# Patient Record
Sex: FEMALE | Race: WHITE | NOT HISPANIC OR LATINO | Employment: OTHER | URBAN - METROPOLITAN AREA
[De-identification: names, ages, dates, MRNs, and addresses within clinical notes are randomized per-mention and may not be internally consistent; named-entity substitution may affect disease eponyms.]

---

## 2017-02-27 RX ORDER — ERGOCALCIFEROL 1.25 MG/1
CAPSULE ORAL
Qty: 13 CAPSULE | Refills: 3 | Status: SHIPPED | OUTPATIENT
Start: 2017-02-27 | End: 2018-02-19 | Stop reason: SDUPTHER

## 2017-11-01 ENCOUNTER — OFFICE VISIT (OUTPATIENT)
Dept: INTERNAL MEDICINE | Facility: CLINIC | Age: 62
End: 2017-11-01

## 2017-11-01 DIAGNOSIS — Z00.00 ANNUAL PHYSICAL EXAM: Primary | ICD-10-CM

## 2017-11-01 DIAGNOSIS — R73.02 IGT (IMPAIRED GLUCOSE TOLERANCE): ICD-10-CM

## 2017-11-01 LAB
ALBUMIN SERPL-MCNC: 4.5 G/DL (ref 3.5–5.2)
ALBUMIN/GLOB SERPL: 1.6 G/DL
ALP SERPL-CCNC: 100 U/L (ref 39–117)
ALT SERPL-CCNC: 41 U/L (ref 1–33)
AST SERPL-CCNC: 32 U/L (ref 1–32)
BASOPHILS # BLD AUTO: 0.03 10*3/MM3 (ref 0–0.2)
BASOPHILS NFR BLD AUTO: 0.4 % (ref 0–1.5)
BILIRUB SERPL-MCNC: 0.4 MG/DL (ref 0.1–1.2)
BUN SERPL-MCNC: 16 MG/DL (ref 8–23)
BUN/CREAT SERPL: 13.7 (ref 7–25)
CALCIUM SERPL-MCNC: 9.3 MG/DL (ref 8.6–10.5)
CHLORIDE SERPL-SCNC: 102 MMOL/L (ref 98–107)
CHOLEST SERPL-MCNC: 229 MG/DL (ref 0–200)
CO2 SERPL-SCNC: 25.7 MMOL/L (ref 22–29)
CREAT SERPL-MCNC: 1.17 MG/DL (ref 0.57–1)
EOSINOPHIL # BLD AUTO: 0.16 10*3/MM3 (ref 0–0.7)
EOSINOPHIL NFR BLD AUTO: 2.4 % (ref 0.3–6.2)
ERYTHROCYTE [DISTWIDTH] IN BLOOD BY AUTOMATED COUNT: 15.6 % (ref 11.7–13)
GFR SERPLBLD CREATININE-BSD FMLA CKD-EPI: 47 ML/MIN/1.73
GFR SERPLBLD CREATININE-BSD FMLA CKD-EPI: 57 ML/MIN/1.73
GLOBULIN SER CALC-MCNC: 2.9 GM/DL
GLUCOSE SERPL-MCNC: 99 MG/DL (ref 65–99)
HBA1C MFR BLD: 5.8 % (ref 4.8–5.6)
HCT VFR BLD AUTO: 44.8 % (ref 35.6–45.5)
HDLC SERPL-MCNC: 52 MG/DL (ref 40–60)
HGB BLD-MCNC: 13.9 G/DL (ref 11.9–15.5)
IMM GRANULOCYTES # BLD: 0.03 10*3/MM3 (ref 0–0.03)
IMM GRANULOCYTES NFR BLD: 0.4 % (ref 0–0.5)
LDLC SERPL CALC-MCNC: 152 MG/DL (ref 0–100)
LDLC/HDLC SERPL: 2.93 {RATIO}
LYMPHOCYTES # BLD AUTO: 2.71 10*3/MM3 (ref 0.9–4.8)
LYMPHOCYTES NFR BLD AUTO: 40 % (ref 19.6–45.3)
MCH RBC QN AUTO: 28.2 PG (ref 26.9–32)
MCHC RBC AUTO-ENTMCNC: 31 G/DL (ref 32.4–36.3)
MCV RBC AUTO: 90.9 FL (ref 80.5–98.2)
MONOCYTES # BLD AUTO: 0.46 10*3/MM3 (ref 0.2–1.2)
MONOCYTES NFR BLD AUTO: 6.8 % (ref 5–12)
NEUTROPHILS # BLD AUTO: 3.39 10*3/MM3 (ref 1.9–8.1)
NEUTROPHILS NFR BLD AUTO: 50 % (ref 42.7–76)
PLATELET # BLD AUTO: 199 10*3/MM3 (ref 140–500)
POTASSIUM SERPL-SCNC: 4.5 MMOL/L (ref 3.5–5.2)
PROT SERPL-MCNC: 7.4 G/DL (ref 6–8.5)
RBC # BLD AUTO: 4.93 10*6/MM3 (ref 3.9–5.2)
SODIUM SERPL-SCNC: 143 MMOL/L (ref 136–145)
TRIGL SERPL-MCNC: 123 MG/DL (ref 0–150)
VLDLC SERPL CALC-MCNC: 24.6 MG/DL (ref 5–40)
WBC # BLD AUTO: 6.78 10*3/MM3 (ref 4.5–10.7)

## 2017-11-07 ENCOUNTER — OFFICE VISIT (OUTPATIENT)
Dept: INTERNAL MEDICINE | Facility: CLINIC | Age: 62
End: 2017-11-07

## 2017-11-07 VITALS
WEIGHT: 186 LBS | BODY MASS INDEX: 30.99 KG/M2 | SYSTOLIC BLOOD PRESSURE: 136 MMHG | TEMPERATURE: 98 F | HEIGHT: 65 IN | HEART RATE: 93 BPM | DIASTOLIC BLOOD PRESSURE: 72 MMHG | OXYGEN SATURATION: 95 % | RESPIRATION RATE: 16 BRPM

## 2017-11-07 DIAGNOSIS — Z12.39 SCREENING FOR BREAST CANCER: ICD-10-CM

## 2017-11-07 DIAGNOSIS — E55.9 VITAMIN D DEFICIENCY: ICD-10-CM

## 2017-11-07 DIAGNOSIS — Z00.00 ENCOUNTER FOR ANNUAL HEALTH EXAMINATION: Primary | ICD-10-CM

## 2017-11-07 DIAGNOSIS — R93.7 ABNORMALITY ON BONE DENSITOMETRY: ICD-10-CM

## 2017-11-07 DIAGNOSIS — R73.02 IGT (IMPAIRED GLUCOSE TOLERANCE): ICD-10-CM

## 2017-11-07 DIAGNOSIS — I35.8 AORTIC SYSTOLIC MURMUR ON EXAMINATION: ICD-10-CM

## 2017-11-07 LAB
BILIRUB BLD-MCNC: NEGATIVE MG/DL
CLARITY, POC: CLEAR
COLOR UR: YELLOW
GLUCOSE UR STRIP-MCNC: NEGATIVE MG/DL
KETONES UR QL: NEGATIVE
LEUKOCYTE EST, POC: NEGATIVE
NITRITE UR-MCNC: NEGATIVE MG/ML
PH UR: 6.5 [PH] (ref 5–8)
PROT UR STRIP-MCNC: NEGATIVE MG/DL
RBC # UR STRIP: NEGATIVE /UL
SP GR UR: 1.01 (ref 1–1.03)
UROBILINOGEN UR QL: NORMAL

## 2017-11-07 PROCEDURE — 99396 PREV VISIT EST AGE 40-64: CPT | Performed by: INTERNAL MEDICINE

## 2017-11-07 PROCEDURE — 93000 ELECTROCARDIOGRAM COMPLETE: CPT | Performed by: INTERNAL MEDICINE

## 2017-11-07 PROCEDURE — 81003 URINALYSIS AUTO W/O SCOPE: CPT | Performed by: INTERNAL MEDICINE

## 2017-11-07 NOTE — PROGRESS NOTES
Subjective   Chastity Berrios is a 61 y.o. female.     Chief Complaint   Patient presents with   • Annual Exam         HPI Comments: In for annual preventative exam.  She gets 7-8 hours of sleep at night.  Sleeps well.  Exercises 3-4 days per week.  Diet is balanced.  Energy is fairly good.       The following portions of the patient's history were reviewed and updated as appropriate: allergies, current medications, past social history and problem list.    HISTORY  Outpatient Prescriptions Marked as Taking for the 11/7/17 encounter (Office Visit) with Alvaro Small MD   Medication Sig Dispense Refill   • vitamin D (ERGOCALCIFEROL) 72871 UNITS capsule capsule TAKE ONE CAPSULE BY MOUTH ONCE WEEKLY 13 capsule 3     Social History     Social History   • Marital status:      Spouse name: N/A   • Number of children: N/A   • Years of education: N/A     Occupational History   • Not on file.     Social History Main Topics   • Smoking status: Never Smoker   • Smokeless tobacco: Never Used   • Alcohol use Yes      Comment: Very Occas: Wine every 2 or 3 months   • Drug use: Not on file   • Sexual activity: Not on file     Other Topics Concern   • Not on file     Social History Narrative     No family history on file.  No past medical history on file.  No past surgical history on file.    Review of Systems   Constitutional: Negative for appetite change, chills, fatigue and fever.   HENT: Negative for congestion, ear pain, hearing loss, nosebleeds, postnasal drip, rhinorrhea, sinus pressure and trouble swallowing.    Eyes: Negative for pain, itching and visual disturbance.   Respiratory: Positive for wheezing. Negative for cough, chest tightness and shortness of breath.    Cardiovascular: Positive for chest pain. Negative for palpitations and leg swelling.   Gastrointestinal: Negative for abdominal pain, anal bleeding, constipation, diarrhea, nausea, rectal pain and vomiting.   Endocrine: Negative for cold intolerance, heat  intolerance and polyuria.   Genitourinary: Negative for difficulty urinating, dysuria, flank pain, frequency, hematuria and urgency.   Musculoskeletal: Positive for arthralgias (both thumbs are getting stiff). Negative for back pain and myalgias.   Skin: Negative for rash.   Allergic/Immunologic: Positive for environmental allergies.   Neurological: Negative for dizziness, syncope, speech difficulty, weakness, light-headedness, numbness and headaches.   Hematological: Does not bruise/bleed easily.   Psychiatric/Behavioral: Negative for agitation, confusion and sleep disturbance. The patient is not nervous/anxious.        Objective   Vitals:    11/07/17 1501   BP: 136/72   Pulse: 93   Resp: 16   Temp: 98 °F (36.7 °C)   SpO2: 95%      Last Weight    11/07/17  1501   Weight: 186 lb (84.4 kg)    [unfilled]  Body mass index is 30.95 kg/(m^2).      Physical Exam   Constitutional: She is oriented to person, place, and time. She appears well-developed and well-nourished.   HENT:   Head: Normocephalic and atraumatic.   Right Ear: External ear normal.   Left Ear: External ear normal.   Nose: Nose normal.   Mouth/Throat: Oropharynx is clear and moist.   Eyes: Conjunctivae and EOM are normal. Pupils are equal, round, and reactive to light.   Neck: Normal range of motion. Neck supple. No JVD present. No thyromegaly present.   Cardiovascular: Normal rate, regular rhythm and intact distal pulses.  Exam reveals no gallop.    Murmur heard.   Crescendo decrescendo systolic murmur is present with a grade of 2/6   Grade 2-3 FLOR aortic area that radiates slightly into the left carotid artery   Pulmonary/Chest: Effort normal and breath sounds normal. No respiratory distress. She has no wheezes. She has no rales.   Abdominal: Soft. Bowel sounds are normal. She exhibits no distension and no mass. There is no tenderness. There is no guarding. No hernia.   Musculoskeletal: Normal range of motion. She exhibits no edema.   Lymphadenopathy:      She has no cervical adenopathy.   Neurological: She is alert and oriented to person, place, and time. She displays normal reflexes. No cranial nerve deficit. Coordination normal.   Skin: Skin is warm and dry.   Psychiatric: She has a normal mood and affect. Her behavior is normal. Judgment and thought content normal.   Nursing note and vitals reviewed.        Problem List Items Addressed This Visit     None      Visit Diagnoses     Encounter for annual health examination    -  Primary    Relevant Orders    POCT urinalysis dipstick, automated (Completed)        Assessment/Plan   In for annual preventative exam.  She has a history of migraines that have resolved years.  She has a history of PAT that resolved after an ablation in February 2015.  She has mild AI.  IGT.  Osteopenia.  Paget's disease of the bone.  She's had this anterior chest pain with walking uphill he eats into the neck and jaw area and is associated with shortness of breath and occasionally diaphoresis.  She has to stop and rest.  Her  has to stop and wait.  She did have cardiac catheterization prior to the ablation which was normal.  The symptoms have improved in the past year.  They also were present prior to her cardiac catheterization which was normal.   It's also of interest that she has a grade 2-3 FLOR at the aortic area.  Notes suggest trivial AI in the past.  It could be this is a new murmur.  She tells me later that the exercise symptoms began prior to the ablation and that's what led to the cardiac catheterization 2 years ago.  CBC, CMP, lipids, A1c, vit D level and EKG today.  Schedule a DEXA scan.    I reviewed her old records and found mention of a similar cardiac murmur 2012.  The rest of the cardiac evaluations showed no evidence of murmur.  Hard to know if this was an oversight.  Dr. Gillespie's examination 2 years ago also was reportedly normal.  No murmur heard.  Her cardiac catheterization was completely normal.  An echo  was performed but I could not find the report.  Some aortic sclerosis only.  A treadmill was never performed since her symptoms were so good for angina.  Will repeat a TTE.      ECG 12 Lead  Date/Time: 11/7/2017 4:54 PM  Performed by: JASPREET RAMIREZ  Authorized by: JASPREET RAMIREZ   Comparison: not compared with previous ECG   Previous ECG: no previous ECG available  Rhythm: sinus rhythm  Rate: normal  Conduction: conduction normal  ST Segments: ST segments normal  T Waves: T waves normal  QRS axis: normal  Other: no other findings  Clinical impression: normal ECG                       Dragon disclaimer:   Much of this encounter note is an electronic transcription/translation of spoken language to printed text. The electronic translation of spoken language may permit erroneous, or at times, nonsensical words or phrases to be inadvertently transcribed; Although I have reviewed the note for such errors, some may still exist.

## 2017-11-14 ENCOUNTER — TELEPHONE (OUTPATIENT)
Dept: INTERNAL MEDICINE | Facility: CLINIC | Age: 62
End: 2017-11-14

## 2017-11-27 ENCOUNTER — HOSPITAL ENCOUNTER (OUTPATIENT)
Dept: MAMMOGRAPHY | Facility: HOSPITAL | Age: 62
Discharge: HOME | End: 2017-11-27
Attending: INTERNAL MEDICINE | Admitting: INTERNAL MEDICINE

## 2017-11-27 DIAGNOSIS — Z12.39 SCREENING FOR BREAST CANCER: ICD-10-CM

## 2017-11-27 PROCEDURE — G0202 SCR MAMMO BI INCL CAD: HCPCS

## 2017-11-28 ENCOUNTER — HOSPITAL ENCOUNTER (OUTPATIENT)
Dept: CARDIOLOGY | Facility: HOSPITAL | Age: 62
Setting detail: HOSPITAL OUTPATIENT SURGERY
Discharge: HOME | End: 2017-11-28
Attending: INTERNAL MEDICINE | Admitting: INTERNAL MEDICINE

## 2017-11-28 ENCOUNTER — HOSPITAL ENCOUNTER (OUTPATIENT)
Dept: BONE DENSITY | Facility: HOSPITAL | Age: 62
Discharge: HOME | End: 2017-11-28
Attending: INTERNAL MEDICINE

## 2017-11-28 VITALS
WEIGHT: 186 LBS | SYSTOLIC BLOOD PRESSURE: 147 MMHG | DIASTOLIC BLOOD PRESSURE: 67 MMHG | HEIGHT: 65 IN | BODY MASS INDEX: 30.99 KG/M2 | HEART RATE: 80 BPM

## 2017-11-28 DIAGNOSIS — I35.0 NONRHEUMATIC AORTIC VALVE STENOSIS: Primary | ICD-10-CM

## 2017-11-28 DIAGNOSIS — R93.7 ABNORMALITY ON BONE DENSITOMETRY: ICD-10-CM

## 2017-11-28 DIAGNOSIS — I35.8 AORTIC SYSTOLIC MURMUR ON EXAMINATION: ICD-10-CM

## 2017-11-28 LAB
AORTIC DIMENSIONLESS INDEX: 0.2 (DI)
BH CV ECHO MEAS - ACS: 1.5 CM
BH CV ECHO MEAS - AI DEC SLOPE: 224 CM/SEC^2
BH CV ECHO MEAS - AI MAX PG: 38.7 MMHG
BH CV ECHO MEAS - AI MAX VEL: 311 CM/SEC
BH CV ECHO MEAS - AI P1/2T: 406.7 MSEC
BH CV ECHO MEAS - AO MAX PG (FULL): 45.8 MMHG
BH CV ECHO MEAS - AO MAX PG: 56 MMHG
BH CV ECHO MEAS - AO MEAN PG (FULL): 25 MMHG
BH CV ECHO MEAS - AO MEAN PG: 32 MMHG
BH CV ECHO MEAS - AO ROOT AREA (BSA CORRECTED): 1.8
BH CV ECHO MEAS - AO ROOT AREA: 9.1 CM^2
BH CV ECHO MEAS - AO ROOT DIAM: 3.4 CM
BH CV ECHO MEAS - AO V2 MAX: 373 CM/SEC
BH CV ECHO MEAS - AO V2 MEAN: 242 CM/SEC
BH CV ECHO MEAS - AO V2 VTI: 91 CM
BH CV ECHO MEAS - ASC AORTA: 3.4 CM
BH CV ECHO MEAS - AVA(I,A): 0.63 CM^2
BH CV ECHO MEAS - AVA(I,D): 0.5 CM^2
BH CV ECHO MEAS - AVA(V,A): 0.6 CM^2
BH CV ECHO MEAS - AVA(V,D): 0.6 CM^2
BH CV ECHO MEAS - BSA(HAYCOCK): 2 M^2
BH CV ECHO MEAS - BSA: 1.9 M^2
BH CV ECHO MEAS - BZI_BMI: 31 KILOGRAMS/M^2
BH CV ECHO MEAS - BZI_METRIC_HEIGHT: 165.1 CM
BH CV ECHO MEAS - BZI_METRIC_WEIGHT: 84.4 KG
BH CV ECHO MEAS - CONTRAST EF (2CH): 71.2 ML/M^2
BH CV ECHO MEAS - CONTRAST EF 4CH: 68.8 ML/M^2
BH CV ECHO MEAS - EDV(CUBED): 68.9 ML
BH CV ECHO MEAS - EDV(MOD-SP2): 66 ML
BH CV ECHO MEAS - EDV(MOD-SP4): 77 ML
BH CV ECHO MEAS - EDV(TEICH): 74.2 ML
BH CV ECHO MEAS - EF(CUBED): 68.1 %
BH CV ECHO MEAS - EF(MOD-SP2): 71.2 %
BH CV ECHO MEAS - EF(MOD-SP4): 68.8 %
BH CV ECHO MEAS - EF(TEICH): 60.2 %
BH CV ECHO MEAS - ESV(CUBED): 22 ML
BH CV ECHO MEAS - ESV(MOD-SP2): 19 ML
BH CV ECHO MEAS - ESV(MOD-SP4): 24 ML
BH CV ECHO MEAS - ESV(TEICH): 29.6 ML
BH CV ECHO MEAS - FS: 31.7 %
BH CV ECHO MEAS - IVS/LVPW: 1.1
BH CV ECHO MEAS - IVSD: 1 CM
BH CV ECHO MEAS - LAT PEAK E' VEL: 8 CM/SEC
BH CV ECHO MEAS - LV DIASTOLIC VOL/BSA (35-75): 40.1 ML/M^2
BH CV ECHO MEAS - LV MASS(C)D: 123 GRAMS
BH CV ECHO MEAS - LV MASS(C)DI: 64.1 GRAMS/M^2
BH CV ECHO MEAS - LV MAX PG: 2.7 MMHG
BH CV ECHO MEAS - LV MEAN PG: 2 MMHG
BH CV ECHO MEAS - LV SYSTOLIC VOL/BSA (12-30): 12.5 ML/M^2
BH CV ECHO MEAS - LV V1 MAX: 82 CM/SEC
BH CV ECHO MEAS - LV V1 MEAN: 58.4 CM/SEC
BH CV ECHO MEAS - LV V1 VTI: 21.5 CM
BH CV ECHO MEAS - LVIDD: 4.1 CM
BH CV ECHO MEAS - LVIDS: 2.8 CM
BH CV ECHO MEAS - LVLD AP2: 7.8 CM
BH CV ECHO MEAS - LVLD AP4: 7.7 CM
BH CV ECHO MEAS - LVLS AP2: 5.9 CM
BH CV ECHO MEAS - LVLS AP4: 6.2 CM
BH CV ECHO MEAS - LVOT AREA (M): 2.5 CM^2
BH CV ECHO MEAS - LVOT AREA: 2.5 CM^2
BH CV ECHO MEAS - LVOT DIAM: 1.8 CM
BH CV ECHO MEAS - LVPWD: 0.9 CM
BH CV ECHO MEAS - MED PEAK E' VEL: 7 CM/SEC
BH CV ECHO MEAS - MR MAX PG: 80.3 MMHG
BH CV ECHO MEAS - MR MAX VEL: 448 CM/SEC
BH CV ECHO MEAS - MV A DUR: 0.18 SEC
BH CV ECHO MEAS - MV A MAX VEL: 84.9 CM/SEC
BH CV ECHO MEAS - MV DEC SLOPE: 596 CM/SEC^2
BH CV ECHO MEAS - MV DEC TIME: 0.22 SEC
BH CV ECHO MEAS - MV E MAX VEL: 87.3 CM/SEC
BH CV ECHO MEAS - MV E/A: 1
BH CV ECHO MEAS - MV MAX PG: 4 MMHG
BH CV ECHO MEAS - MV MEAN PG: 2 MMHG
BH CV ECHO MEAS - MV P1/2T MAX VEL: 99.1 CM/SEC
BH CV ECHO MEAS - MV P1/2T: 48.7 MSEC
BH CV ECHO MEAS - MV V2 MAX: 99.7 CM/SEC
BH CV ECHO MEAS - MV V2 MEAN: 56.2 CM/SEC
BH CV ECHO MEAS - MV V2 VTI: 24 CM
BH CV ECHO MEAS - MVA P1/2T LCG: 2.2 CM^2
BH CV ECHO MEAS - MVA(P1/2T): 4.5 CM^2
BH CV ECHO MEAS - MVA(VTI): 2.3 CM^2
BH CV ECHO MEAS - PA ACC TIME: 0.12 SEC
BH CV ECHO MEAS - PA MAX PG (FULL): 2.3 MMHG
BH CV ECHO MEAS - PA MAX PG: 3.9 MMHG
BH CV ECHO MEAS - PA PR(ACCEL): 23.7 MMHG
BH CV ECHO MEAS - PA V2 MAX: 99.3 CM/SEC
BH CV ECHO MEAS - PULM A REVS DUR: 0.15 SEC
BH CV ECHO MEAS - PULM A REVS VEL: 39.3 CM/SEC
BH CV ECHO MEAS - PULM DIAS VEL: 51.4 CM/SEC
BH CV ECHO MEAS - PULM S/D: 1.3
BH CV ECHO MEAS - PULM SYS VEL: 66.5 CM/SEC
BH CV ECHO MEAS - PVA(V,A): 2.7 CM^2
BH CV ECHO MEAS - PVA(V,D): 2.7 CM^2
BH CV ECHO MEAS - QP/QS: 0.95
BH CV ECHO MEAS - RAP SYSTOLE: 3 MMHG
BH CV ECHO MEAS - RV MAX PG: 1.6 MMHG
BH CV ECHO MEAS - RV MEAN PG: 1 MMHG
BH CV ECHO MEAS - RV V1 MAX: 63.5 CM/SEC
BH CV ECHO MEAS - RV V1 MEAN: 41.7 CM/SEC
BH CV ECHO MEAS - RV V1 VTI: 12.5 CM
BH CV ECHO MEAS - RVOT AREA: 4.2 CM^2
BH CV ECHO MEAS - RVOT DIAM: 2.3 CM
BH CV ECHO MEAS - RVSP: 22.5 MMHG
BH CV ECHO MEAS - SI(AO): 410.4 ML/M^2
BH CV ECHO MEAS - SI(CUBED): 24.5 ML/M^2
BH CV ECHO MEAS - SI(LVOT): 28.5 ML/M^2
BH CV ECHO MEAS - SI(MOD-SP2): 24.5 ML/M^2
BH CV ECHO MEAS - SI(MOD-SP4): 27.6 ML/M^2
BH CV ECHO MEAS - SI(TEICH): 23.3 ML/M^2
BH CV ECHO MEAS - SV(AO): 787.2 ML
BH CV ECHO MEAS - SV(CUBED): 47 ML
BH CV ECHO MEAS - SV(LVOT): 54.7 ML
BH CV ECHO MEAS - SV(MOD-SP2): 47 ML
BH CV ECHO MEAS - SV(MOD-SP4): 53 ML
BH CV ECHO MEAS - SV(RVOT): 51.9 ML
BH CV ECHO MEAS - SV(TEICH): 44.7 ML
BH CV ECHO MEAS - TAPSE (>1.6): 2 CM2
BH CV ECHO MEAS - TR MAX VEL: 221 CM/SEC
BH CV VAS BP RIGHT ARM: NORMAL MMHG
BH CV XLRA - RV BASE: 2.8 CM
BH CV XLRA - TDI S': 14 CM/SEC
E/E' RATIO: 12
LEFT ATRIUM VOLUME INDEX: 22 ML/M2
LEFT ATRIUM VOLUME: 38 CM3
MAXIMAL PREDICTED HEART RATE: 156 BPM
STRESS TARGET HR: 133 BPM

## 2017-11-28 PROCEDURE — 93306 TTE W/DOPPLER COMPLETE: CPT

## 2017-11-28 PROCEDURE — 93306 TTE W/DOPPLER COMPLETE: CPT | Performed by: INTERNAL MEDICINE

## 2017-11-28 PROCEDURE — 77080 DXA BONE DENSITY AXIAL: CPT

## 2017-11-28 PROCEDURE — 0399T HC MYOCARDL STRAIN IMAG QUAN ASSMT PER SESS: CPT

## 2017-11-28 PROCEDURE — 0399T ADULT TRANSTHORACIC ECHO COMPLETE W/ CONT IF NECESSARY PER PROTOCOL: CPT | Performed by: INTERNAL MEDICINE

## 2017-12-04 ENCOUNTER — TELEPHONE (OUTPATIENT)
Dept: CARDIOLOGY | Facility: CLINIC | Age: 62
End: 2017-12-04

## 2018-01-09 ENCOUNTER — OFFICE VISIT (OUTPATIENT)
Dept: CARDIOLOGY | Facility: CLINIC | Age: 63
End: 2018-01-09

## 2018-01-09 VITALS
HEIGHT: 65 IN | DIASTOLIC BLOOD PRESSURE: 88 MMHG | HEART RATE: 79 BPM | WEIGHT: 185 LBS | BODY MASS INDEX: 30.82 KG/M2 | SYSTOLIC BLOOD PRESSURE: 150 MMHG

## 2018-01-09 DIAGNOSIS — I35.0 NONRHEUMATIC AORTIC VALVE STENOSIS: Primary | ICD-10-CM

## 2018-01-09 PROCEDURE — 93000 ELECTROCARDIOGRAM COMPLETE: CPT | Performed by: INTERNAL MEDICINE

## 2018-01-09 PROCEDURE — 99204 OFFICE O/P NEW MOD 45 MIN: CPT | Performed by: INTERNAL MEDICINE

## 2018-01-09 RX ORDER — CALCIPOTRIENE AND BETAMETHASONE DIPROPIONATE 50; .5 UG/G; MG/G
SUSPENSION TOPICAL DAILY
COMMUNITY
End: 2020-11-13

## 2018-01-12 NOTE — PROGRESS NOTES
Subjective:     Encounter Date:01/09/2018      Patient ID: Chastity Berrios is a 62 y.o. female.    Chief Complaint:Abnormal echocardiogram.  History of Present Illness    62-year-old female who was referred by Dr. Alvaro SCHNEIDER to my office for evaluation of an abnormal transthoracic echocardiogram.  Patient presented complaining of chest discomfort with exertion as well as some shortness of breath with exertion.  Patient has a history of an AVNRT and was admitted by Dr. Colindres several years ago.  She also had a cardiac catheterization at that time that showed no significant coronary artery disease.  Patient has continued to deteriorate.  Her stamina has worsened she has started to significantly slow down.  She also complains of a dry cough.  Echocardiogram was done that showed aortic stenosis and she was referred for further evaluation  Review of Systems   Respiratory: Positive for shortness of breath and snoring.    Musculoskeletal: Positive for joint pain.   All other systems reviewed and are negative.        ECG 12 Lead  Date/Time: 1/9/2018 2:48 PM  Performed by: KADE HARO  Authorized by: KADE HARO   Comparison: compared with previous ECG from 2/14/2014  Similar to previous ECG  Rhythm: sinus rhythm  Clinical impression: normal ECG               Objective:     Physical Exam   Constitutional: She is oriented to person, place, and time. She appears well-developed.   HENT:   Head: Normocephalic.   Eyes: Conjunctivae are normal.   Neck: Normal range of motion.   Cardiovascular: Normal rate, regular rhythm and normal heart sounds.    Pulmonary/Chest: Breath sounds normal.   Abdominal: Soft. Bowel sounds are normal.   Musculoskeletal: Normal range of motion. She exhibits no edema.   Neurological: She is alert and oriented to person, place, and time.   Skin: Skin is warm and dry.   Psychiatric: She has a normal mood and affect. Her behavior is normal.   Vitals reviewed.      Lab Review:        Assessment:          Diagnosis Plan   1. Nonrheumatic aortic valve stenosis  Adult Transesophageal Echo (EMANI) W/ Cont if Necessary Per Protocol          Plan:       1. Patient clearly is significantly more symptomatic over the past several months.  She said it really started 2015 continue to worsen and accelerated recently.  I reviewed the echocardiogram with both her and her .  There was a caveat there that could not exclude aseptic aortic obstruction.  Looking at the valve that was pretty significant movement considering her symptoms.  In light of that I think the first appropriate approach is a transesophageal echocardiogram to make sure that this is either the valve or or possibly something subvalvular.

## 2018-01-15 ENCOUNTER — LAB (OUTPATIENT)
Dept: LAB | Facility: HOSPITAL | Age: 63
End: 2018-01-15
Attending: INTERNAL MEDICINE

## 2018-01-15 ENCOUNTER — TRANSCRIBE ORDERS (OUTPATIENT)
Dept: ADMINISTRATIVE | Facility: HOSPITAL | Age: 63
End: 2018-01-15

## 2018-01-15 DIAGNOSIS — Z13.6 SCREENING FOR ISCHEMIC HEART DISEASE: ICD-10-CM

## 2018-01-15 DIAGNOSIS — Z01.810 PRE-OPERATIVE CARDIOVASCULAR EXAMINATION: Primary | ICD-10-CM

## 2018-01-15 DIAGNOSIS — Z01.810 PRE-OPERATIVE CARDIOVASCULAR EXAMINATION: ICD-10-CM

## 2018-01-15 LAB
ANION GAP SERPL CALCULATED.3IONS-SCNC: 12.1 MMOL/L
BASOPHILS # BLD AUTO: 0.04 10*3/MM3 (ref 0–0.2)
BASOPHILS NFR BLD AUTO: 0.6 % (ref 0–1.5)
BUN BLD-MCNC: 16 MG/DL (ref 8–23)
BUN/CREAT SERPL: 12.9 (ref 7–25)
CALCIUM SPEC-SCNC: 9.8 MG/DL (ref 8.6–10.5)
CHLORIDE SERPL-SCNC: 102 MMOL/L (ref 98–107)
CO2 SERPL-SCNC: 29.9 MMOL/L (ref 22–29)
CREAT BLD-MCNC: 1.24 MG/DL (ref 0.57–1)
DEPRECATED RDW RBC AUTO: 45.9 FL (ref 37–54)
EOSINOPHIL # BLD AUTO: 0.22 10*3/MM3 (ref 0–0.7)
EOSINOPHIL NFR BLD AUTO: 3.1 % (ref 0.3–6.2)
ERYTHROCYTE [DISTWIDTH] IN BLOOD BY AUTOMATED COUNT: 14.3 % (ref 11.7–13)
GFR SERPL CREATININE-BSD FRML MDRD: 44 ML/MIN/1.73
GLUCOSE BLD-MCNC: 125 MG/DL (ref 65–99)
HCT VFR BLD AUTO: 42.3 % (ref 35.6–45.5)
HGB BLD-MCNC: 13.9 G/DL (ref 11.9–15.5)
IMM GRANULOCYTES # BLD: 0.02 10*3/MM3 (ref 0–0.03)
IMM GRANULOCYTES NFR BLD: 0.3 % (ref 0–0.5)
LYMPHOCYTES # BLD AUTO: 2.91 10*3/MM3 (ref 0.9–4.8)
LYMPHOCYTES NFR BLD AUTO: 40.9 % (ref 19.6–45.3)
MCH RBC QN AUTO: 29 PG (ref 26.9–32)
MCHC RBC AUTO-ENTMCNC: 32.9 G/DL (ref 32.4–36.3)
MCV RBC AUTO: 88.1 FL (ref 80.5–98.2)
MONOCYTES # BLD AUTO: 0.43 10*3/MM3 (ref 0.2–1.2)
MONOCYTES NFR BLD AUTO: 6 % (ref 5–12)
NEUTROPHILS # BLD AUTO: 3.49 10*3/MM3 (ref 1.9–8.1)
NEUTROPHILS NFR BLD AUTO: 49.1 % (ref 42.7–76)
PLATELET # BLD AUTO: 183 10*3/MM3 (ref 140–500)
PMV BLD AUTO: 10.8 FL (ref 6–12)
POTASSIUM BLD-SCNC: 4.3 MMOL/L (ref 3.5–5.2)
RBC # BLD AUTO: 4.8 10*6/MM3 (ref 3.9–5.2)
SODIUM BLD-SCNC: 144 MMOL/L (ref 136–145)
WBC NRBC COR # BLD: 7.11 10*3/MM3 (ref 4.5–10.7)

## 2018-01-15 PROCEDURE — 80048 BASIC METABOLIC PNL TOTAL CA: CPT

## 2018-01-15 PROCEDURE — 36415 COLL VENOUS BLD VENIPUNCTURE: CPT

## 2018-01-15 PROCEDURE — 85025 COMPLETE CBC W/AUTO DIFF WBC: CPT

## 2018-01-18 ENCOUNTER — HOSPITAL ENCOUNTER (OUTPATIENT)
Dept: CARDIOLOGY | Facility: HOSPITAL | Age: 63
Discharge: HOME | End: 2018-01-18

## 2018-01-18 ENCOUNTER — HOSPITAL ENCOUNTER (OUTPATIENT)
Dept: CARDIOLOGY | Facility: HOSPITAL | Age: 63
Discharge: HOME | End: 2018-01-18
Attending: INTERNAL MEDICINE | Admitting: INTERNAL MEDICINE

## 2018-01-18 VITALS — DIASTOLIC BLOOD PRESSURE: 70 MMHG | SYSTOLIC BLOOD PRESSURE: 130 MMHG | OXYGEN SATURATION: 95 % | HEART RATE: 64 BPM

## 2018-01-18 VITALS
RESPIRATION RATE: 18 BRPM | HEART RATE: 65 BPM | OXYGEN SATURATION: 95 % | DIASTOLIC BLOOD PRESSURE: 66 MMHG | SYSTOLIC BLOOD PRESSURE: 123 MMHG

## 2018-01-18 DIAGNOSIS — I35.0 NONRHEUMATIC AORTIC VALVE STENOSIS: ICD-10-CM

## 2018-01-18 DIAGNOSIS — I35.0 NONRHEUMATIC AORTIC VALVE STENOSIS: Primary | ICD-10-CM

## 2018-01-18 LAB
BH CV ECHO MEAS - BSA(HAYCOCK): 2 M^2
BH CV ECHO MEAS - BSA: 1.9 M^2
BH CV ECHO MEAS - BZI_BMI: 30 KILOGRAMS/M^2
BH CV ECHO MEAS - BZI_METRIC_HEIGHT: 165.1 CM
BH CV ECHO MEAS - BZI_METRIC_WEIGHT: 81.6 KG

## 2018-01-18 PROCEDURE — 99152 MOD SED SAME PHYS/QHP 5/>YRS: CPT | Performed by: INTERNAL MEDICINE

## 2018-01-18 PROCEDURE — 25010000002 MIDAZOLAM PER 1 MG: Performed by: INTERNAL MEDICINE

## 2018-01-18 PROCEDURE — 93312 ECHO TRANSESOPHAGEAL: CPT

## 2018-01-18 PROCEDURE — 25010000002 FENTANYL CITRATE (PF) 100 MCG/2ML SOLUTION: Performed by: INTERNAL MEDICINE

## 2018-01-18 PROCEDURE — 93325 DOPPLER ECHO COLOR FLOW MAPG: CPT

## 2018-01-18 PROCEDURE — 93325 DOPPLER ECHO COLOR FLOW MAPG: CPT | Performed by: INTERNAL MEDICINE

## 2018-01-18 PROCEDURE — 94760 N-INVAS EAR/PLS OXIMETRY 1: CPT

## 2018-01-18 PROCEDURE — 93321 DOPPLER ECHO F-UP/LMTD STD: CPT | Performed by: INTERNAL MEDICINE

## 2018-01-18 PROCEDURE — 93312 ECHO TRANSESOPHAGEAL: CPT | Performed by: INTERNAL MEDICINE

## 2018-01-18 PROCEDURE — 93321 DOPPLER ECHO F-UP/LMTD STD: CPT

## 2018-01-18 PROCEDURE — 93320 DOPPLER ECHO COMPLETE: CPT

## 2018-01-18 RX ORDER — FENTANYL CITRATE 50 UG/ML
INJECTION, SOLUTION INTRAMUSCULAR; INTRAVENOUS
Status: COMPLETED | OUTPATIENT
Start: 2018-01-18 | End: 2018-01-18

## 2018-01-18 RX ORDER — MIDAZOLAM HYDROCHLORIDE 1 MG/ML
INJECTION INTRAMUSCULAR; INTRAVENOUS
Status: COMPLETED | OUTPATIENT
Start: 2018-01-18 | End: 2018-01-18

## 2018-01-18 RX ADMIN — Medication 1 MG: at 08:18

## 2018-01-18 RX ADMIN — Medication 2 MG: at 08:14

## 2018-01-18 RX ADMIN — FENTANYL CITRATE 50 MCG: 50 INJECTION INTRAMUSCULAR; INTRAVENOUS at 08:14

## 2018-01-18 NOTE — PATIENT INSTRUCTIONS
Transesophageal Echocardiogram  Transesophageal echocardiography (EMANI) is a picture test of your heart using sound waves. The pictures taken can give very detailed pictures of your heart. This can help your doctor see if there are problems with your heart. EMANI can check:  · If your heart has blood clots in it.  · How well your heart valves are working.  · If you have an infection on the inside of your heart.  · Some of the major arteries of your heart.  · If your heart valve is working after a repair.  · Your heart before a procedure that uses a shock to your heart to get the rhythm back to normal.  What happens before the procedure?  · Do not eat or drink for 6 hours before the procedure or as told by your doctor.  · Make plans to have someone drive you home after the procedure. Do not drive yourself home.  · An IV tube will be put in your arm.  What happens during the procedure?  · You will be given a medicine to help you relax (sedative). It will be given through the IV tube.  · A numbing medicine will be sprayed or gargled in the back of your throat to help numb it.  · The tip of the probe is placed into the back of your mouth. You will be asked to swallow. This helps to pass the probe into your esophagus.  · Once the tip of the probe is in the right place, your doctor can take pictures of your heart.  · You may feel pressure at the back of your throat.  What happens after the procedure?  · You will be taken to a recovery area so the sedative can wear off.  · Your throat may be sore and scratchy. This will go away slowly over time.  · You will go home when you are fully awake and able to swallow liquids.  · You should have someone stay with you for the next 24 hours.  · Do not drive or operate machinery for the next 24 hours.  This information is not intended to replace advice given to you by your health care provider. Make sure you discuss any questions you have with your health care provider.  Document  Released: 10/15/2010 Document Revised: 05/25/2017 Document Reviewed: 06/19/2014  [employer] Interactive Patient Education © 2017 [employer]    Moderate Conscious Sedation, Adult, Care After  These instructions provide you with information about caring for yourself after your procedure. Your health care provider may also give you more specific instructions. Your treatment has been planned according to current medical practices, but problems sometimes occur. Call your health care provider if you have any problems or questions after your procedure.  What can I expect after the procedure?  After your procedure, it is common:  · To feel sleepy for several hours.  · To feel clumsy and have poor balance for several hours.  · To have poor judgment for several hours.  · To vomit if you eat too soon.  Follow these instructions at home:  For at least 24 hours after the procedure:   · Do not:  ¨ Participate in activities where you could fall or become injured.  ¨ Drive.  ¨ Use heavy machinery.  ¨ Drink alcohol.  ¨ Take sleeping pills or medicines that cause drowsiness.  ¨ Make important decisions or sign legal documents.  ¨ Take care of children on your own.  · Rest.  Eating and drinking  · Follow the diet recommended by your health care provider.  · If you vomit:  ¨ Drink water, juice, or soup when you can drink without vomiting.  ¨ Make sure you have little or no nausea before eating solid foods.  General instructions  · Have a responsible adult stay with you until you are awake and alert.  · Take over-the-counter and prescription medicines only as told by your health care provider.  · If you smoke, do not smoke without supervision.  · Keep all follow-up visits as told by your health care provider. This is important.  Contact a health care provider if:  · You keep feeling nauseous or you keep vomiting.  · You feel light-headed.  · You develop a rash.  · You have a fever.  Get help right away if:  · You have trouble  breathing.  This information is not intended to replace advice given to you by your health care provider. Make sure you discuss any questions you have with your health care provider.  Document Released: 10/08/2014 Document Revised: 05/22/2017 Document Reviewed: 04/08/2017  [employer] Interactive Patient Education © 2017 [employer]

## 2018-01-19 PROBLEM — I35.0 NONRHEUMATIC AORTIC VALVE STENOSIS: Status: ACTIVE | Noted: 2018-01-19

## 2018-01-24 ENCOUNTER — HOSPITAL ENCOUNTER (OUTPATIENT)
Facility: HOSPITAL | Age: 63
Setting detail: HOSPITAL OUTPATIENT SURGERY
Discharge: HOME | End: 2018-01-24
Attending: INTERNAL MEDICINE | Admitting: INTERNAL MEDICINE

## 2018-01-24 VITALS
HEIGHT: 65 IN | RESPIRATION RATE: 16 BRPM | SYSTOLIC BLOOD PRESSURE: 130 MMHG | OXYGEN SATURATION: 94 % | WEIGHT: 179.31 LBS | DIASTOLIC BLOOD PRESSURE: 70 MMHG | TEMPERATURE: 98.7 F | HEART RATE: 70 BPM | BODY MASS INDEX: 29.87 KG/M2

## 2018-01-24 DIAGNOSIS — I35.0 NONRHEUMATIC AORTIC VALVE STENOSIS: ICD-10-CM

## 2018-01-24 PROCEDURE — 25010000002 MIDAZOLAM PER 1 MG: Performed by: INTERNAL MEDICINE

## 2018-01-24 PROCEDURE — C1894 INTRO/SHEATH, NON-LASER: HCPCS | Performed by: INTERNAL MEDICINE

## 2018-01-24 PROCEDURE — 0 IOPAMIDOL PER 1 ML: Performed by: INTERNAL MEDICINE

## 2018-01-24 PROCEDURE — 85018 HEMOGLOBIN: CPT

## 2018-01-24 PROCEDURE — 85014 HEMATOCRIT: CPT

## 2018-01-24 PROCEDURE — 25010000002 FENTANYL CITRATE (PF) 100 MCG/2ML SOLUTION: Performed by: INTERNAL MEDICINE

## 2018-01-24 PROCEDURE — C1887 CATHETER, GUIDING: HCPCS | Performed by: INTERNAL MEDICINE

## 2018-01-24 PROCEDURE — C1769 GUIDE WIRE: HCPCS | Performed by: INTERNAL MEDICINE

## 2018-01-24 PROCEDURE — 93460 R&L HRT ART/VENTRICLE ANGIO: CPT | Performed by: INTERNAL MEDICINE

## 2018-01-24 PROCEDURE — 25010000002 HEPARIN (PORCINE) PER 1000 UNITS: Performed by: INTERNAL MEDICINE

## 2018-01-24 RX ORDER — NALOXONE HCL 0.4 MG/ML
0.4 VIAL (ML) INJECTION
Status: CANCELLED | OUTPATIENT
Start: 2018-01-24

## 2018-01-24 RX ORDER — LIDOCAINE HYDROCHLORIDE 10 MG/ML
0.1 INJECTION, SOLUTION EPIDURAL; INFILTRATION; INTRACAUDAL; PERINEURAL ONCE AS NEEDED
Status: DISCONTINUED | OUTPATIENT
Start: 2018-01-24 | End: 2018-01-24 | Stop reason: HOSPADM

## 2018-01-24 RX ORDER — ONDANSETRON 2 MG/ML
4 INJECTION INTRAMUSCULAR; INTRAVENOUS EVERY 6 HOURS PRN
Status: CANCELLED | OUTPATIENT
Start: 2018-01-24

## 2018-01-24 RX ORDER — FLUTICASONE PROPIONATE 50 MCG
2 SPRAY, SUSPENSION (ML) NASAL DAILY PRN
COMMUNITY

## 2018-01-24 RX ORDER — MIDAZOLAM HYDROCHLORIDE 1 MG/ML
INJECTION INTRAMUSCULAR; INTRAVENOUS AS NEEDED
Status: DISCONTINUED | OUTPATIENT
Start: 2018-01-24 | End: 2018-01-24 | Stop reason: HOSPADM

## 2018-01-24 RX ORDER — LIDOCAINE HYDROCHLORIDE 20 MG/ML
INJECTION, SOLUTION INFILTRATION; PERINEURAL AS NEEDED
Status: DISCONTINUED | OUTPATIENT
Start: 2018-01-24 | End: 2018-01-24 | Stop reason: HOSPADM

## 2018-01-24 RX ORDER — FENTANYL CITRATE 50 UG/ML
INJECTION, SOLUTION INTRAMUSCULAR; INTRAVENOUS AS NEEDED
Status: DISCONTINUED | OUTPATIENT
Start: 2018-01-24 | End: 2018-01-24 | Stop reason: HOSPADM

## 2018-01-24 RX ORDER — PERPHENAZINE/AMITRIPTYLINE HCL 4MG-10MG
1000 TABLET ORAL DAILY
COMMUNITY
End: 2018-03-03 | Stop reason: HOSPADM

## 2018-01-24 RX ORDER — SODIUM CHLORIDE 0.9 % (FLUSH) 0.9 %
1-10 SYRINGE (ML) INJECTION AS NEEDED
Status: DISCONTINUED | OUTPATIENT
Start: 2018-01-24 | End: 2018-01-24 | Stop reason: HOSPADM

## 2018-01-24 RX ORDER — ONDANSETRON 4 MG/1
4 TABLET, ORALLY DISINTEGRATING ORAL EVERY 6 HOURS PRN
Status: CANCELLED | OUTPATIENT
Start: 2018-01-24

## 2018-01-24 RX ORDER — SODIUM CHLORIDE 9 MG/ML
75 INJECTION, SOLUTION INTRAVENOUS CONTINUOUS
Status: DISCONTINUED | OUTPATIENT
Start: 2018-01-24 | End: 2018-01-24 | Stop reason: HOSPADM

## 2018-01-24 RX ORDER — ONDANSETRON 4 MG/1
4 TABLET, FILM COATED ORAL EVERY 6 HOURS PRN
Status: CANCELLED | OUTPATIENT
Start: 2018-01-24

## 2018-01-24 RX ORDER — HYDROCODONE BITARTRATE AND ACETAMINOPHEN 5; 325 MG/1; MG/1
1 TABLET ORAL EVERY 4 HOURS PRN
Status: CANCELLED | OUTPATIENT
Start: 2018-01-24

## 2018-01-24 RX ADMIN — SODIUM CHLORIDE 75 ML/HR: 9 INJECTION, SOLUTION INTRAVENOUS at 08:21

## 2018-01-24 NOTE — PLAN OF CARE
Problem: Patient Care Overview (Adult)  Goal: Plan of Care Review  Outcome: Outcome(s) achieved Date Met: 01/24/18 01/24/18 1116   Coping/Psychosocial Response Interventions   Plan Of Care Reviewed With patient;spouse   Patient Care Overview   Progress improving   Outcome Evaluation   Outcome Summary/Follow up Plan site C/D/I and soft. Ready for D/C     Goal: Adult Individualization and Mutuality  Outcome: Outcome(s) achieved Date Met: 01/24/18    Goal: Discharge Needs Assessment  Outcome: Outcome(s) achieved Date Met: 01/24/18      Problem: Cardiac Catheterization with/without PCI (Adult)  Goal: Signs and Symptoms of Listed Potential Problems Will be Absent or Manageable (Cardiac Catheterization with/without PCI)  Outcome: Outcome(s) achieved Date Met: 01/24/18

## 2018-01-24 NOTE — DISCHARGE INSTRUCTIONS
[facility]  [address] [place] [address]    Coronary Angiogram (Radial/Ulnar Approach) After Care    Refer to this sheet in the next few weeks. These instructions provide you with information on caring for yourself after your procedure. Your caregiver may also give you more specific instructions. Your treatment has been planned according to current medical practices, but problems sometimes occur. Call your caregiver if you have any problems or questions after your procedure.    Home Care Instructions:  · You may shower the day after the procedure. Remove the bandage (dressing) and gently wash the site with plain soap and water. Gently pat the site dry. You may apply a band aid daily for 2 days if desired.    · Do not apply powder or lotion to the site.  · Do not submerge the affected site in water for one week or until the site is completely healed.   · Do not lift, push or pull anything over 10 pounds for one week after your procedure.  · Inspect the site at least twice daily. You may notice some bruising at the site and it may be tender for 1 to 2 weeks.     · Increase your fluid intake for the next 2 days.    · Keep arm elevated for 24 hours. For the remainder of the day, keep your arm in “Pledge of Allegiance” position when up and about.     · You may drive 24 hours after the procedure unless otherwise instructed by your caregiver.  · Do not operate machinery or power tools for 24 hours.  · A responsible adult should be with you for the first 24 hours after you arrive home. Do not make any important legal decisions or sign legal papers for 24 hours.      Call Your Doctor if:   · You have unusual pain at the radial/ulnar (wrist) site.  · You have redness, warmth, swelling, or pain at the radial/ulnar (wrist) site.  · You have drainage (other than a small amount of blood on the dressing).  · You have chills or a fever > 101.  · Your arm becomes pale or dark, cool, tingly, or numb.  · You  have heavy bleeding from the site, hold pressure on the site for 20 minutes.  If the bleeding stops, apply a fresh bandage and call your cardiologist.  However, if you continue to have bleeding, call 911.          ***SEDATION DISCHARGE INSTRUCTIONS.***    IMPORTANT: The following information will help you return to your best level of health.  Sedation.  You have had a procedure that called for some medicine to reduce anxiety and pain. This medicine (or medicines) is called  sedation. After receiving the medicine, you may be sleepy, but able to breathe on your own. The effects of the medicine may last for several hours.    Follow these instructions after sedation:   Go right home. Rest quietly at home today, then you can be up and about.   Do not drink alcohol, drive or operate machinery for 24 hours.   Do not do anything where light-headedness or clumsiness would be dangerous.   Do not make important decisions or sign any legal papers for the next 24 hours.   Make sure A RESPONSIBLE PERSON stays with you the rest of today and overnight for your protection and safety.   Start your diet with fluids and light foods (jello, soup, juice, toast). Then, slowly progress to your usual diet if you are not sick to your stomach.    Call your doctor if you have:   a gray or blue skin color.   excess sleepiness.   repeated vomiting.   trouble breathing.   any new problems or concerns.

## 2018-01-25 LAB
HCT VFR BLDA CALC: 37 % (ref 38–51)
HGB BLDA-MCNC: 12.6 G/DL (ref 12–17)
SAO2 % BLDA: 65 % (ref 95–98)
SAO2 % BLDA: 70 % (ref 95–98)
SAO2 % BLDA: 95 % (ref 95–98)

## 2018-02-01 ENCOUNTER — OFFICE VISIT (OUTPATIENT)
Dept: CARDIAC SURGERY | Facility: CLINIC | Age: 63
End: 2018-02-01

## 2018-02-01 VITALS
OXYGEN SATURATION: 98 % | WEIGHT: 180 LBS | RESPIRATION RATE: 20 BRPM | SYSTOLIC BLOOD PRESSURE: 147 MMHG | HEART RATE: 87 BPM | BODY MASS INDEX: 29.99 KG/M2 | HEIGHT: 65 IN | TEMPERATURE: 98.4 F | DIASTOLIC BLOOD PRESSURE: 82 MMHG

## 2018-02-01 DIAGNOSIS — I35.0 NONRHEUMATIC AORTIC VALVE STENOSIS: Primary | ICD-10-CM

## 2018-02-01 PROCEDURE — 99205 OFFICE O/P NEW HI 60 MIN: CPT | Performed by: THORACIC SURGERY (CARDIOTHORACIC VASCULAR SURGERY)

## 2018-02-01 NOTE — PROGRESS NOTES
2/1/2018      Subjective:      Alvaro Small MD    Chief Complaint: Shortness of air and cough.  Severe aortic stenosis.    History of Present Illness:       Dear Dr. Alvaro Small MD and Colleagues,  It was nice to see Chastity Berrios in consultation at your request. She is a 62 y.o. female with a history of aortic stenosis who has developed class III symptoms of shortness of air chest discomfort and presyncope and a chronic cough since the fall. She denies congestive heart failure. The ECHO that I reviewed personally shows critical aortic stenosis with a 0.5 cm valve area.  The mean gradient is 32 mmHg.  The peak gradient is 56 mmHg.  The velocity is 373 cm/s.  There were some concern about (TriCor alpha tract obstruction so a transesophageal echo was performed.  This shows no left ventricular outflow tract obstruction.. The Cardiac Cath that I reviewed personally shows normal coronary arteries.  The gradient is 33 mmHg mean.  The PA pressures 24/13.  The valve areas 0.6 and a meter squared.    She is therefore referred for aortic valve surgery.  She had an ablation by Dr. Colindres in the past and is been in regular rhythm since then.    Patient Active Problem List   Diagnosis   • Psoriasis   • Paget's bone disease   • Perennial allergic rhinitis   • PAT (paroxysmal atrial tachycardia)   • Migraine without aura and without status migrainosus, not intractable   • IGT (impaired glucose tolerance)   • Osteopenia   • Vitamin D deficiency   • Nonrheumatic aortic valve stenosis       Past Medical History:   Diagnosis Date   • Angina pectoris    • Aortic regurgitation     trace to mild   • Aortic systolic murmur on examination    • Arrhythmia    • Arthralgia     both thumbs are getting stiff   • Chest pain    • Coronary artery disease    • Environmental allergies    • Heart palpitations    • Hyperlipidemia    • IGT (impaired glucose tolerance)    • Migraines    • Mild concentric left ventricular hypertrophy (LVH)    •  Mitral regurgitation     trivial to mild   • PSVT (paroxysmal supraventricular tachycardia)    • Pulmonic regurgitation     trace   • SVT (supraventricular tachycardia)    • Wheezing        Past Surgical History:   Procedure Laterality Date   • CARDIAC ABLATION  02/14/2014   • CARDIAC CATHETERIZATION  01/06/2014   • CARDIAC CATHETERIZATION N/A 1/24/2018    Procedure: Right and Left Heart Cath;  Surgeon: Fabián Morales MD;  Location: [facility] CATH INVASIVE LOCATION;  Service:    • CARDIAC CATHETERIZATION N/A 1/24/2018    Procedure: Coronary angiography;  Surgeon: Fabián Morales MD;  Location: [facility] CATH INVASIVE LOCATION;  Service:    • CARDIAC CATHETERIZATION N/A 1/24/2018    Procedure: Left ventriculography;  Surgeon: Fabián Morales MD;  Location: [facility] CATH INVASIVE LOCATION;  Service:    • CATARACT EXTRACTION     • TONSILLECTOMY         No Known Allergies      Current Outpatient Prescriptions:   •  calcipotriene-betamethasone (TACLONEX) 0.005-0.064 % external suspension, Apply  topically Daily., Disp: , Rfl:   •  Coconut Oil (EQL COCONUT OIL) 1000 MG capsule, Take 1,000 mg by mouth., Disp: , Rfl:   •  fluticasone (FLONASE) 50 MCG/ACT nasal spray, 2 sprays into each nostril Daily., Disp: , Rfl:   •  Multiple Vitamins-Minerals (MULTIVITAMIN ADULTS PO), Take  by mouth., Disp: , Rfl:   •  vitamin D (ERGOCALCIFEROL) 69382 UNITS capsule capsule, TAKE ONE CAPSULE BY MOUTH ONCE WEEKLY, Disp: 13 capsule, Rfl: 3    Social History     Social History   • Marital status:      Spouse name: N/A   • Number of children: N/A   • Years of education: N/A     Occupational History   • Not on file.     Social History Main Topics   • Smoking status: Never Smoker   • Smokeless tobacco: Never Used   • Alcohol use 0.6 oz/week     1 Glasses of wine per week      Comment: social / caffeine use   • Drug use: No   • Sexual activity: Defer     Other Topics Concern   • Not on file     Social History Narrative        Family History   Problem Relation Age of Onset   • Hyperlipidemia Mother    • Stroke Mother    • Hypertension Father    • Breast cancer Maternal Grandmother    • Breast cancer Paternal Uncle    • Diabetes Maternal Grandfather            Review of Systems:  Review of Systems   Constitutional: Positive for activity change and fatigue.   HENT: Positive for congestion.    Eyes: Negative.    Respiratory: Positive for cough, chest tightness and shortness of breath.    Cardiovascular: Positive for chest pain.   Gastrointestinal: Negative.    Endocrine: Negative.    Genitourinary: Negative.    Musculoskeletal: Negative.    Skin: Negative.    Allergic/Immunologic: Negative.    Neurological: Positive for dizziness.   Hematological: Negative.    Psychiatric/Behavioral: Negative.      Cardiovascular ROS: positive for - dyspnea on exertion, paroxysmal nocturnal dyspnea, shortness of breath and dizziness  Physical Exam:    Vital Signs:  Weight: 81.6 kg (180 lb)   Body mass index is 29.95 kg/(m^2).  Temp: 98.4 °F (36.9 °C)   Heart Rate: 87   BP: 147/82     Physical Exam   Constitutional: She is oriented to person, place, and time. She appears well-developed and well-nourished. No distress.   HENT:   Head: Normocephalic and atraumatic.   Eyes: Conjunctivae and EOM are normal. Pupils are equal, round, and reactive to light. Right eye exhibits no discharge. Left eye exhibits no discharge. No scleral icterus.   Neck: Normal range of motion. Neck supple. No JVD present. No tracheal deviation present. No thyromegaly present.   Cardiovascular: Normal rate and regular rhythm.  PMI is not displaced.  Exam reveals no gallop and no friction rub.    Murmur heard.   Harsh midsystolic murmur is present with a grade of 3/6  at the upper right sternal border radiating to the neck  Pulses:       Carotid pulses are 1+ on the right side with bruit, and 1+ on the left side with bruit.       Radial pulses are 2+ on the right side, and 2+ on the  left side.        Femoral pulses are 2+ on the right side, and 2+ on the left side.       Popliteal pulses are 2+ on the right side, and 2+ on the left side.        Dorsalis pedis pulses are 2+ on the right side, and 2+ on the left side.        Posterior tibial pulses are 2+ on the right side, and 2+ on the left side.   Pulmonary/Chest: Effort normal and breath sounds normal. No stridor. No respiratory distress. She has no wheezes. She has no rales. She exhibits no tenderness.   Abdominal: Soft. Bowel sounds are normal. She exhibits no distension and no mass. There is no tenderness. There is no rebound and no guarding.   Musculoskeletal: Normal range of motion. She exhibits no edema, tenderness or deformity.   Lymphadenopathy:     She has no cervical adenopathy.   Neurological: She is alert and oriented to person, place, and time. She has normal reflexes. She displays normal reflexes. No cranial nerve deficit. She exhibits normal muscle tone. Coordination normal.   Skin: Skin is warm and dry. No rash noted. She is not diaphoretic. No erythema. There is pallor.   Psychiatric: She has a normal mood and affect. Her behavior is normal. Judgment and thought content normal.        Assessment:     She has severe symptomatic aortic stenosis with class III symptoms.  The cough is a little bit unusual and is been present since the fall.  There is no recent upper respiratory infections.            Recommendation/Plan:       We'll plan aortic valve replacement next Wednesday with a tissue valve via mini sternotomy.  We went over all the possibilities were valve replacement and she would prefer a tissue valve which I think is the right thing to do.  All questions were answered and we'll plan for surgery next week.        Thank you for allowing me to participate in her care.    Regards,    Jean Claude Jones MD

## 2018-02-02 ENCOUNTER — PREP FOR SURGERY (OUTPATIENT)
Dept: OTHER | Facility: HOSPITAL | Age: 63
End: 2018-02-02

## 2018-02-02 ENCOUNTER — HOSPITAL ENCOUNTER (OUTPATIENT)
Facility: HOSPITAL | Age: 63
Setting detail: SURGERY ADMIT
End: 2018-02-02
Attending: THORACIC SURGERY (CARDIOTHORACIC VASCULAR SURGERY) | Admitting: THORACIC SURGERY (CARDIOTHORACIC VASCULAR SURGERY)

## 2018-02-02 DIAGNOSIS — I35.0 AORTIC STENOSIS: Primary | ICD-10-CM

## 2018-02-02 RX ORDER — CHLORHEXIDINE GLUCONATE 0.12 MG/ML
15 RINSE ORAL EVERY 12 HOURS
Status: CANCELLED | OUTPATIENT
Start: 2018-02-02 | End: 2018-02-03

## 2018-02-02 RX ORDER — CHLORHEXIDINE GLUCONATE 500 MG/1
1 CLOTH TOPICAL EVERY 12 HOURS PRN
Status: CANCELLED | OUTPATIENT
Start: 2018-02-02

## 2018-02-02 RX ORDER — CHLORHEXIDINE GLUCONATE 0.12 MG/ML
15 RINSE ORAL ONCE
Status: CANCELLED | OUTPATIENT
Start: 2018-02-02 | End: 2018-02-02

## 2018-02-02 RX ORDER — CEFAZOLIN SODIUM 2 G/100ML
2 INJECTION, SOLUTION INTRAVENOUS ONCE
Status: CANCELLED | OUTPATIENT
Start: 2018-02-02 | End: 2018-02-02

## 2018-02-06 ENCOUNTER — APPOINTMENT (OUTPATIENT)
Dept: PREADMISSION TESTING | Facility: HOSPITAL | Age: 63
End: 2018-02-06

## 2018-02-06 ENCOUNTER — APPOINTMENT (OUTPATIENT)
Dept: CARDIOLOGY | Facility: HOSPITAL | Age: 63
End: 2018-02-06

## 2018-02-07 ENCOUNTER — RESULTS ENCOUNTER (OUTPATIENT)
Dept: OTHER | Facility: HOSPITAL | Age: 63
End: 2018-02-07

## 2018-02-07 DIAGNOSIS — I35.0 AORTIC STENOSIS: ICD-10-CM

## 2018-02-14 ENCOUNTER — APPOINTMENT (OUTPATIENT)
Dept: CARDIOLOGY | Facility: HOSPITAL | Age: 63
End: 2018-02-14

## 2018-02-14 ENCOUNTER — APPOINTMENT (OUTPATIENT)
Dept: PREADMISSION TESTING | Facility: HOSPITAL | Age: 63
End: 2018-02-14

## 2018-02-19 RX ORDER — ERGOCALCIFEROL 1.25 MG/1
CAPSULE ORAL
Qty: 12 CAPSULE | Refills: 2 | Status: SHIPPED | OUTPATIENT
Start: 2018-02-19 | End: 2018-02-26

## 2018-02-20 ENCOUNTER — PREP FOR SURGERY (OUTPATIENT)
Dept: OTHER | Facility: HOSPITAL | Age: 63
End: 2018-02-20

## 2018-02-20 DIAGNOSIS — I35.0 AORTIC VALVE STENOSIS, ETIOLOGY OF CARDIAC VALVE DISEASE UNSPECIFIED: Primary | ICD-10-CM

## 2018-02-20 RX ORDER — CHLORHEXIDINE GLUCONATE 0.12 MG/ML
15 RINSE ORAL ONCE
Status: CANCELLED | OUTPATIENT
Start: 2018-02-27 | End: 2018-02-27

## 2018-02-20 RX ORDER — CEFAZOLIN SODIUM 2 G/100ML
2 INJECTION, SOLUTION INTRAVENOUS ONCE
Status: CANCELLED | OUTPATIENT
Start: 2018-02-27 | End: 2018-02-27

## 2018-02-20 RX ORDER — CHLORHEXIDINE GLUCONATE 500 MG/1
1 CLOTH TOPICAL EVERY 12 HOURS PRN
Status: CANCELLED | OUTPATIENT
Start: 2018-02-27

## 2018-02-20 RX ORDER — CHLORHEXIDINE GLUCONATE 500 MG/1
1 CLOTH TOPICAL EVERY 12 HOURS PRN
Status: CANCELLED | OUTPATIENT
Start: 2018-02-20

## 2018-02-20 RX ORDER — CHLORHEXIDINE GLUCONATE 0.12 MG/ML
15 RINSE ORAL EVERY 12 HOURS
Status: CANCELLED | OUTPATIENT
Start: 2018-02-20 | End: 2018-02-21

## 2018-02-21 ENCOUNTER — TELEPHONE (OUTPATIENT)
Dept: CARDIAC SURGERY | Facility: CLINIC | Age: 63
End: 2018-02-21

## 2018-02-25 ENCOUNTER — RESULTS ENCOUNTER (OUTPATIENT)
Dept: OTHER | Facility: HOSPITAL | Age: 63
End: 2018-02-25

## 2018-02-25 DIAGNOSIS — I35.0 AORTIC VALVE STENOSIS, ETIOLOGY OF CARDIAC VALVE DISEASE UNSPECIFIED: ICD-10-CM

## 2018-02-26 ENCOUNTER — ANESTHESIA EVENT (OUTPATIENT)
Dept: PERIOP | Facility: HOSPITAL | Age: 63
End: 2018-02-26

## 2018-02-26 ENCOUNTER — HOSPITAL ENCOUNTER (OUTPATIENT)
Dept: CARDIOLOGY | Facility: HOSPITAL | Age: 63
Discharge: HOME | End: 2018-02-26
Admitting: NURSE PRACTITIONER

## 2018-02-26 ENCOUNTER — HOSPITAL ENCOUNTER (OUTPATIENT)
Dept: GENERAL RADIOLOGY | Facility: HOSPITAL | Age: 63
Discharge: HOME | End: 2018-02-26

## 2018-02-26 ENCOUNTER — APPOINTMENT (OUTPATIENT)
Dept: PREADMISSION TESTING | Facility: HOSPITAL | Age: 63
End: 2018-02-26

## 2018-02-26 VITALS
RESPIRATION RATE: 18 BRPM | SYSTOLIC BLOOD PRESSURE: 147 MMHG | WEIGHT: 182 LBS | TEMPERATURE: 97 F | HEIGHT: 65 IN | BODY MASS INDEX: 30.32 KG/M2 | HEART RATE: 90 BPM | DIASTOLIC BLOOD PRESSURE: 82 MMHG | OXYGEN SATURATION: 99 %

## 2018-02-26 DIAGNOSIS — I35.0 AORTIC VALVE STENOSIS, ETIOLOGY OF CARDIAC VALVE DISEASE UNSPECIFIED: ICD-10-CM

## 2018-02-26 LAB
ABO GROUP BLD: NORMAL
ALBUMIN SERPL-MCNC: 3.9 G/DL (ref 3.5–5.2)
ALBUMIN/GLOB SERPL: 1.1 G/DL
ALP SERPL-CCNC: 88 U/L (ref 39–117)
ALT SERPL W P-5'-P-CCNC: 35 U/L (ref 1–33)
ANION GAP SERPL CALCULATED.3IONS-SCNC: 9.5 MMOL/L
APTT PPP: 25.8 SECONDS (ref 22.7–35.4)
ARTERIAL PATENCY WRIST A: POSITIVE
AST SERPL-CCNC: 24 U/L (ref 1–32)
ATMOSPHERIC PRESS: 759 MMHG
BASE EXCESS BLDA CALC-SCNC: 2 MMOL/L (ref 0–2)
BASOPHILS # BLD AUTO: 0.03 10*3/MM3 (ref 0–0.2)
BASOPHILS NFR BLD AUTO: 0.5 % (ref 0–1.5)
BDY SITE: NORMAL
BH CV XLRA MEAS LEFT DIST CCA EDV: -19.9 CM/SEC
BH CV XLRA MEAS LEFT DIST CCA PSV: -65.1 CM/SEC
BH CV XLRA MEAS LEFT DIST ICA EDV: -28.3 CM/SEC
BH CV XLRA MEAS LEFT DIST ICA PSV: -69.1 CM/SEC
BH CV XLRA MEAS LEFT MID ICA EDV: -30.6 CM/SEC
BH CV XLRA MEAS LEFT MID ICA PSV: -65.6 CM/SEC
BH CV XLRA MEAS LEFT PROX CCA EDV: 17.6 CM/SEC
BH CV XLRA MEAS LEFT PROX CCA PSV: 59.8 CM/SEC
BH CV XLRA MEAS LEFT PROX ECA EDV: -14.1 CM/SEC
BH CV XLRA MEAS LEFT PROX ECA PSV: -59.3 CM/SEC
BH CV XLRA MEAS LEFT PROX ICA EDV: -25.9 CM/SEC
BH CV XLRA MEAS LEFT PROX ICA PSV: -57.4 CM/SEC
BH CV XLRA MEAS LEFT PROX SCLA PSV: 90.9 CM/SEC
BH CV XLRA MEAS LEFT VERTEBRAL A EDV: 13.4 CM/SEC
BH CV XLRA MEAS LEFT VERTEBRAL A PSV: 48.7 CM/SEC
BH CV XLRA MEAS RIGHT DIST CCA EDV: -19.9 CM/SEC
BH CV XLRA MEAS RIGHT DIST CCA PSV: -69.2 CM/SEC
BH CV XLRA MEAS RIGHT DIST ICA EDV: -25.2 CM/SEC
BH CV XLRA MEAS RIGHT DIST ICA PSV: -62.1 CM/SEC
BH CV XLRA MEAS RIGHT MID ICA EDV: -26.4 CM/SEC
BH CV XLRA MEAS RIGHT MID ICA PSV: -72.1 CM/SEC
BH CV XLRA MEAS RIGHT PROX CCA EDV: 14.1 CM/SEC
BH CV XLRA MEAS RIGHT PROX CCA PSV: 63.9 CM/SEC
BH CV XLRA MEAS RIGHT PROX ECA EDV: -14.1 CM/SEC
BH CV XLRA MEAS RIGHT PROX ECA PSV: -69.2 CM/SEC
BH CV XLRA MEAS RIGHT PROX ICA EDV: -21.1 CM/SEC
BH CV XLRA MEAS RIGHT PROX ICA PSV: -70.9 CM/SEC
BH CV XLRA MEAS RIGHT PROX SCLA PSV: 82.1 CM/SEC
BH CV XLRA MEAS RIGHT VERTEBRAL A EDV: 18.2 CM/SEC
BH CV XLRA MEAS RIGHT VERTEBRAL A PSV: 53.4 CM/SEC
BILIRUB SERPL-MCNC: 0.5 MG/DL (ref 0.1–1.2)
BILIRUB UR QL STRIP: NEGATIVE
BLD GP AB SCN SERPL QL: NEGATIVE
BUN BLD-MCNC: 16 MG/DL (ref 8–23)
BUN/CREAT SERPL: 14.4 (ref 7–25)
CALCIUM SPEC-SCNC: 9.4 MG/DL (ref 8.6–10.5)
CHLORIDE SERPL-SCNC: 99 MMOL/L (ref 98–107)
CHOLEST SERPL-MCNC: 198 MG/DL (ref 0–200)
CLARITY UR: CLEAR
CLOSE TME COLL+ADP + EPINEP PNL BLD: 99 % (ref 86–100)
CO2 SERPL-SCNC: 28.5 MMOL/L (ref 22–29)
COLOR UR: YELLOW
CREAT BLD-MCNC: 1.11 MG/DL (ref 0.57–1)
DEPRECATED RDW RBC AUTO: 45.3 FL (ref 37–54)
EOSINOPHIL # BLD AUTO: 0.23 10*3/MM3 (ref 0–0.7)
EOSINOPHIL NFR BLD AUTO: 3.7 % (ref 0.3–6.2)
ERYTHROCYTE [DISTWIDTH] IN BLOOD BY AUTOMATED COUNT: 14.3 % (ref 11.7–13)
GFR SERPL CREATININE-BSD FRML MDRD: 49 ML/MIN/1.73
GLOBULIN UR ELPH-MCNC: 3.6 GM/DL
GLUCOSE BLD-MCNC: 117 MG/DL (ref 65–99)
GLUCOSE UR STRIP-MCNC: NEGATIVE MG/DL
HBA1C MFR BLD: 5.5 % (ref 4.8–5.6)
HCO3 BLDA-SCNC: 26.1 MMOL/L (ref 22–28)
HCT VFR BLD AUTO: 42.8 % (ref 35.6–45.5)
HDLC SERPL-MCNC: 48 MG/DL (ref 40–60)
HGB BLD-MCNC: 14 G/DL (ref 11.9–15.5)
HGB UR QL STRIP.AUTO: NEGATIVE
IMM GRANULOCYTES # BLD: 0 10*3/MM3 (ref 0–0.03)
IMM GRANULOCYTES NFR BLD: 0 % (ref 0–0.5)
INR PPP: 1.01 (ref 0.9–1.1)
KETONES UR QL STRIP: NEGATIVE
LDLC SERPL CALC-MCNC: 125 MG/DL (ref 0–100)
LDLC/HDLC SERPL: 2.61 {RATIO}
LEFT ARM BP: NORMAL MMHG
LEUKOCYTE ESTERASE UR QL STRIP.AUTO: NEGATIVE
LYMPHOCYTES # BLD AUTO: 2.23 10*3/MM3 (ref 0.9–4.8)
LYMPHOCYTES NFR BLD AUTO: 36 % (ref 19.6–45.3)
MCH RBC QN AUTO: 28.5 PG (ref 26.9–32)
MCHC RBC AUTO-ENTMCNC: 32.7 G/DL (ref 32.4–36.3)
MCV RBC AUTO: 87 FL (ref 80.5–98.2)
MODALITY: NORMAL
MONOCYTES # BLD AUTO: 0.65 10*3/MM3 (ref 0.2–1.2)
MONOCYTES NFR BLD AUTO: 10.5 % (ref 5–12)
NEUTROPHILS # BLD AUTO: 3.05 10*3/MM3 (ref 1.9–8.1)
NEUTROPHILS NFR BLD AUTO: 49.3 % (ref 42.7–76)
NITRITE UR QL STRIP: NEGATIVE
NT-PROBNP SERPL-MCNC: 92.5 PG/ML (ref 5–900)
PCO2 BLDA: 38.3 MM HG (ref 35–45)
PH BLDA: 7.44 PH UNITS (ref 7.35–7.45)
PH UR STRIP.AUTO: 6 [PH] (ref 5–8)
PLATELET # BLD AUTO: 195 10*3/MM3 (ref 140–500)
PMV BLD AUTO: 10.6 FL (ref 6–12)
PO2 BLDA: 86.5 MM HG (ref 80–100)
POTASSIUM BLD-SCNC: 3.9 MMOL/L (ref 3.5–5.2)
PROT SERPL-MCNC: 7.5 G/DL (ref 6–8.5)
PROT UR QL STRIP: NEGATIVE
PROTHROMBIN TIME: 13.1 SECONDS (ref 11.7–14.2)
RBC # BLD AUTO: 4.92 10*6/MM3 (ref 3.9–5.2)
RH BLD: NEGATIVE
RIGHT ARM BP: NORMAL MMHG
SAO2 % BLDCOA: 97 % (ref 92–99)
SODIUM BLD-SCNC: 137 MMOL/L (ref 136–145)
SP GR UR STRIP: 1.01 (ref 1–1.03)
TOTAL RATE: 16 BREATHS/MINUTE
TRIGL SERPL-MCNC: 123 MG/DL (ref 0–150)
UROBILINOGEN UR QL STRIP: NORMAL
VLDLC SERPL-MCNC: 24.6 MG/DL (ref 5–40)
WBC NRBC COR # BLD: 6.19 10*3/MM3 (ref 4.5–10.7)

## 2018-02-26 PROCEDURE — 86850 RBC ANTIBODY SCREEN: CPT | Performed by: NURSE PRACTITIONER

## 2018-02-26 PROCEDURE — 36415 COLL VENOUS BLD VENIPUNCTURE: CPT | Performed by: NURSE PRACTITIONER

## 2018-02-26 PROCEDURE — 71046 X-RAY EXAM CHEST 2 VIEWS: CPT

## 2018-02-26 PROCEDURE — 93010 ELECTROCARDIOGRAM REPORT: CPT | Performed by: INTERNAL MEDICINE

## 2018-02-26 PROCEDURE — 80061 LIPID PANEL: CPT | Performed by: NURSE PRACTITIONER

## 2018-02-26 PROCEDURE — 80053 COMPREHEN METABOLIC PANEL: CPT | Performed by: NURSE PRACTITIONER

## 2018-02-26 PROCEDURE — 36600 WITHDRAWAL OF ARTERIAL BLOOD: CPT | Performed by: INTERNAL MEDICINE

## 2018-02-26 PROCEDURE — 81003 URINALYSIS AUTO W/O SCOPE: CPT | Performed by: NURSE PRACTITIONER

## 2018-02-26 PROCEDURE — 86901 BLOOD TYPING SEROLOGIC RH(D): CPT | Performed by: NURSE PRACTITIONER

## 2018-02-26 PROCEDURE — 93005 ELECTROCARDIOGRAM TRACING: CPT

## 2018-02-26 PROCEDURE — 86900 BLOOD TYPING SEROLOGIC ABO: CPT | Performed by: NURSE PRACTITIONER

## 2018-02-26 PROCEDURE — 82803 BLOOD GASES ANY COMBINATION: CPT | Performed by: INTERNAL MEDICINE

## 2018-02-26 PROCEDURE — 85025 COMPLETE CBC W/AUTO DIFF WBC: CPT | Performed by: NURSE PRACTITIONER

## 2018-02-26 PROCEDURE — 85576 BLOOD PLATELET AGGREGATION: CPT | Performed by: NURSE PRACTITIONER

## 2018-02-26 PROCEDURE — 85610 PROTHROMBIN TIME: CPT | Performed by: NURSE PRACTITIONER

## 2018-02-26 PROCEDURE — 83880 ASSAY OF NATRIURETIC PEPTIDE: CPT | Performed by: NURSE PRACTITIONER

## 2018-02-26 PROCEDURE — 86920 COMPATIBILITY TEST SPIN: CPT

## 2018-02-26 PROCEDURE — 93880 EXTRACRANIAL BILAT STUDY: CPT

## 2018-02-26 PROCEDURE — 85730 THROMBOPLASTIN TIME PARTIAL: CPT | Performed by: NURSE PRACTITIONER

## 2018-02-26 PROCEDURE — 83036 HEMOGLOBIN GLYCOSYLATED A1C: CPT | Performed by: NURSE PRACTITIONER

## 2018-02-26 RX ORDER — LORATADINE 10 MG/1
10 TABLET ORAL DAILY
COMMUNITY

## 2018-02-26 RX ORDER — ERGOCALCIFEROL 1.25 MG/1
50000 CAPSULE ORAL WEEKLY
COMMUNITY
End: 2018-10-22 | Stop reason: SDUPTHER

## 2018-02-26 RX ORDER — CHLORHEXIDINE GLUCONATE 0.12 MG/ML
15 RINSE ORAL EVERY 12 HOURS
Status: DISPENSED | OUTPATIENT
Start: 2018-02-26 | End: 2018-02-27

## 2018-02-26 RX ORDER — CHLORHEXIDINE GLUCONATE 0.12 MG/ML
15 RINSE ORAL
COMMUNITY
End: 2018-03-03 | Stop reason: HOSPADM

## 2018-02-26 RX ORDER — CHLORHEXIDINE GLUCONATE 500 MG/1
CLOTH TOPICAL
COMMUNITY
End: 2018-03-03 | Stop reason: HOSPADM

## 2018-02-26 RX ORDER — CHLORHEXIDINE GLUCONATE 500 MG/1
1 CLOTH TOPICAL EVERY 12 HOURS PRN
Status: ACTIVE | OUTPATIENT
Start: 2018-02-26

## 2018-02-27 ENCOUNTER — HOSPITAL ENCOUNTER (INPATIENT)
Facility: HOSPITAL | Age: 63
LOS: 4 days | Discharge: HOME HEALTH | End: 2018-03-03
Attending: THORACIC SURGERY (CARDIOTHORACIC VASCULAR SURGERY) | Admitting: THORACIC SURGERY (CARDIOTHORACIC VASCULAR SURGERY)

## 2018-02-27 ENCOUNTER — APPOINTMENT (OUTPATIENT)
Dept: PERIOP | Facility: HOSPITAL | Age: 63
End: 2018-02-27
Attending: ANESTHESIOLOGY

## 2018-02-27 ENCOUNTER — APPOINTMENT (OUTPATIENT)
Dept: GENERAL RADIOLOGY | Facility: HOSPITAL | Age: 63
End: 2018-02-27

## 2018-02-27 ENCOUNTER — ANESTHESIA (OUTPATIENT)
Dept: PERIOP | Facility: HOSPITAL | Age: 63
End: 2018-02-27

## 2018-02-27 DIAGNOSIS — I47.1 PAT (PAROXYSMAL ATRIAL TACHYCARDIA) (HCC): ICD-10-CM

## 2018-02-27 DIAGNOSIS — R53.1 GENERALIZED WEAKNESS: Primary | ICD-10-CM

## 2018-02-27 DIAGNOSIS — I35.0 AORTIC VALVE STENOSIS, ETIOLOGY OF CARDIAC VALVE DISEASE UNSPECIFIED: ICD-10-CM

## 2018-02-27 DIAGNOSIS — M85.80 OSTEOPENIA, UNSPECIFIED LOCATION: ICD-10-CM

## 2018-02-27 DIAGNOSIS — M88.9 PAGET'S BONE DISEASE: ICD-10-CM

## 2018-02-27 DIAGNOSIS — G43.009 MIGRAINE WITHOUT AURA AND WITHOUT STATUS MIGRAINOSUS, NOT INTRACTABLE: ICD-10-CM

## 2018-02-27 DIAGNOSIS — I35.0 NONRHEUMATIC AORTIC VALVE STENOSIS: ICD-10-CM

## 2018-02-27 LAB
ACT BLD: 114 SECONDS (ref 82–152)
ACT BLD: 125 SECONDS (ref 82–152)
ACT BLD: 356 SECONDS (ref 82–152)
ACT BLD: 428 SECONDS (ref 82–152)
ACT BLD: 499 SECONDS (ref 82–152)
ALBUMIN SERPL-MCNC: 3.8 G/DL (ref 3.5–5.2)
ALBUMIN SERPL-MCNC: 4.5 G/DL (ref 3.5–5.2)
ANION GAP SERPL CALCULATED.3IONS-SCNC: 12.9 MMOL/L
ANION GAP SERPL CALCULATED.3IONS-SCNC: 14.1 MMOL/L
APTT PPP: 31.3 SECONDS (ref 22.7–35.4)
ARTERIAL PATENCY WRIST A: ABNORMAL
ARTERIAL PATENCY WRIST A: ABNORMAL
ARTERIAL PATENCY WRIST A: NORMAL
ATMOSPHERIC PRESS: 753.7 MMHG
ATMOSPHERIC PRESS: 756.4 MMHG
ATMOSPHERIC PRESS: 758.1 MMHG
BASE EXCESS BLDA CALC-SCNC: -0.6 MMOL/L (ref 0–2)
BASE EXCESS BLDA CALC-SCNC: -0.8 MMOL/L (ref 0–2)
BASE EXCESS BLDA CALC-SCNC: 1.1 MMOL/L (ref 0–2)
BASOPHILS # BLD AUTO: 0.03 10*3/MM3 (ref 0–0.2)
BASOPHILS NFR BLD AUTO: 0.4 % (ref 0–1.5)
BDY SITE: ABNORMAL
BDY SITE: ABNORMAL
BDY SITE: NORMAL
BUN BLD-MCNC: 13 MG/DL (ref 8–23)
BUN BLD-MCNC: 14 MG/DL (ref 8–23)
BUN/CREAT SERPL: 11 (ref 7–25)
BUN/CREAT SERPL: 12 (ref 7–25)
CA-I BLD-MCNC: 5 MG/DL (ref 4.6–5.4)
CA-I SERPL ISE-MCNC: 1.25 MMOL/L (ref 1.15–1.35)
CALCIUM SPEC-SCNC: 8.7 MG/DL (ref 8.6–10.5)
CALCIUM SPEC-SCNC: 8.7 MG/DL (ref 8.6–10.5)
CHLORIDE SERPL-SCNC: 104 MMOL/L (ref 98–107)
CHLORIDE SERPL-SCNC: 105 MMOL/L (ref 98–107)
CO2 SERPL-SCNC: 22.1 MMOL/L (ref 22–29)
CO2 SERPL-SCNC: 22.9 MMOL/L (ref 22–29)
CREAT BLD-MCNC: 1.17 MG/DL (ref 0.57–1)
CREAT BLD-MCNC: 1.18 MG/DL (ref 0.57–1)
DEPRECATED RDW RBC AUTO: 45.7 FL (ref 37–54)
DEPRECATED RDW RBC AUTO: 46 FL (ref 37–54)
EOSINOPHIL # BLD AUTO: 0.17 10*3/MM3 (ref 0–0.7)
EOSINOPHIL NFR BLD AUTO: 2.3 % (ref 0.3–6.2)
ERYTHROCYTE [DISTWIDTH] IN BLOOD BY AUTOMATED COUNT: 14.3 % (ref 11.7–13)
ERYTHROCYTE [DISTWIDTH] IN BLOOD BY AUTOMATED COUNT: 14.5 % (ref 11.7–13)
FIBRINOGEN PPP-MCNC: 189 MG/DL (ref 219–464)
GFR SERPL CREATININE-BSD FRML MDRD: 46 ML/MIN/1.73
GFR SERPL CREATININE-BSD FRML MDRD: 47 ML/MIN/1.73
GLUCOSE BLD-MCNC: 102 MG/DL (ref 65–99)
GLUCOSE BLD-MCNC: 96 MG/DL (ref 65–99)
GLUCOSE BLDC GLUCOMTR-MCNC: 171 MG/DL (ref 70–130)
GLUCOSE BLDC GLUCOMTR-MCNC: 176 MG/DL (ref 70–130)
GLUCOSE BLDC GLUCOMTR-MCNC: 188 MG/DL (ref 70–130)
HCO3 BLDA-SCNC: 21.6 MMOL/L (ref 22–28)
HCO3 BLDA-SCNC: 23.8 MMOL/L (ref 22–28)
HCO3 BLDA-SCNC: 26.5 MMOL/L (ref 22–28)
HCT VFR BLD AUTO: 27.6 % (ref 35.6–45.5)
HCT VFR BLD AUTO: 32 % (ref 35.6–45.5)
HGB BLD-MCNC: 10.6 G/DL (ref 11.9–15.5)
HGB BLD-MCNC: 9.1 G/DL (ref 11.9–15.5)
HOROWITZ INDEX BLD+IHG-RTO: 100 %
HOROWITZ INDEX BLD+IHG-RTO: 30 %
HOROWITZ INDEX BLD+IHG-RTO: 30 %
IMM GRANULOCYTES # BLD: 0.03 10*3/MM3 (ref 0–0.03)
IMM GRANULOCYTES NFR BLD: 0.4 % (ref 0–0.5)
INR PPP: 1.5 (ref 0.9–1.1)
LYMPHOCYTES # BLD AUTO: 2.06 10*3/MM3 (ref 0.9–4.8)
LYMPHOCYTES NFR BLD AUTO: 27.8 % (ref 19.6–45.3)
MAGNESIUM SERPL-MCNC: 2.3 MG/DL (ref 1.6–2.4)
MAGNESIUM SERPL-MCNC: 2.6 MG/DL (ref 1.6–2.4)
MCH RBC QN AUTO: 28.3 PG (ref 26.9–32)
MCH RBC QN AUTO: 28.6 PG (ref 26.9–32)
MCHC RBC AUTO-ENTMCNC: 33 G/DL (ref 32.4–36.3)
MCHC RBC AUTO-ENTMCNC: 33.1 G/DL (ref 32.4–36.3)
MCV RBC AUTO: 86 FL (ref 80.5–98.2)
MCV RBC AUTO: 86.3 FL (ref 80.5–98.2)
MODALITY: ABNORMAL
MODALITY: ABNORMAL
MODALITY: NORMAL
MONOCYTES # BLD AUTO: 0.32 10*3/MM3 (ref 0.2–1.2)
MONOCYTES NFR BLD AUTO: 4.3 % (ref 5–12)
NEUTROPHILS # BLD AUTO: 4.79 10*3/MM3 (ref 1.9–8.1)
NEUTROPHILS NFR BLD AUTO: 64.8 % (ref 42.7–76)
O2 A-A PPRESDIFF RESPIRATORY: 0.5 MMHG
O2 A-A PPRESDIFF RESPIRATORY: 0.5 MMHG
O2 A-A PPRESDIFF RESPIRATORY: 0.7 MMHG
PCO2 BLDA: 27.5 MM HG (ref 35–45)
PCO2 BLDA: 37.1 MM HG (ref 35–45)
PCO2 BLDA: 44.5 MM HG (ref 35–45)
PEEP RESPIRATORY: 7.5 CM[H2O]
PH BLDA: 7.38 PH UNITS (ref 7.35–7.45)
PH BLDA: 7.42 PH UNITS (ref 7.35–7.45)
PH BLDA: 7.5 PH UNITS (ref 7.35–7.45)
PHOSPHATE SERPL-MCNC: 2.6 MG/DL (ref 2.5–4.5)
PHOSPHATE SERPL-MCNC: 3.2 MG/DL (ref 2.5–4.5)
PLATELET # BLD AUTO: 111 10*3/MM3 (ref 140–500)
PLATELET # BLD AUTO: 119 10*3/MM3 (ref 140–500)
PMV BLD AUTO: 10.3 FL (ref 6–12)
PMV BLD AUTO: 10.6 FL (ref 6–12)
PO2 BLDA: 101.7 MM HG (ref 80–100)
PO2 BLDA: 468.6 MM HG (ref 80–100)
PO2 BLDA: 85.3 MM HG (ref 80–100)
POTASSIUM BLD-SCNC: 4.3 MMOL/L (ref 3.5–5.2)
POTASSIUM BLD-SCNC: 4.3 MMOL/L (ref 3.5–5.2)
POTASSIUM BLD-SCNC: 4.6 MMOL/L (ref 3.5–5.2)
PROTHROMBIN TIME: 17.8 SECONDS (ref 11.7–14.2)
PSV: 8 CMH2O
RBC # BLD AUTO: 3.21 10*6/MM3 (ref 3.9–5.2)
RBC # BLD AUTO: 3.71 10*6/MM3 (ref 3.9–5.2)
SAO2 % BLDCOA: 100 % (ref 92–99)
SAO2 % BLDCOA: 96.2 % (ref 92–99)
SAO2 % BLDCOA: 98.5 % (ref 92–99)
SET MECH RESP RATE: 14
SET MECH RESP RATE: 14
SODIUM BLD-SCNC: 139 MMOL/L (ref 136–145)
SODIUM BLD-SCNC: 142 MMOL/L (ref 136–145)
TOTAL RATE: 11 BREATHS/MINUTE
TOTAL RATE: 14 BREATHS/MINUTE
TOTAL RATE: 14 BREATHS/MINUTE
VENTILATOR MODE: ABNORMAL
VENTILATOR MODE: ABNORMAL
VENTILATOR MODE: NORMAL
VT ON VENT VENT: 502 ML
VT ON VENT VENT: 594 ML
VT ON VENT VENT: 627 ML
WBC NRBC COR # BLD: 7.4 10*3/MM3 (ref 4.5–10.7)
WBC NRBC COR # BLD: 7.56 10*3/MM3 (ref 4.5–10.7)

## 2018-02-27 PROCEDURE — 25010000002 MORPHINE PER 10 MG: Performed by: THORACIC SURGERY (CARDIOTHORACIC VASCULAR SURGERY)

## 2018-02-27 PROCEDURE — 33405 REPLACEMENT AORTIC VALVE OPN: CPT | Performed by: THORACIC SURGERY (CARDIOTHORACIC VASCULAR SURGERY)

## 2018-02-27 PROCEDURE — 94799 UNLISTED PULMONARY SVC/PX: CPT

## 2018-02-27 PROCEDURE — 02RF08Z REPLACEMENT OF AORTIC VALVE WITH ZOOPLASTIC TISSUE, OPEN APPROACH: ICD-10-PCS | Performed by: THORACIC SURGERY (CARDIOTHORACIC VASCULAR SURGERY)

## 2018-02-27 PROCEDURE — 25010000002 PHENYLEPHRINE PER 1 ML: Performed by: ANESTHESIOLOGY

## 2018-02-27 PROCEDURE — 25010000002 MAGNESIUM SULFATE IN D5W 1G/100ML (PREMIX) 1-5 GM/100ML-% SOLUTION: Performed by: THORACIC SURGERY (CARDIOTHORACIC VASCULAR SURGERY)

## 2018-02-27 PROCEDURE — 3E080GC INTRODUCTION OF OTHER THERAPEUTIC SUBSTANCE INTO HEART, OPEN APPROACH: ICD-10-PCS | Performed by: THORACIC SURGERY (CARDIOTHORACIC VASCULAR SURGERY)

## 2018-02-27 PROCEDURE — 25010000002 PROPOFOL 1000 MG/ML EMULSION: Performed by: THORACIC SURGERY (CARDIOTHORACIC VASCULAR SURGERY)

## 2018-02-27 PROCEDURE — 88305 TISSUE EXAM BY PATHOLOGIST: CPT | Performed by: THORACIC SURGERY (CARDIOTHORACIC VASCULAR SURGERY)

## 2018-02-27 PROCEDURE — 88311 DECALCIFY TISSUE: CPT | Performed by: THORACIC SURGERY (CARDIOTHORACIC VASCULAR SURGERY)

## 2018-02-27 PROCEDURE — 25010000002 ALBUMIN HUMAN 25% PER 50 ML

## 2018-02-27 PROCEDURE — 33405 REPLACEMENT AORTIC VALVE OPN: CPT

## 2018-02-27 PROCEDURE — 71045 X-RAY EXAM CHEST 1 VIEW: CPT

## 2018-02-27 PROCEDURE — C1751 CATH, INF, PER/CENT/MIDLINE: HCPCS | Performed by: ANESTHESIOLOGY

## 2018-02-27 PROCEDURE — 82962 GLUCOSE BLOOD TEST: CPT

## 2018-02-27 PROCEDURE — 25010000002 METOCLOPRAMIDE PER 10 MG: Performed by: THORACIC SURGERY (CARDIOTHORACIC VASCULAR SURGERY)

## 2018-02-27 PROCEDURE — 25010000002 HEPARIN (PORCINE) PER 1000 UNITS: Performed by: ANESTHESIOLOGY

## 2018-02-27 PROCEDURE — 85384 FIBRINOGEN ACTIVITY: CPT | Performed by: THORACIC SURGERY (CARDIOTHORACIC VASCULAR SURGERY)

## 2018-02-27 PROCEDURE — 82947 ASSAY GLUCOSE BLOOD QUANT: CPT

## 2018-02-27 PROCEDURE — 80069 RENAL FUNCTION PANEL: CPT | Performed by: THORACIC SURGERY (CARDIOTHORACIC VASCULAR SURGERY)

## 2018-02-27 PROCEDURE — 85018 HEMOGLOBIN: CPT

## 2018-02-27 PROCEDURE — 25010000002 MIDAZOLAM PER 1 MG: Performed by: ANESTHESIOLOGY

## 2018-02-27 PROCEDURE — P9046 ALBUMIN (HUMAN), 25%, 20 ML: HCPCS

## 2018-02-27 PROCEDURE — 85027 COMPLETE CBC AUTOMATED: CPT | Performed by: THORACIC SURGERY (CARDIOTHORACIC VASCULAR SURGERY)

## 2018-02-27 PROCEDURE — 25010000002 ONDANSETRON PER 1 MG: Performed by: ANESTHESIOLOGY

## 2018-02-27 PROCEDURE — 82803 BLOOD GASES ANY COMBINATION: CPT

## 2018-02-27 PROCEDURE — 85610 PROTHROMBIN TIME: CPT | Performed by: THORACIC SURGERY (CARDIOTHORACIC VASCULAR SURGERY)

## 2018-02-27 PROCEDURE — 5A1221Z PERFORMANCE OF CARDIAC OUTPUT, CONTINUOUS: ICD-10-PCS | Performed by: THORACIC SURGERY (CARDIOTHORACIC VASCULAR SURGERY)

## 2018-02-27 PROCEDURE — 93318 ECHO TRANSESOPHAGEAL INTRAOP: CPT

## 2018-02-27 PROCEDURE — 83735 ASSAY OF MAGNESIUM: CPT | Performed by: THORACIC SURGERY (CARDIOTHORACIC VASCULAR SURGERY)

## 2018-02-27 PROCEDURE — 25010000002 HEPARIN (PORCINE) PER 1000 UNITS

## 2018-02-27 PROCEDURE — 25010000003 PROTAMINE SULFATE PER 10 MG: Performed by: THORACIC SURGERY (CARDIOTHORACIC VASCULAR SURGERY)

## 2018-02-27 PROCEDURE — 94002 VENT MGMT INPAT INIT DAY: CPT

## 2018-02-27 PROCEDURE — 93005 ELECTROCARDIOGRAM TRACING: CPT | Performed by: THORACIC SURGERY (CARDIOTHORACIC VASCULAR SURGERY)

## 2018-02-27 PROCEDURE — 25010000003 CEFAZOLIN IN DEXTROSE 2-4 GM/100ML-% SOLUTION: Performed by: NURSE PRACTITIONER

## 2018-02-27 PROCEDURE — 85014 HEMATOCRIT: CPT

## 2018-02-27 PROCEDURE — 25010000002 PROPOFOL 10 MG/ML EMULSION: Performed by: ANESTHESIOLOGY

## 2018-02-27 PROCEDURE — 94003 VENT MGMT INPAT SUBQ DAY: CPT

## 2018-02-27 PROCEDURE — 25010000003 CEFAZOLIN IN DEXTROSE 2-4 GM/100ML-% SOLUTION: Performed by: THORACIC SURGERY (CARDIOTHORACIC VASCULAR SURGERY)

## 2018-02-27 PROCEDURE — 82330 ASSAY OF CALCIUM: CPT | Performed by: THORACIC SURGERY (CARDIOTHORACIC VASCULAR SURGERY)

## 2018-02-27 PROCEDURE — 84132 ASSAY OF SERUM POTASSIUM: CPT | Performed by: THORACIC SURGERY (CARDIOTHORACIC VASCULAR SURGERY)

## 2018-02-27 PROCEDURE — 85347 COAGULATION TIME ACTIVATED: CPT

## 2018-02-27 PROCEDURE — 85730 THROMBOPLASTIN TIME PARTIAL: CPT | Performed by: THORACIC SURGERY (CARDIOTHORACIC VASCULAR SURGERY)

## 2018-02-27 PROCEDURE — 25010000002 VANCOMYCIN PER 500 MG

## 2018-02-27 PROCEDURE — 85025 COMPLETE CBC W/AUTO DIFF WBC: CPT | Performed by: THORACIC SURGERY (CARDIOTHORACIC VASCULAR SURGERY)

## 2018-02-27 PROCEDURE — 93010 ELECTROCARDIOGRAM REPORT: CPT | Performed by: INTERNAL MEDICINE

## 2018-02-27 DEVICE — IMPLANTABLE DEVICE: Type: IMPLANTABLE DEVICE | Site: HEART | Status: FUNCTIONAL

## 2018-02-27 RX ORDER — MAGNESIUM SULFATE HEPTAHYDRATE 40 MG/ML
4 INJECTION, SOLUTION INTRAVENOUS AS NEEDED
Status: DISCONTINUED | OUTPATIENT
Start: 2018-02-27 | End: 2018-03-03 | Stop reason: HOSPADM

## 2018-02-27 RX ORDER — ACETAMINOPHEN 650 MG/1
650 SUPPOSITORY RECTAL EVERY 4 HOURS PRN
Status: DISCONTINUED | OUTPATIENT
Start: 2018-02-27 | End: 2018-03-03 | Stop reason: HOSPADM

## 2018-02-27 RX ORDER — POTASSIUM CHLORIDE 7.45 MG/ML
10 INJECTION INTRAVENOUS
Status: DISCONTINUED | OUTPATIENT
Start: 2018-02-27 | End: 2018-03-03 | Stop reason: HOSPADM

## 2018-02-27 RX ORDER — MIDAZOLAM HYDROCHLORIDE 1 MG/ML
INJECTION INTRAMUSCULAR; INTRAVENOUS AS NEEDED
Status: DISCONTINUED | OUTPATIENT
Start: 2018-02-27 | End: 2018-02-27 | Stop reason: SURG

## 2018-02-27 RX ORDER — MEPERIDINE HYDROCHLORIDE 50 MG/ML
50 INJECTION INTRAMUSCULAR; INTRAVENOUS; SUBCUTANEOUS EVERY 4 HOURS PRN
Status: DISCONTINUED | OUTPATIENT
Start: 2018-02-27 | End: 2018-02-27

## 2018-02-27 RX ORDER — ASPIRIN 81 MG/1
81 TABLET ORAL DAILY
Status: DISCONTINUED | OUTPATIENT
Start: 2018-02-28 | End: 2018-03-03 | Stop reason: HOSPADM

## 2018-02-27 RX ORDER — FUROSEMIDE 10 MG/ML
40 INJECTION INTRAMUSCULAR; INTRAVENOUS EVERY 6 HOURS PRN
Status: DISCONTINUED | OUTPATIENT
Start: 2018-02-27 | End: 2018-02-28

## 2018-02-27 RX ORDER — MILRINONE LACTATE 0.2 MG/ML
.25-.75 INJECTION, SOLUTION INTRAVENOUS CONTINUOUS PRN
Status: DISCONTINUED | OUTPATIENT
Start: 2018-02-27 | End: 2018-03-03 | Stop reason: HOSPADM

## 2018-02-27 RX ORDER — MAGNESIUM HYDROXIDE 1200 MG/15ML
LIQUID ORAL AS NEEDED
Status: DISCONTINUED | OUTPATIENT
Start: 2018-02-27 | End: 2018-02-27 | Stop reason: HOSPADM

## 2018-02-27 RX ORDER — POTASSIUM CHLORIDE 1.5 G/1.77G
40 POWDER, FOR SOLUTION ORAL AS NEEDED
Status: DISCONTINUED | OUTPATIENT
Start: 2018-02-27 | End: 2018-03-03 | Stop reason: HOSPADM

## 2018-02-27 RX ORDER — PROPOFOL 10 MG/ML
VIAL (ML) INTRAVENOUS CONTINUOUS PRN
Status: DISCONTINUED | OUTPATIENT
Start: 2018-02-27 | End: 2018-02-27 | Stop reason: SURG

## 2018-02-27 RX ORDER — SODIUM CHLORIDE 9 MG/ML
30 INJECTION, SOLUTION INTRAVENOUS CONTINUOUS
Status: DISCONTINUED | OUTPATIENT
Start: 2018-02-27 | End: 2018-03-03 | Stop reason: HOSPADM

## 2018-02-27 RX ORDER — OXYCODONE HYDROCHLORIDE 5 MG/1
10 TABLET ORAL EVERY 4 HOURS PRN
Status: DISCONTINUED | OUTPATIENT
Start: 2018-02-27 | End: 2018-03-03 | Stop reason: HOSPADM

## 2018-02-27 RX ORDER — SODIUM CHLORIDE 9 MG/ML
INJECTION, SOLUTION INTRAVENOUS CONTINUOUS PRN
Status: DISCONTINUED | OUTPATIENT
Start: 2018-02-27 | End: 2018-02-27 | Stop reason: SURG

## 2018-02-27 RX ORDER — CHLORHEXIDINE GLUCONATE 0.12 MG/ML
15 RINSE ORAL ONCE
Status: DISCONTINUED | OUTPATIENT
Start: 2018-02-27 | End: 2018-02-27 | Stop reason: HOSPADM

## 2018-02-27 RX ORDER — PROMETHAZINE HYDROCHLORIDE 25 MG/ML
12.5 INJECTION, SOLUTION INTRAMUSCULAR; INTRAVENOUS EVERY 6 HOURS PRN
Status: DISCONTINUED | OUTPATIENT
Start: 2018-02-27 | End: 2018-03-03 | Stop reason: HOSPADM

## 2018-02-27 RX ORDER — DOPAMINE HYDROCHLORIDE 160 MG/100ML
2-20 INJECTION, SOLUTION INTRAVENOUS CONTINUOUS PRN
Status: DISCONTINUED | OUTPATIENT
Start: 2018-02-27 | End: 2018-02-28

## 2018-02-27 RX ORDER — PROMETHAZINE HYDROCHLORIDE 25 MG/1
12.5 TABLET ORAL EVERY 6 HOURS PRN
Status: DISCONTINUED | OUTPATIENT
Start: 2018-02-27 | End: 2018-03-03 | Stop reason: HOSPADM

## 2018-02-27 RX ORDER — CHLORHEXIDINE GLUCONATE 500 MG/1
1 CLOTH TOPICAL EVERY 12 HOURS PRN
Status: DISCONTINUED | OUTPATIENT
Start: 2018-02-27 | End: 2018-02-27 | Stop reason: HOSPADM

## 2018-02-27 RX ORDER — MIDAZOLAM HYDROCHLORIDE 1 MG/ML
2 INJECTION INTRAMUSCULAR; INTRAVENOUS
Status: COMPLETED | OUTPATIENT
Start: 2018-02-27 | End: 2018-02-27

## 2018-02-27 RX ORDER — POTASSIUM CHLORIDE 29.8 MG/ML
20 INJECTION INTRAVENOUS
Status: DISCONTINUED | OUTPATIENT
Start: 2018-02-27 | End: 2018-03-03 | Stop reason: HOSPADM

## 2018-02-27 RX ORDER — SODIUM CHLORIDE 0.9 % (FLUSH) 0.9 %
30 SYRINGE (ML) INJECTION ONCE AS NEEDED
Status: DISCONTINUED | OUTPATIENT
Start: 2018-02-27 | End: 2018-03-03 | Stop reason: HOSPADM

## 2018-02-27 RX ORDER — NALOXONE HCL 0.4 MG/ML
0.4 VIAL (ML) INJECTION
Status: DISCONTINUED | OUTPATIENT
Start: 2018-02-27 | End: 2018-03-03 | Stop reason: HOSPADM

## 2018-02-27 RX ORDER — BISACODYL 5 MG/1
10 TABLET, DELAYED RELEASE ORAL DAILY PRN
Status: DISCONTINUED | OUTPATIENT
Start: 2018-02-27 | End: 2018-03-03 | Stop reason: HOSPADM

## 2018-02-27 RX ORDER — CETIRIZINE HYDROCHLORIDE 10 MG/1
10 TABLET ORAL DAILY
Status: DISCONTINUED | OUTPATIENT
Start: 2018-02-28 | End: 2018-02-28

## 2018-02-27 RX ORDER — ONDANSETRON 2 MG/ML
INJECTION INTRAMUSCULAR; INTRAVENOUS AS NEEDED
Status: DISCONTINUED | OUTPATIENT
Start: 2018-02-27 | End: 2018-02-27 | Stop reason: SURG

## 2018-02-27 RX ORDER — MIDAZOLAM HYDROCHLORIDE 1 MG/ML
2 INJECTION INTRAMUSCULAR; INTRAVENOUS
Status: DISCONTINUED | OUTPATIENT
Start: 2018-02-27 | End: 2018-03-03 | Stop reason: HOSPADM

## 2018-02-27 RX ORDER — MAGNESIUM SULFATE HEPTAHYDRATE 40 MG/ML
2 INJECTION, SOLUTION INTRAVENOUS AS NEEDED
Status: DISCONTINUED | OUTPATIENT
Start: 2018-02-27 | End: 2018-03-03 | Stop reason: HOSPADM

## 2018-02-27 RX ORDER — MORPHINE SULFATE 2 MG/ML
1 INJECTION, SOLUTION INTRAMUSCULAR; INTRAVENOUS EVERY 4 HOURS PRN
Status: DISCONTINUED | OUTPATIENT
Start: 2018-02-27 | End: 2018-03-03 | Stop reason: HOSPADM

## 2018-02-27 RX ORDER — BISACODYL 10 MG
10 SUPPOSITORY, RECTAL RECTAL DAILY PRN
Status: DISCONTINUED | OUTPATIENT
Start: 2018-02-28 | End: 2018-03-03 | Stop reason: HOSPADM

## 2018-02-27 RX ORDER — PROPOFOL 10 MG/ML
VIAL (ML) INTRAVENOUS AS NEEDED
Status: DISCONTINUED | OUTPATIENT
Start: 2018-02-27 | End: 2018-02-27 | Stop reason: SURG

## 2018-02-27 RX ORDER — MAGNESIUM SULFATE 1 G/100ML
1 INJECTION INTRAVENOUS EVERY 8 HOURS
Status: DISCONTINUED | OUTPATIENT
Start: 2018-02-27 | End: 2018-02-28

## 2018-02-27 RX ORDER — SODIUM CHLORIDE 9 MG/ML
9 INJECTION, SOLUTION INTRAVENOUS CONTINUOUS
Status: DISCONTINUED | OUTPATIENT
Start: 2018-02-27 | End: 2018-02-27

## 2018-02-27 RX ORDER — HEPARIN SODIUM 1000 [USP'U]/ML
INJECTION, SOLUTION INTRAVENOUS; SUBCUTANEOUS AS NEEDED
Status: DISCONTINUED | OUTPATIENT
Start: 2018-02-27 | End: 2018-02-27 | Stop reason: SURG

## 2018-02-27 RX ORDER — PROTAMINE SULFATE 10 MG/ML
INJECTION, SOLUTION INTRAVENOUS AS NEEDED
Status: DISCONTINUED | OUTPATIENT
Start: 2018-02-27 | End: 2018-02-27 | Stop reason: HOSPADM

## 2018-02-27 RX ORDER — CEFAZOLIN SODIUM 2 G/100ML
2 INJECTION, SOLUTION INTRAVENOUS EVERY 8 HOURS
Status: COMPLETED | OUTPATIENT
Start: 2018-02-27 | End: 2018-03-01

## 2018-02-27 RX ORDER — ROCURONIUM BROMIDE 10 MG/ML
INJECTION, SOLUTION INTRAVENOUS AS NEEDED
Status: DISCONTINUED | OUTPATIENT
Start: 2018-02-27 | End: 2018-02-27 | Stop reason: SURG

## 2018-02-27 RX ORDER — CYCLOBENZAPRINE HCL 10 MG
10 TABLET ORAL EVERY 8 HOURS PRN
Status: DISCONTINUED | OUTPATIENT
Start: 2018-02-28 | End: 2018-03-03 | Stop reason: HOSPADM

## 2018-02-27 RX ORDER — SENNA AND DOCUSATE SODIUM 50; 8.6 MG/1; MG/1
2 TABLET, FILM COATED ORAL NIGHTLY
Status: DISCONTINUED | OUTPATIENT
Start: 2018-02-28 | End: 2018-03-03 | Stop reason: HOSPADM

## 2018-02-27 RX ORDER — ACETAMINOPHEN 325 MG/1
650 TABLET ORAL EVERY 4 HOURS PRN
Status: DISCONTINUED | OUTPATIENT
Start: 2018-02-27 | End: 2018-03-03 | Stop reason: HOSPADM

## 2018-02-27 RX ORDER — METOCLOPRAMIDE HYDROCHLORIDE 5 MG/ML
10 INJECTION INTRAMUSCULAR; INTRAVENOUS EVERY 6 HOURS
Status: DISCONTINUED | OUTPATIENT
Start: 2018-02-27 | End: 2018-02-28

## 2018-02-27 RX ORDER — CEFAZOLIN SODIUM 2 G/100ML
2 INJECTION, SOLUTION INTRAVENOUS ONCE
Status: COMPLETED | OUTPATIENT
Start: 2018-02-27 | End: 2018-02-27

## 2018-02-27 RX ORDER — ALPRAZOLAM 0.25 MG/1
0.25 TABLET ORAL EVERY 8 HOURS PRN
Status: DISCONTINUED | OUTPATIENT
Start: 2018-02-27 | End: 2018-03-03 | Stop reason: HOSPADM

## 2018-02-27 RX ORDER — AMIODARONE HYDROCHLORIDE 200 MG/1
400 TABLET ORAL EVERY 12 HOURS SCHEDULED
Status: DISCONTINUED | OUTPATIENT
Start: 2018-02-27 | End: 2018-03-02

## 2018-02-27 RX ORDER — ALBUMIN, HUMAN INJ 5% 5 %
1500 SOLUTION INTRAVENOUS AS NEEDED
Status: ACTIVE | OUTPATIENT
Start: 2018-02-27 | End: 2018-02-28

## 2018-02-27 RX ORDER — FAMOTIDINE 10 MG/ML
20 INJECTION, SOLUTION INTRAVENOUS ONCE
Status: DISCONTINUED | OUTPATIENT
Start: 2018-02-27 | End: 2018-03-03 | Stop reason: HOSPADM

## 2018-02-27 RX ORDER — PHENYLEPHRINE HCL IN 0.9% NACL 0.5 MG/5ML
.2-3 SYRINGE (ML) INTRAVENOUS CONTINUOUS PRN
Status: DISCONTINUED | OUTPATIENT
Start: 2018-02-27 | End: 2018-02-28

## 2018-02-27 RX ORDER — MEPERIDINE HYDROCHLORIDE 50 MG/ML
INJECTION INTRAMUSCULAR; INTRAVENOUS; SUBCUTANEOUS
Status: COMPLETED
Start: 2018-02-27 | End: 2018-02-27

## 2018-02-27 RX ORDER — MEPERIDINE HYDROCHLORIDE 50 MG/ML
25 INJECTION INTRAMUSCULAR; INTRAVENOUS; SUBCUTANEOUS EVERY 4 HOURS PRN
Status: ACTIVE | OUTPATIENT
Start: 2018-02-27 | End: 2018-03-02

## 2018-02-27 RX ORDER — ONDANSETRON 2 MG/ML
4 INJECTION INTRAMUSCULAR; INTRAVENOUS EVERY 6 HOURS PRN
Status: DISCONTINUED | OUTPATIENT
Start: 2018-02-27 | End: 2018-03-03 | Stop reason: HOSPADM

## 2018-02-27 RX ORDER — CETIRIZINE HYDROCHLORIDE 10 MG/1
10 TABLET ORAL DAILY
Status: DISCONTINUED | OUTPATIENT
Start: 2018-02-28 | End: 2018-02-27

## 2018-02-27 RX ORDER — DEXMEDETOMIDINE HYDROCHLORIDE 4 UG/ML
.2-1.5 INJECTION, SOLUTION INTRAVENOUS
Status: DISCONTINUED | OUTPATIENT
Start: 2018-02-27 | End: 2018-02-28

## 2018-02-27 RX ORDER — SODIUM CHLORIDE 9 MG/ML
30 INJECTION, SOLUTION INTRAVENOUS CONTINUOUS PRN
Status: DISCONTINUED | OUTPATIENT
Start: 2018-02-27 | End: 2018-03-03 | Stop reason: HOSPADM

## 2018-02-27 RX ORDER — NITROGLYCERIN 20 MG/100ML
5-200 INJECTION INTRAVENOUS CONTINUOUS PRN
Status: DISCONTINUED | OUTPATIENT
Start: 2018-02-27 | End: 2018-02-28

## 2018-02-27 RX ORDER — PANTOPRAZOLE SODIUM 40 MG/1
40 TABLET, DELAYED RELEASE ORAL DAILY
Status: DISCONTINUED | OUTPATIENT
Start: 2018-02-28 | End: 2018-03-03 | Stop reason: HOSPADM

## 2018-02-27 RX ORDER — SUFENTANIL CITRATE 50 UG/ML
INJECTION EPIDURAL; INTRAVENOUS AS NEEDED
Status: DISCONTINUED | OUTPATIENT
Start: 2018-02-27 | End: 2018-02-27 | Stop reason: SURG

## 2018-02-27 RX ORDER — CHLORHEXIDINE GLUCONATE 0.12 MG/ML
15 RINSE ORAL EVERY 12 HOURS
Status: DISCONTINUED | OUTPATIENT
Start: 2018-02-27 | End: 2018-03-02

## 2018-02-27 RX ORDER — HYDROCODONE BITARTRATE AND ACETAMINOPHEN 5; 325 MG/1; MG/1
2 TABLET ORAL EVERY 4 HOURS PRN
Status: DISCONTINUED | OUTPATIENT
Start: 2018-02-27 | End: 2018-03-03 | Stop reason: HOSPADM

## 2018-02-27 RX ORDER — POTASSIUM CHLORIDE 750 MG/1
40 CAPSULE, EXTENDED RELEASE ORAL AS NEEDED
Status: DISCONTINUED | OUTPATIENT
Start: 2018-02-27 | End: 2018-03-03 | Stop reason: HOSPADM

## 2018-02-27 RX ORDER — FAMOTIDINE 10 MG/ML
20 INJECTION, SOLUTION INTRAVENOUS
Status: COMPLETED | OUTPATIENT
Start: 2018-02-27 | End: 2018-02-27

## 2018-02-27 RX ORDER — TRANEXAMIC ACID 100 MG/ML
INJECTION, SOLUTION INTRAVENOUS AS NEEDED
Status: DISCONTINUED | OUTPATIENT
Start: 2018-02-27 | End: 2018-02-27 | Stop reason: SURG

## 2018-02-27 RX ADMIN — PROPOFOL 50 MCG/KG/MIN: 10 INJECTION, EMULSION INTRAVENOUS at 08:09

## 2018-02-27 RX ADMIN — ONDANSETRON 4 MG: 2 INJECTION INTRAMUSCULAR; INTRAVENOUS at 09:32

## 2018-02-27 RX ADMIN — MIDAZOLAM 2 MG: 1 INJECTION INTRAMUSCULAR; INTRAVENOUS at 05:58

## 2018-02-27 RX ADMIN — ROCURONIUM BROMIDE 50 MG: 10 INJECTION INTRAVENOUS at 06:54

## 2018-02-27 RX ADMIN — CEFAZOLIN SODIUM 2 G: 2 INJECTION, SOLUTION INTRAVENOUS at 17:18

## 2018-02-27 RX ADMIN — PHENYLEPHRINE HYDROCHLORIDE 0.5 MCG/KG/MIN: 10 INJECTION, SOLUTION INTRAMUSCULAR; INTRAVENOUS; SUBCUTANEOUS at 07:14

## 2018-02-27 RX ADMIN — HYDROCODONE BITARTRATE AND ACETAMINOPHEN 2 TABLET: 5; 325 TABLET ORAL at 23:28

## 2018-02-27 RX ADMIN — MORPHINE SULFATE 4 MG: 4 INJECTION, SOLUTION INTRAMUSCULAR; INTRAVENOUS at 12:54

## 2018-02-27 RX ADMIN — AMIODARONE HYDROCHLORIDE 400 MG: 200 TABLET ORAL at 21:35

## 2018-02-27 RX ADMIN — MUPIROCIN: 20 OINTMENT TOPICAL at 21:36

## 2018-02-27 RX ADMIN — CHLORHEXIDINE GLUCONATE 15 ML: 1.2 RINSE ORAL at 10:40

## 2018-02-27 RX ADMIN — SUFENTANIL CITRATE 50 MCG: 50 INJECTION, SOLUTION EPIDURAL; INTRAVENOUS at 09:41

## 2018-02-27 RX ADMIN — SODIUM CHLORIDE 30 ML/HR: 9 INJECTION, SOLUTION INTRAVENOUS at 10:34

## 2018-02-27 RX ADMIN — METOCLOPRAMIDE 10 MG: 5 INJECTION, SOLUTION INTRAMUSCULAR; INTRAVENOUS at 23:24

## 2018-02-27 RX ADMIN — METOPROLOL TARTRATE 12.5 MG: 25 TABLET ORAL at 21:36

## 2018-02-27 RX ADMIN — SODIUM CHLORIDE: 9 INJECTION, SOLUTION INTRAVENOUS at 06:50

## 2018-02-27 RX ADMIN — SUFENTANIL CITRATE 50 MCG: 50 INJECTION, SOLUTION EPIDURAL; INTRAVENOUS at 08:55

## 2018-02-27 RX ADMIN — METOPROLOL TARTRATE 12.5 MG: 25 TABLET ORAL at 05:54

## 2018-02-27 RX ADMIN — CEFAZOLIN SODIUM 2 G: 2 INJECTION, SOLUTION INTRAVENOUS at 07:44

## 2018-02-27 RX ADMIN — FAMOTIDINE 20 MG: 10 INJECTION, SOLUTION INTRAVENOUS at 05:58

## 2018-02-27 RX ADMIN — METOCLOPRAMIDE 10 MG: 5 INJECTION, SOLUTION INTRAMUSCULAR; INTRAVENOUS at 17:18

## 2018-02-27 RX ADMIN — MEPERIDINE HYDROCHLORIDE 12.5 MG: 50 INJECTION, SOLUTION INTRAMUSCULAR; INTRAVENOUS; SUBCUTANEOUS at 15:14

## 2018-02-27 RX ADMIN — SODIUM CHLORIDE 2.4 UNITS/HR: 9 INJECTION, SOLUTION INTRAVENOUS at 21:47

## 2018-02-27 RX ADMIN — Medication 2 MG: at 06:45

## 2018-02-27 RX ADMIN — CEFAZOLIN SODIUM 2 G: 2 INJECTION, SOLUTION INTRAVENOUS at 09:31

## 2018-02-27 RX ADMIN — Medication 2 MG: at 08:57

## 2018-02-27 RX ADMIN — SUFENTANIL CITRATE 50 MCG: 50 INJECTION, SOLUTION EPIDURAL; INTRAVENOUS at 08:25

## 2018-02-27 RX ADMIN — MAGNESIUM SULFATE HEPTAHYDRATE 1 G: 1 INJECTION, SOLUTION INTRAVENOUS at 11:35

## 2018-02-27 RX ADMIN — SUFENTANIL CITRATE 75 MCG: 50 INJECTION, SOLUTION EPIDURAL; INTRAVENOUS at 07:49

## 2018-02-27 RX ADMIN — HEPARIN SODIUM 25000 UNITS: 1000 INJECTION, SOLUTION INTRAVENOUS; SUBCUTANEOUS at 08:00

## 2018-02-27 RX ADMIN — NICARDIPINE HYDROCHLORIDE 2.5 MG/HR: 2.5 INJECTION INTRAVENOUS at 13:45

## 2018-02-27 RX ADMIN — TRANEXAMIC ACID 1000 MG: 100 INJECTION, SOLUTION INTRAVENOUS at 07:42

## 2018-02-27 RX ADMIN — PROPOFOL 30 MCG/KG/MIN: 10 INJECTION, EMULSION INTRAVENOUS at 12:40

## 2018-02-27 RX ADMIN — MORPHINE SULFATE 1 MG: 2 INJECTION, SOLUTION INTRAMUSCULAR; INTRAVENOUS at 21:29

## 2018-02-27 RX ADMIN — CHLORHEXIDINE GLUCONATE 15 ML: 1.2 RINSE ORAL at 21:37

## 2018-02-27 RX ADMIN — SUFENTANIL CITRATE 25 MCG: 50 INJECTION, SOLUTION EPIDURAL; INTRAVENOUS at 06:53

## 2018-02-27 RX ADMIN — ROCURONIUM BROMIDE 20 MG: 10 INJECTION INTRAVENOUS at 08:55

## 2018-02-27 RX ADMIN — PHENYLEPHRINE HYDROCHLORIDE 100 MCG: 10 INJECTION INTRAVENOUS at 08:10

## 2018-02-27 RX ADMIN — SODIUM CHLORIDE: 9 INJECTION, SOLUTION INTRAVENOUS at 07:30

## 2018-02-27 RX ADMIN — SODIUM CHLORIDE 9 ML: 9 INJECTION, SOLUTION INTRAVENOUS at 05:50

## 2018-02-27 RX ADMIN — OXYCODONE HYDROCHLORIDE 10 MG: 5 TABLET ORAL at 17:50

## 2018-02-27 RX ADMIN — PROPOFOL 100 MG: 10 INJECTION, EMULSION INTRAVENOUS at 06:53

## 2018-02-28 ENCOUNTER — APPOINTMENT (OUTPATIENT)
Dept: GENERAL RADIOLOGY | Facility: HOSPITAL | Age: 63
End: 2018-02-28

## 2018-02-28 LAB
ALBUMIN SERPL-MCNC: 4.3 G/DL (ref 3.5–5.2)
ANION GAP SERPL CALCULATED.3IONS-SCNC: 15.6 MMOL/L
BASE EXCESS BLDA CALC-SCNC: -2 MMOL/L (ref -5–5)
BASE EXCESS BLDA CALC-SCNC: 0 MMOL/L (ref -5–5)
BASE EXCESS BLDA CALC-SCNC: 2 MMOL/L (ref -5–5)
BASE EXCESS BLDA CALC-SCNC: 2 MMOL/L (ref -5–5)
BASE EXCESS BLDA CALC-SCNC: 4 MMOL/L (ref -5–5)
BASOPHILS # BLD AUTO: 0.01 10*3/MM3 (ref 0–0.2)
BASOPHILS NFR BLD AUTO: 0.1 % (ref 0–1.5)
BUN BLD-MCNC: 12 MG/DL (ref 8–23)
BUN/CREAT SERPL: 11.8 (ref 7–25)
CALCIUM SPEC-SCNC: 8.9 MG/DL (ref 8.6–10.5)
CHLORIDE SERPL-SCNC: 105 MMOL/L (ref 98–107)
CO2 BLDA-SCNC: 25 MMOL/L (ref 24–29)
CO2 BLDA-SCNC: 28 MMOL/L (ref 24–29)
CO2 BLDA-SCNC: 28 MMOL/L (ref 24–29)
CO2 BLDA-SCNC: 29 MMOL/L (ref 24–29)
CO2 BLDA-SCNC: 30 MMOL/L (ref 24–29)
CO2 SERPL-SCNC: 22.4 MMOL/L (ref 22–29)
CREAT BLD-MCNC: 1.02 MG/DL (ref 0.57–1)
DEPRECATED RDW RBC AUTO: 47.5 FL (ref 37–54)
EOSINOPHIL # BLD AUTO: 0 10*3/MM3 (ref 0–0.7)
EOSINOPHIL NFR BLD AUTO: 0 % (ref 0.3–6.2)
ERYTHROCYTE [DISTWIDTH] IN BLOOD BY AUTOMATED COUNT: 14.6 % (ref 11.7–13)
GFR SERPL CREATININE-BSD FRML MDRD: 55 ML/MIN/1.73
GLUCOSE BLD-MCNC: 151 MG/DL (ref 65–99)
GLUCOSE BLDC GLUCOMTR-MCNC: 105 MG/DL (ref 70–130)
GLUCOSE BLDC GLUCOMTR-MCNC: 120 MG/DL (ref 70–130)
GLUCOSE BLDC GLUCOMTR-MCNC: 122 MG/DL (ref 70–130)
GLUCOSE BLDC GLUCOMTR-MCNC: 124 MG/DL (ref 70–130)
GLUCOSE BLDC GLUCOMTR-MCNC: 129 MG/DL (ref 70–130)
GLUCOSE BLDC GLUCOMTR-MCNC: 137 MG/DL (ref 70–130)
GLUCOSE BLDC GLUCOMTR-MCNC: 142 MG/DL (ref 70–130)
GLUCOSE BLDC GLUCOMTR-MCNC: 143 MG/DL (ref 70–130)
GLUCOSE BLDC GLUCOMTR-MCNC: 144 MG/DL (ref 70–130)
GLUCOSE BLDC GLUCOMTR-MCNC: 147 MG/DL (ref 70–130)
GLUCOSE BLDC GLUCOMTR-MCNC: 154 MG/DL (ref 70–130)
HCO3 BLDA-SCNC: 23.6 MMOL/L (ref 22–26)
HCO3 BLDA-SCNC: 26.1 MMOL/L (ref 22–26)
HCO3 BLDA-SCNC: 27.1 MMOL/L (ref 22–26)
HCO3 BLDA-SCNC: 27.3 MMOL/L (ref 22–26)
HCO3 BLDA-SCNC: 28.7 MMOL/L (ref 22–26)
HCT VFR BLD AUTO: 33.2 % (ref 35.6–45.5)
HCT VFR BLDA CALC: 25 % (ref 38–51)
HCT VFR BLDA CALC: 25 % (ref 38–51)
HCT VFR BLDA CALC: 26 % (ref 38–51)
HCT VFR BLDA CALC: 26 % (ref 38–51)
HCT VFR BLDA CALC: 34 % (ref 38–51)
HGB BLD-MCNC: 10.6 G/DL (ref 11.9–15.5)
HGB BLDA-MCNC: 11.6 G/DL (ref 12–17)
HGB BLDA-MCNC: 8.5 G/DL (ref 12–17)
HGB BLDA-MCNC: 8.5 G/DL (ref 12–17)
HGB BLDA-MCNC: 8.8 G/DL (ref 12–17)
HGB BLDA-MCNC: 8.8 G/DL (ref 12–17)
IMM GRANULOCYTES # BLD: 0.03 10*3/MM3 (ref 0–0.03)
IMM GRANULOCYTES NFR BLD: 0.3 % (ref 0–0.5)
INR PPP: 1.25 (ref 0.9–1.1)
LAB AP CASE REPORT: NORMAL
LYMPHOCYTES # BLD AUTO: 0.55 10*3/MM3 (ref 0.9–4.8)
LYMPHOCYTES NFR BLD AUTO: 5.6 % (ref 19.6–45.3)
Lab: NORMAL
MAGNESIUM SERPL-MCNC: 2.1 MG/DL (ref 1.6–2.4)
MCH RBC QN AUTO: 28.3 PG (ref 26.9–32)
MCHC RBC AUTO-ENTMCNC: 31.9 G/DL (ref 32.4–36.3)
MCV RBC AUTO: 88.8 FL (ref 80.5–98.2)
MONOCYTES # BLD AUTO: 0.84 10*3/MM3 (ref 0.2–1.2)
MONOCYTES NFR BLD AUTO: 8.6 % (ref 5–12)
NEUTROPHILS # BLD AUTO: 8.37 10*3/MM3 (ref 1.9–8.1)
NEUTROPHILS NFR BLD AUTO: 85.4 % (ref 42.7–76)
PATH REPORT.FINAL DX SPEC: NORMAL
PATH REPORT.GROSS SPEC: NORMAL
PCO2 BLDA: 41.8 MM HG (ref 35–45)
PCO2 BLDA: 43.8 MM HG (ref 35–45)
PCO2 BLDA: 47.8 MM HG (ref 35–45)
PCO2 BLDA: 49.2 MM HG (ref 35–45)
PCO2 BLDA: 49.3 MM HG (ref 35–45)
PH BLDA: 7.33 PH UNITS (ref 7.35–7.6)
PH BLDA: 7.35 PH UNITS (ref 7.35–7.6)
PH BLDA: 7.36 PH UNITS (ref 7.35–7.6)
PH BLDA: 7.39 PH UNITS (ref 7.35–7.6)
PH BLDA: 7.4 PH UNITS (ref 7.35–7.6)
PHOSPHATE SERPL-MCNC: 2.8 MG/DL (ref 2.5–4.5)
PLATELET # BLD AUTO: 121 10*3/MM3 (ref 140–500)
PMV BLD AUTO: 10.6 FL (ref 6–12)
PO2 BLDA: 126 MMHG (ref 80–105)
PO2 BLDA: 227 MMHG (ref 80–105)
PO2 BLDA: 362 MMHG (ref 80–105)
PO2 BLDA: 46 MMHG (ref 80–105)
PO2 BLDA: 460 MMHG (ref 80–105)
POTASSIUM BLD-SCNC: 4.1 MMOL/L (ref 3.5–5.2)
POTASSIUM BLDA-SCNC: 4.4 MMOL/L (ref 3.5–4.9)
POTASSIUM BLDA-SCNC: 4.8 MMOL/L (ref 3.5–4.9)
POTASSIUM BLDA-SCNC: 5 MMOL/L (ref 3.5–4.9)
POTASSIUM BLDA-SCNC: 5.1 MMOL/L (ref 3.5–4.9)
POTASSIUM BLDA-SCNC: 5.6 MMOL/L (ref 3.5–4.9)
PROTHROMBIN TIME: 15.5 SECONDS (ref 11.7–14.2)
RBC # BLD AUTO: 3.74 10*6/MM3 (ref 3.9–5.2)
SAO2 % BLDA: 100 % (ref 95–98)
SAO2 % BLDA: 79 % (ref 95–98)
SAO2 % BLDA: 99 % (ref 95–98)
SODIUM BLD-SCNC: 143 MMOL/L (ref 136–145)
WBC NRBC COR # BLD: 9.8 10*3/MM3 (ref 4.5–10.7)

## 2018-02-28 PROCEDURE — 85610 PROTHROMBIN TIME: CPT | Performed by: THORACIC SURGERY (CARDIOTHORACIC VASCULAR SURGERY)

## 2018-02-28 PROCEDURE — 25010000002 FUROSEMIDE PER 20 MG: Performed by: NURSE PRACTITIONER

## 2018-02-28 PROCEDURE — 25010000002 ENOXAPARIN PER 10 MG: Performed by: THORACIC SURGERY (CARDIOTHORACIC VASCULAR SURGERY)

## 2018-02-28 PROCEDURE — 25010000003 CEFAZOLIN IN DEXTROSE 2-4 GM/100ML-% SOLUTION: Performed by: THORACIC SURGERY (CARDIOTHORACIC VASCULAR SURGERY)

## 2018-02-28 PROCEDURE — 99232 SBSQ HOSP IP/OBS MODERATE 35: CPT | Performed by: INTERNAL MEDICINE

## 2018-02-28 PROCEDURE — 71045 X-RAY EXAM CHEST 1 VIEW: CPT

## 2018-02-28 PROCEDURE — 86901 BLOOD TYPING SEROLOGIC RH(D): CPT

## 2018-02-28 PROCEDURE — 85025 COMPLETE CBC W/AUTO DIFF WBC: CPT | Performed by: THORACIC SURGERY (CARDIOTHORACIC VASCULAR SURGERY)

## 2018-02-28 PROCEDURE — 97162 PT EVAL MOD COMPLEX 30 MIN: CPT

## 2018-02-28 PROCEDURE — 25010000002 MAGNESIUM SULFATE IN D5W 1G/100ML (PREMIX) 1-5 GM/100ML-% SOLUTION: Performed by: THORACIC SURGERY (CARDIOTHORACIC VASCULAR SURGERY)

## 2018-02-28 PROCEDURE — 83735 ASSAY OF MAGNESIUM: CPT | Performed by: THORACIC SURGERY (CARDIOTHORACIC VASCULAR SURGERY)

## 2018-02-28 PROCEDURE — 93005 ELECTROCARDIOGRAM TRACING: CPT | Performed by: THORACIC SURGERY (CARDIOTHORACIC VASCULAR SURGERY)

## 2018-02-28 PROCEDURE — 82962 GLUCOSE BLOOD TEST: CPT

## 2018-02-28 PROCEDURE — 93010 ELECTROCARDIOGRAM REPORT: CPT | Performed by: INTERNAL MEDICINE

## 2018-02-28 PROCEDURE — 97110 THERAPEUTIC EXERCISES: CPT

## 2018-02-28 PROCEDURE — 86900 BLOOD TYPING SEROLOGIC ABO: CPT

## 2018-02-28 PROCEDURE — 80069 RENAL FUNCTION PANEL: CPT | Performed by: THORACIC SURGERY (CARDIOTHORACIC VASCULAR SURGERY)

## 2018-02-28 PROCEDURE — 25010000002 METOCLOPRAMIDE PER 10 MG: Performed by: THORACIC SURGERY (CARDIOTHORACIC VASCULAR SURGERY)

## 2018-02-28 RX ORDER — POTASSIUM CHLORIDE 750 MG/1
20 CAPSULE, EXTENDED RELEASE ORAL DAILY
Status: DISCONTINUED | OUTPATIENT
Start: 2018-02-28 | End: 2018-03-03 | Stop reason: HOSPADM

## 2018-02-28 RX ORDER — GUAIFENESIN 600 MG/1
1200 TABLET, EXTENDED RELEASE ORAL EVERY 12 HOURS SCHEDULED
Status: DISCONTINUED | OUTPATIENT
Start: 2018-02-28 | End: 2018-03-03 | Stop reason: HOSPADM

## 2018-02-28 RX ORDER — FUROSEMIDE 10 MG/ML
40 INJECTION INTRAMUSCULAR; INTRAVENOUS ONCE
Status: COMPLETED | OUTPATIENT
Start: 2018-02-28 | End: 2018-02-28

## 2018-02-28 RX ORDER — CETIRIZINE HYDROCHLORIDE 10 MG/1
10 TABLET ORAL ONCE
Status: COMPLETED | OUTPATIENT
Start: 2018-02-28 | End: 2018-02-28

## 2018-02-28 RX ORDER — CETIRIZINE HYDROCHLORIDE 10 MG/1
10 TABLET ORAL DAILY
Status: DISCONTINUED | OUTPATIENT
Start: 2018-02-28 | End: 2018-03-03 | Stop reason: HOSPADM

## 2018-02-28 RX ORDER — VITS A,C,E/LUTEIN/MINERALS 300MCG-200
1 TABLET ORAL DAILY
Status: DISCONTINUED | OUTPATIENT
Start: 2018-02-28 | End: 2018-03-03 | Stop reason: HOSPADM

## 2018-02-28 RX ADMIN — METOCLOPRAMIDE 10 MG: 5 INJECTION, SOLUTION INTRAMUSCULAR; INTRAVENOUS at 05:23

## 2018-02-28 RX ADMIN — CEFAZOLIN SODIUM 2 G: 2 INJECTION, SOLUTION INTRAVENOUS at 01:05

## 2018-02-28 RX ADMIN — HYDROCODONE BITARTRATE AND ACETAMINOPHEN 2 TABLET: 5; 325 TABLET ORAL at 19:33

## 2018-02-28 RX ADMIN — METOPROLOL TARTRATE 12.5 MG: 25 TABLET ORAL at 20:37

## 2018-02-28 RX ADMIN — POTASSIUM CHLORIDE 20 MEQ: 750 CAPSULE, EXTENDED RELEASE ORAL at 09:14

## 2018-02-28 RX ADMIN — AMIODARONE HYDROCHLORIDE 400 MG: 200 TABLET ORAL at 08:51

## 2018-02-28 RX ADMIN — CETIRIZINE HYDROCHLORIDE 10 MG: 10 TABLET, FILM COATED ORAL at 08:51

## 2018-02-28 RX ADMIN — CHLORHEXIDINE GLUCONATE 15 ML: 1.2 RINSE ORAL at 23:55

## 2018-02-28 RX ADMIN — HYDROCODONE BITARTRATE AND ACETAMINOPHEN 2 TABLET: 5; 325 TABLET ORAL at 10:53

## 2018-02-28 RX ADMIN — CEFAZOLIN SODIUM 2 G: 2 INJECTION, SOLUTION INTRAVENOUS at 08:51

## 2018-02-28 RX ADMIN — CEFAZOLIN SODIUM 2 G: 2 INJECTION, SOLUTION INTRAVENOUS at 18:22

## 2018-02-28 RX ADMIN — FUROSEMIDE 40 MG: 10 INJECTION, SOLUTION INTRAMUSCULAR; INTRAVENOUS at 09:14

## 2018-02-28 RX ADMIN — HYDROCODONE BITARTRATE AND ACETAMINOPHEN 2 TABLET: 5; 325 TABLET ORAL at 14:56

## 2018-02-28 RX ADMIN — CYCLOBENZAPRINE HYDROCHLORIDE 10 MG: 10 TABLET, FILM COATED ORAL at 02:30

## 2018-02-28 RX ADMIN — GUAIFENESIN 1200 MG: 600 TABLET, EXTENDED RELEASE ORAL at 12:55

## 2018-02-28 RX ADMIN — HYDROCODONE BITARTRATE AND ACETAMINOPHEN 2 TABLET: 5; 325 TABLET ORAL at 23:54

## 2018-02-28 RX ADMIN — ASPIRIN 81 MG: 81 TABLET ORAL at 08:51

## 2018-02-28 RX ADMIN — GUAIFENESIN 1200 MG: 600 TABLET, EXTENDED RELEASE ORAL at 20:37

## 2018-02-28 RX ADMIN — MUPIROCIN: 20 OINTMENT TOPICAL at 08:52

## 2018-02-28 RX ADMIN — CHLORHEXIDINE GLUCONATE 15 ML: 1.2 RINSE ORAL at 10:54

## 2018-02-28 RX ADMIN — MAGNESIUM SULFATE HEPTAHYDRATE 1 G: 1 INJECTION, SOLUTION INTRAVENOUS at 05:22

## 2018-02-28 RX ADMIN — ENOXAPARIN SODIUM 40 MG: 40 INJECTION SUBCUTANEOUS at 18:22

## 2018-02-28 RX ADMIN — MUPIROCIN: 20 OINTMENT TOPICAL at 20:37

## 2018-02-28 RX ADMIN — METOPROLOL TARTRATE 12.5 MG: 25 TABLET ORAL at 08:50

## 2018-02-28 RX ADMIN — DOCUSATE SODIUM -SENNOSIDES 2 TABLET: 50; 8.6 TABLET, COATED ORAL at 20:37

## 2018-02-28 RX ADMIN — PANTOPRAZOLE SODIUM 40 MG: 40 TABLET, DELAYED RELEASE ORAL at 08:50

## 2018-02-28 RX ADMIN — AMIODARONE HYDROCHLORIDE 400 MG: 200 TABLET ORAL at 20:37

## 2018-03-01 ENCOUNTER — APPOINTMENT (OUTPATIENT)
Dept: GENERAL RADIOLOGY | Facility: HOSPITAL | Age: 63
End: 2018-03-01

## 2018-03-01 LAB
ANION GAP SERPL CALCULATED.3IONS-SCNC: 10.3 MMOL/L
BUN BLD-MCNC: 16 MG/DL (ref 8–23)
BUN/CREAT SERPL: 16.2 (ref 7–25)
CALCIUM SPEC-SCNC: 9 MG/DL (ref 8.6–10.5)
CHLORIDE SERPL-SCNC: 99 MMOL/L (ref 98–107)
CO2 SERPL-SCNC: 26.7 MMOL/L (ref 22–29)
CREAT BLD-MCNC: 0.99 MG/DL (ref 0.57–1)
DEPRECATED RDW RBC AUTO: 48.6 FL (ref 37–54)
ERYTHROCYTE [DISTWIDTH] IN BLOOD BY AUTOMATED COUNT: 14.8 % (ref 11.7–13)
GFR SERPL CREATININE-BSD FRML MDRD: 56 ML/MIN/1.73
GLUCOSE BLD-MCNC: 127 MG/DL (ref 65–99)
GLUCOSE BLDC GLUCOMTR-MCNC: 119 MG/DL (ref 70–130)
GLUCOSE BLDC GLUCOMTR-MCNC: 123 MG/DL (ref 70–130)
HCT VFR BLD AUTO: 34.9 % (ref 35.6–45.5)
HGB BLD-MCNC: 10.9 G/DL (ref 11.9–15.5)
INR PPP: 1.31 (ref 0.9–1.1)
MCH RBC QN AUTO: 28.1 PG (ref 26.9–32)
MCHC RBC AUTO-ENTMCNC: 31.2 G/DL (ref 32.4–36.3)
MCV RBC AUTO: 89.9 FL (ref 80.5–98.2)
PLATELET # BLD AUTO: 117 10*3/MM3 (ref 140–500)
PMV BLD AUTO: 11.2 FL (ref 6–12)
POTASSIUM BLD-SCNC: 4.2 MMOL/L (ref 3.5–5.2)
PROTHROMBIN TIME: 16.1 SECONDS (ref 11.7–14.2)
RBC # BLD AUTO: 3.88 10*6/MM3 (ref 3.9–5.2)
SODIUM BLD-SCNC: 136 MMOL/L (ref 136–145)
WBC NRBC COR # BLD: 12.75 10*3/MM3 (ref 4.5–10.7)

## 2018-03-01 PROCEDURE — 82962 GLUCOSE BLOOD TEST: CPT

## 2018-03-01 PROCEDURE — 93005 ELECTROCARDIOGRAM TRACING: CPT | Performed by: THORACIC SURGERY (CARDIOTHORACIC VASCULAR SURGERY)

## 2018-03-01 PROCEDURE — 94799 UNLISTED PULMONARY SVC/PX: CPT

## 2018-03-01 PROCEDURE — 71045 X-RAY EXAM CHEST 1 VIEW: CPT

## 2018-03-01 PROCEDURE — 25010000003 CEFAZOLIN IN DEXTROSE 2-4 GM/100ML-% SOLUTION: Performed by: THORACIC SURGERY (CARDIOTHORACIC VASCULAR SURGERY)

## 2018-03-01 PROCEDURE — 93010 ELECTROCARDIOGRAM REPORT: CPT | Performed by: INTERNAL MEDICINE

## 2018-03-01 PROCEDURE — 99232 SBSQ HOSP IP/OBS MODERATE 35: CPT | Performed by: INTERNAL MEDICINE

## 2018-03-01 PROCEDURE — 25010000002 ONDANSETRON PER 1 MG: Performed by: THORACIC SURGERY (CARDIOTHORACIC VASCULAR SURGERY)

## 2018-03-01 PROCEDURE — 25010000002 ENOXAPARIN PER 10 MG: Performed by: THORACIC SURGERY (CARDIOTHORACIC VASCULAR SURGERY)

## 2018-03-01 PROCEDURE — 80048 BASIC METABOLIC PNL TOTAL CA: CPT | Performed by: THORACIC SURGERY (CARDIOTHORACIC VASCULAR SURGERY)

## 2018-03-01 PROCEDURE — 85610 PROTHROMBIN TIME: CPT | Performed by: NURSE PRACTITIONER

## 2018-03-01 PROCEDURE — 85027 COMPLETE CBC AUTOMATED: CPT | Performed by: THORACIC SURGERY (CARDIOTHORACIC VASCULAR SURGERY)

## 2018-03-01 PROCEDURE — 97110 THERAPEUTIC EXERCISES: CPT

## 2018-03-01 RX ORDER — TRAMADOL HYDROCHLORIDE 50 MG/1
50 TABLET ORAL EVERY 6 HOURS PRN
Status: DISCONTINUED | OUTPATIENT
Start: 2018-03-01 | End: 2018-03-03 | Stop reason: HOSPADM

## 2018-03-01 RX ORDER — FUROSEMIDE 40 MG/1
40 TABLET ORAL DAILY
Status: DISCONTINUED | OUTPATIENT
Start: 2018-03-01 | End: 2018-03-03 | Stop reason: HOSPADM

## 2018-03-01 RX ORDER — WARFARIN SODIUM 2 MG/1
2 TABLET ORAL
Status: COMPLETED | OUTPATIENT
Start: 2018-03-01 | End: 2018-03-01

## 2018-03-01 RX ORDER — POLYETHYLENE GLYCOL 3350 17 G/17G
17 POWDER, FOR SOLUTION ORAL DAILY
Status: DISCONTINUED | OUTPATIENT
Start: 2018-03-01 | End: 2018-03-03 | Stop reason: HOSPADM

## 2018-03-01 RX ADMIN — CEFAZOLIN SODIUM 2 G: 2 INJECTION, SOLUTION INTRAVENOUS at 00:04

## 2018-03-01 RX ADMIN — Medication 1 TABLET: at 08:03

## 2018-03-01 RX ADMIN — FUROSEMIDE 40 MG: 40 TABLET ORAL at 12:40

## 2018-03-01 RX ADMIN — CETIRIZINE HYDROCHLORIDE 10 MG: 10 TABLET, FILM COATED ORAL at 08:03

## 2018-03-01 RX ADMIN — MUPIROCIN: 20 OINTMENT TOPICAL at 08:04

## 2018-03-01 RX ADMIN — PANTOPRAZOLE SODIUM 40 MG: 40 TABLET, DELAYED RELEASE ORAL at 08:04

## 2018-03-01 RX ADMIN — WARFARIN SODIUM 2 MG: 2 TABLET ORAL at 18:51

## 2018-03-01 RX ADMIN — METOPROLOL TARTRATE 12.5 MG: 25 TABLET ORAL at 08:04

## 2018-03-01 RX ADMIN — HYDROCODONE BITARTRATE AND ACETAMINOPHEN 2 TABLET: 5; 325 TABLET ORAL at 04:05

## 2018-03-01 RX ADMIN — AMIODARONE HYDROCHLORIDE 400 MG: 200 TABLET ORAL at 08:03

## 2018-03-01 RX ADMIN — METOPROLOL TARTRATE 25 MG: 25 TABLET ORAL at 20:44

## 2018-03-01 RX ADMIN — GUAIFENESIN 1200 MG: 600 TABLET, EXTENDED RELEASE ORAL at 20:44

## 2018-03-01 RX ADMIN — ASPIRIN 81 MG: 81 TABLET ORAL at 08:03

## 2018-03-01 RX ADMIN — TRAMADOL HYDROCHLORIDE 50 MG: 50 TABLET, FILM COATED ORAL at 14:55

## 2018-03-01 RX ADMIN — METOPROLOL TARTRATE 12.5 MG: 25 TABLET ORAL at 12:40

## 2018-03-01 RX ADMIN — POLYETHYLENE GLYCOL (3350) 17 G: 17 POWDER, FOR SOLUTION ORAL at 12:40

## 2018-03-01 RX ADMIN — ENOXAPARIN SODIUM 40 MG: 40 INJECTION SUBCUTANEOUS at 18:51

## 2018-03-01 RX ADMIN — TRAMADOL HYDROCHLORIDE 50 MG: 50 TABLET, FILM COATED ORAL at 21:38

## 2018-03-01 RX ADMIN — ONDANSETRON 4 MG: 2 INJECTION INTRAMUSCULAR; INTRAVENOUS at 11:05

## 2018-03-01 RX ADMIN — DOCUSATE SODIUM -SENNOSIDES 2 TABLET: 50; 8.6 TABLET, COATED ORAL at 20:44

## 2018-03-01 RX ADMIN — GUAIFENESIN 1200 MG: 600 TABLET, EXTENDED RELEASE ORAL at 08:03

## 2018-03-01 RX ADMIN — POTASSIUM CHLORIDE 20 MEQ: 750 CAPSULE, EXTENDED RELEASE ORAL at 08:04

## 2018-03-01 RX ADMIN — CHLORHEXIDINE GLUCONATE 15 ML: 1.2 RINSE ORAL at 12:39

## 2018-03-01 RX ADMIN — AMIODARONE HYDROCHLORIDE 400 MG: 200 TABLET ORAL at 20:44

## 2018-03-01 NOTE — PROGRESS NOTES
" LOS: 2 days   Patient Care Team:  Alvaro Small MD as PCP - General (Internal Medicine)  Alvaro Small MD as PCP - Internal Medicine  Jose Bonner MD as Consulting Physician (Cardiology)  Fabián Morales MD as Consulting Physician (Cardiology)    Chief Complaint: post op AVR    Subjective:  Diet:  No nausea.    Pain:  She complains of pain that is moderate.  She reports pain is improving.  Pain is partially controlled.          Vital Signs  Temp:  [97.3 °F (36.3 °C)-98.5 °F (36.9 °C)] 97.3 °F (36.3 °C)  Heart Rate:  [72-86] 77  Resp:  [12-18] 18  BP: (118-137)/(61-66) 124/61  Body mass index is 32.08 kg/(m^2).    Intake/Output Summary (Last 24 hours) at 03/01/18 0914  Last data filed at 03/01/18 0900   Gross per 24 hour   Intake              460 ml   Output             2050 ml   Net            -1590 ml     I/O this shift:  In: 360 [P.O.:360]  Out: 135 [Urine:125; Other:10]    Chest tube drainage last 8 hours 40    Last 3 weights    02/27/18  0532 03/01/18  0653   Weight: 82.6 kg (182 lb) 87.5 kg (192 lb 12.8 oz)         Objective:  General Appearance:  In no acute distress.    Vital signs: (most recent): Blood pressure 124/61, pulse 77, temperature 97.3 °F (36.3 °C), temperature source Oral, resp. rate 18, height 165.1 cm (65\"), weight 87.5 kg (192 lb 12.8 oz), SpO2 94 %.  Vital signs are normal.    Output: Producing urine and no stool output.    HEENT: Normal HEENT exam.    Lungs:  Normal effort.  There are decreased breath sounds.    Heart: Normal rate.  Regular rhythm.  S1 normal and S2 normal.    Abdomen: Hypoactive bowel sounds.   There is no abdominal tenderness.     Extremities: Normal range of motion.    Pulses: Distal pulses are intact.    Neurological: Patient is alert and oriented to person, place and time.  Normal strength.    Pupils:  Pupils are equal, round, and reactive to light.    Skin:  Warm, dry and pale.              Results Review:        WBC WBC   Date Value Ref Range Status "   03/01/2018 12.75 (H) 4.50 - 10.70 10*3/mm3 Final   02/28/2018 9.80 4.50 - 10.70 10*3/mm3 Final   02/27/2018 7.56 4.50 - 10.70 10*3/mm3 Final   02/27/2018 7.40 4.50 - 10.70 10*3/mm3 Final      HGB Hemoglobin   Date Value Ref Range Status   03/01/2018 10.9 (L) 11.9 - 15.5 g/dL Final   02/28/2018 10.6 (L) 11.9 - 15.5 g/dL Final   02/27/2018 10.6 (L) 11.9 - 15.5 g/dL Final   02/27/2018 9.1 (L) 11.9 - 15.5 g/dL Final   02/27/2018 8.5 (L) 12.0 - 17.0 g/dL Final   02/27/2018 8.8 (L) 12.0 - 17.0 g/dL Final   02/27/2018 8.8 (L) 12.0 - 17.0 g/dL Final   02/27/2018 8.5 (L) 12.0 - 17.0 g/dL Final   02/27/2018 11.6 (L) 12.0 - 17.0 g/dL Final      HCT Hematocrit   Date Value Ref Range Status   03/01/2018 34.9 (L) 35.6 - 45.5 % Final   02/28/2018 33.2 (L) 35.6 - 45.5 % Final   02/27/2018 32.0 (L) 35.6 - 45.5 % Final   02/27/2018 27.6 (L) 35.6 - 45.5 % Final   02/27/2018 25 (L) 38 - 51 % Final   02/27/2018 26 (L) 38 - 51 % Final   02/27/2018 26 (L) 38 - 51 % Final   02/27/2018 25 (L) 38 - 51 % Final   02/27/2018 34 (L) 38 - 51 % Final      Platelets Platelets   Date Value Ref Range Status   03/01/2018 117 (L) 140 - 500 10*3/mm3 Final   02/28/2018 121 (L) 140 - 500 10*3/mm3 Final   02/27/2018 119 (L) 140 - 500 10*3/mm3 Final   02/27/2018 111 (L) 140 - 500 10*3/mm3 Final        PT/INR:    Protime   Date Value Ref Range Status   02/28/2018 15.5 (H) 11.7 - 14.2 Seconds Final   02/27/2018 17.8 (H) 11.7 - 14.2 Seconds Final   /  INR   Date Value Ref Range Status   02/28/2018 1.25 (H) 0.90 - 1.10 Final   02/27/2018 1.50 (H) 0.90 - 1.10 Final       Sodium Sodium   Date Value Ref Range Status   03/01/2018 136 136 - 145 mmol/L Final   02/28/2018 143 136 - 145 mmol/L Final   02/27/2018 142 136 - 145 mmol/L Final   02/27/2018 139 136 - 145 mmol/L Final      Potassium Potassium   Date Value Ref Range Status   03/01/2018 4.2 3.5 - 5.2 mmol/L Final   02/28/2018 4.1 3.5 - 5.2 mmol/L Final   02/27/2018 4.3 3.5 - 5.2 mmol/L Final   02/27/2018 4.3  3.5 - 5.2 mmol/L Final   02/27/2018 4.6 3.5 - 5.2 mmol/L Final      Chloride Chloride   Date Value Ref Range Status   03/01/2018 99 98 - 107 mmol/L Final   02/28/2018 105 98 - 107 mmol/L Final   02/27/2018 105 98 - 107 mmol/L Final   02/27/2018 104 98 - 107 mmol/L Final      Bicarbonate CO2   Date Value Ref Range Status   03/01/2018 26.7 22.0 - 29.0 mmol/L Final   02/28/2018 22.4 22.0 - 29.0 mmol/L Final   02/27/2018 22.9 22.0 - 29.0 mmol/L Final   02/27/2018 22.1 22.0 - 29.0 mmol/L Final      BUN BUN   Date Value Ref Range Status   03/01/2018 16 8 - 23 mg/dL Final   02/28/2018 12 8 - 23 mg/dL Final   02/27/2018 13 8 - 23 mg/dL Final   02/27/2018 14 8 - 23 mg/dL Final      Creatinine Creatinine   Date Value Ref Range Status   03/01/2018 0.99 0.57 - 1.00 mg/dL Final   02/28/2018 1.02 (H) 0.57 - 1.00 mg/dL Final   02/27/2018 1.18 (H) 0.57 - 1.00 mg/dL Final   02/27/2018 1.17 (H) 0.57 - 1.00 mg/dL Final      Calcium Calcium   Date Value Ref Range Status   03/01/2018 9.0 8.6 - 10.5 mg/dL Final   02/28/2018 8.9 8.6 - 10.5 mg/dL Final   02/27/2018 8.7 8.6 - 10.5 mg/dL Final   02/27/2018 8.7 8.6 - 10.5 mg/dL Final      Magnesium Magnesium   Date Value Ref Range Status   02/28/2018 2.1 1.6 - 2.4 mg/dL Final   02/27/2018 2.6 (H) 1.6 - 2.4 mg/dL Final   02/27/2018 2.3 1.6 - 2.4 mg/dL Final            amiodarone 400 mg Oral Q12H   aspirin 81 mg Oral Daily   cetirizine 10 mg Oral Daily   chlorhexidine 15 mL Mouth/Throat Q12H   enoxaparin 40 mg Subcutaneous Daily   famotidine 20 mg Intravenous Once   guaiFENesin 1,200 mg Oral Q12H   metoprolol tartrate 12.5 mg Oral Q12H   mupirocin  Each Nare BID   OCUVITE-LUTEIN 1 tablet Oral Daily   pantoprazole 40 mg Oral Daily   potassium chloride 20 mEq Oral Daily   sennosides-docusate sodium 2 tablet Oral Nightly       insulin regular infusion 1 unit/mL (CCU use) 0-50 Units/hr Last Rate: Stopped (02/28/18 0630)   milrinone 0.25-0.75 mcg/kg/min    sodium chloride 30 mL/hr Last Rate: 30  mL/hr (02/27/18 1034)   sodium chloride 30 mL/hr    vasopressin 0.02-0.1 Units/min            Patient Active Problem List   Diagnosis Code   • Psoriasis L40.9   • Paget's bone disease M88.9   • Perennial allergic rhinitis J30.89   • PAT (paroxysmal atrial tachycardia) I47.1   • Migraine without aura and without status migrainosus, not intractable G43.009   • IGT (impaired glucose tolerance) R73.02   • Osteopenia M85.80   • Vitamin D deficiency E55.9   • Nonrheumatic aortic valve stenosis I35.0   • Aortic stenosis I35.0   • Aortic valve stenosis I35.0       Assessment & Plan    -Severe aortic stenosis- s/p mini-sternotomy AVR- porcine -POD#2 [place]  -Paroxysmal atrial tachycardia- s/p ablation   -Psoriasis    CXR- atelectasis, left effusion improved edema  2L NC  EKG- SR rate 75 frequent PAC  Diminish bilat  Loose cough        Encourage IS. Mobilize. Increase beta blocker.  Wean O2 as tolerated.   switch to PO lasix.           STEPHANIE Murray  03/01/18  9:14 AM

## 2018-03-01 NOTE — PROGRESS NOTES
Discharge Planning Assessment  [facility]     Patient Name: Chastity Berrios  MRN: [id number]  Today's Date: 3/1/2018    Admit Date: 2/27/2018          Discharge Needs Assessment       03/01/18 1235    Living Environment    Lives With spouse    Living Arrangements house    Home Accessibility stairs within home    Number of Stairs to Enter Home 5    Stair Railings at Home inside, present at both sides    Type of Financial/Environmental Concern none    Transportation Available family or friend will provide;car    Living Environment    Quality Of Family Relationships supportive;helpful    Able to Return to Prior Living Arrangements yes    Discharge Needs Assessment    Concerns To Be Addressed adjustment to diagnosis/illness concerns    Outpatient/Agency/Support Group Needs homecare agency (specify level of care)    Anticipated Changes Related to Illness none    Equipment Currently Used at Home none    Equipment Needed After Discharge none    Discharge Facility/Level Of Care Needs home with home health    Discharge Disposition still a patient    Discharge Contact Information if Applicable Edilson Berrios [phone]            Discharge Plan       03/01/18 1237    Case Management/Social Work Plan    Plan Home with [facility]    Patient/Family In Agreement With Plan yes    Additional Comments Spoke with Pt and spouse Edilson ([phone]) at bedside.  CCP introduced self and role.  Pt confirmed information on face sheet.  Pt stated she is [employer], retired & drives.  Pt lives in a house with spouse that has 5 steps to enter family room and 12 steps to basement.  Pt denies past home health, subacute rehab and DME.  Pt confirms pharmacy as [facility] on [address].  Pt denies issues with affording her medications.  Pt plans to return home at discharge with 24-hour assistance of her spouse.  Pt has chosen [facility] to follow her upon discharge.  Referral given to itz Cabral.  CCP will continue to follow…………….RADAMES SMITH/CCP         Discharge Placement     Facility/Agency Request Status Selected? Address Phone Number Fax Number    [facility] Accepted    Yes [address] [address] [phone]                Demographic Summary       03/01/18 1234    Referral Information    Admission Type inpatient    Arrived From admitted as an inpatient    Referral Source physician    Reason For Consult discharge planning    Record Reviewed medical record    Contact Information    Permission Granted to Share Information With family/designee    Primary Care Physician Information    Name Alvaro Small MD            Functional Status       03/01/18 1234    Functional Status Current    Ambulation 2-->assistive person    Transferring 2-->assistive person    Toileting 2-->assistive person    Bathing 2-->assistive person    Dressing 2-->assistive person    Eating 0-->independent    Communication 0-->understands/communicates without difficulty    Swallowing (if score 2 or more for any item, consult Rehab Services) 0-->swallows foods/liquids without difficulty    Change in Functional Status Since Onset of Current Illness/Injury yes   Expected post op weakness    Functional Status Prior    Ambulation 0-->independent    Transferring 0-->independent    Toileting 0-->independent    Bathing 0-->independent    Dressing 0-->independent    Eating 0-->independent    Communication 0-->understands/communicates without difficulty    Swallowing 0-->swallows foods/liquids without difficulty    Cognitive/Perceptual/Developmental    Current Mental Status/Cognitive Functioning no deficits noted            Psychosocial     None            Abuse/Neglect     None            Legal       03/01/18 1235    Legal    Legal Comments no poa            Substance Abuse     None            Patient Forms     None          Yolanda Kate RN

## 2018-03-01 NOTE — PLAN OF CARE
Problem: Patient Care Overview (Adult)  Goal: Plan of Care Review  Outcome: Ongoing (interventions implemented as appropriate)   03/01/18 0994   Coping/Psychosocial Response Interventions   Plan Of Care Reviewed With patient   Patient Care Overview   Progress progress toward functional goals as expected   Outcome Evaluation   Outcome Summary/Follow up Plan patient able to tolerate increased ambulation distance, still very groggy, cues to keep eyes open even while upright and walking

## 2018-03-01 NOTE — PLAN OF CARE
Problem: Patient Care Overview (Adult)  Goal: Plan of Care Review  Outcome: Ongoing (interventions implemented as appropriate)   03/01/18 0332   Outcome Evaluation   Outcome Summary/Follow up Plan VSS. PM AAI with a rate of 50. On 2L O2 via NC. [place] with CYU. CT with SS output. Dressing to chest intact. PRN pain meds given with good effect. Will continue to monitor.      Goal: Adult Individualization and Mutuality  Outcome: Ongoing (interventions implemented as appropriate)    Goal: Discharge Needs Assessment  Outcome: Ongoing (interventions implemented as appropriate)      Problem: Cardiac Surgery (Adult)  Goal: Signs and Symptoms of Listed Potential Problems Will be Absent or Manageable (Cardiac Surgery)  Outcome: Ongoing (interventions implemented as appropriate)      Problem: Chest Tube Drainage Device (Adult)  Goal: Signs and Symptoms of Listed Potential Problems Will be Absent or Manageable (Chest Tube Drainage Device)  Outcome: Ongoing (interventions implemented as appropriate)

## 2018-03-01 NOTE — THERAPY TREATMENT NOTE
"Acute Care - Physical Therapy Treatment Note  [facility]     Patient Name: Chastity Berrios  : 1955  MRN: [id number]  Today's Date: 3/1/2018  Onset of Illness/Injury or Date of Surgery Date: 18          Admit Date: 2018    Visit Dx:    ICD-10-CM ICD-9-CM   1. Generalized weakness R53.1 780.79   2. Aortic valve stenosis, etiology of cardiac valve disease unspecified I35.0 424.1     Patient Active Problem List   Diagnosis   • Psoriasis   • Paget's bone disease   • Perennial allergic rhinitis   • PAT (paroxysmal atrial tachycardia)   • Migraine without aura and without status migrainosus, not intractable   • IGT (impaired glucose tolerance)   • Osteopenia   • Vitamin D deficiency   • Nonrheumatic aortic valve stenosis   • Aortic stenosis   • Aortic valve stenosis               Adult Rehabilitation Note       18 0937          Rehab Assessment/Intervention    Discipline physical therapist  -EM      Document Type therapy note (daily note)  -EM      Subjective Information agree to therapy   states she feels very \"loopy\"   -EM      Patient Effort, Rehab Treatment good  -EM      Precautions/Limitations cardiac precautions  -EM      Recorded by [EM] Ashley Caba, PT      Vital Signs    Pre Systolic BP Rehab 124  -EM      Pre Treatment Diastolic BP 69  -EM      Pretreatment Heart Rate (beats/min) 77  -EM      Posttreatment Heart Rate (beats/min) 79  -EM      Pre SpO2 (%) 95  -EM      O2 Delivery Pre Treatment supplemental O2  -EM      Post SpO2 (%) 95  -EM      O2 Delivery Post Treatment supplemental O2  -EM      Recorded by [EM] Ashley Caba, PT      Pain Assessment    Pain Assessment No/denies pain  -EM      Recorded by [EM] Ashley Caba, PT      Bed Mobility, Assessment/Treatment    Bed Mobility, Assistive Device head of bed elevated  -EM      Bed Mob, Sit to Supine, [place] minimum assist (75% patient effort);2 person assist required  -EM      Recorded by [EM] Ashley SANTOS" Rosales, PT      Transfer Assessment/Treatment    Transfers, Sit-Stand [place] contact guard assist  -EM      Transfers, Stand-Sit [place] contact guard assist  -EM      Recorded by [EM] Ashley Caba, PT      Gait Assessment/Treatment    Gait, [place] Level contact guard assist;2 person assist required  -EM      Gait, Distance (Feet) 100  -EM      Gait, Gait Deviations gallo decreased  -EM      Gait, Comment cues to keep eyes open, one episode of knees buckling but able to self correct, pt seems very groggy even during ambulation   -EM      Recorded by [EM] Ashley Caba, PT      Therapy Exercises    Exercise Protocols --   5 reps cardiac protocol, level 3  -EM      Recorded by [EM] Ashley Caba, PT      Positioning and Restraints    Pre-Treatment Position sitting in chair/recliner  -EM      Post Treatment Position bed  -EM      In Bed supine;call light within reach  -EM      Recorded by [EM] Ashley Caba, PT        User Key  (r) = Recorded By, (t) = Taken By, (c) = Cosigned By    Initials Name Effective Dates    EM Ashley Caba PT 12/01/15 -                 IP PT Goals       02/28/18 0920          Cardiopulmonary PT LTG    Cardiopulmonary PT LTG, Date Established 02/28/18  -EM      Cardiopulmonary PT LTG, Time to Achieve 1 wk  -EM      Cardiopulmonary PT LTG, Level Level V  -EM        User Key  (r) = Recorded By, (t) = Taken By, (c) = Cosigned By    Initials Name Provider Type    EM Ashley Caba, PT Physical Therapist          Physical Therapy Education     Title: PT OT SLP Therapies (Active)     Topic: Physical Therapy (Active)     Point: Mobility training (Active)    Learning Progress Summary    Learner Readiness Method Response Comment Documented by Status   Patient Acceptance E NR  EM 03/01/18 0939 Active    Acceptance E NR  EM 02/28/18 0919 Active               Point: Home exercise program (Active)    Learning Progress Summary    Learner Readiness  Method Response Comment Documented by Status   Patient Acceptance E NR  EM 03/01/18 0939 Active    Acceptance E NR  EM 02/28/18 0919 Active                      User Key     Initials Effective Dates Name Provider Type [facility] 12/01/15 -  Ashley Caba PT Physical Therapist PT                    PT Recommendation and Plan  Anticipated Discharge Disposition: home with assist  Planned Therapy Interventions: bed mobility training, gait training, home exercise program  PT Frequency: daily  Plan of Care Review  Plan Of Care Reviewed With: patient  Progress: progress toward functional goals as expected  Outcome Summary/Follow up Plan: patient able to tolerate increased ambulation distance, still very groggy, cues to keep eyes open even while upright and walking           Outcome Measures       03/01/18 0900 02/28/18 0900       How much help from another person do you currently need...    Turning from your back to your side while in flat bed without using bedrails? 3  -EM 3  -EM     Moving from lying on back to sitting on the side of a flat bed without bedrails? 3  -EM 3  -EM     Moving to and from a bed to a chair (including a wheelchair)? 3  -EM 3  -EM     Standing up from a chair using your arms (e.g., wheelchair, bedside chair)? 3  -EM 3  -EM     Climbing 3-5 steps with a railing? 3  -EM 2  -EM     To walk in hospital room? 3  -EM 3  -EM     AM-PAC 6 Clicks Score 18  -EM 17  -EM     Functional Assessment    Outcome Measure Options  AM-PAC 6 Clicks Basic Mobility (PT)  -EM       User Key  (r) = Recorded By, (t) = Taken By, (c) = Cosigned By    Initials Name Provider Type     Ashley Caba, PT Physical Therapist           Time Calculation:         PT Charges       03/01/18 0940          Time Calculation    Start Time 0919  -EM      Stop Time 0934  -EM      Time Calculation (min) 15 min  -EM      PT Received On 03/01/18  -EM      PT - Next Appointment 03/02/18  -EM        User Key  (r) = Recorded  By, (t) = Taken By, (c) = Cosigned By    Initials Name Provider Type    EM Ashley Caba, PT Physical Therapist          Therapy Charges for Today     Code Description Service Date Service Provider Modifiers Qty    58285424943 HC PT EVAL MOD COMPLEXITY 2 2/28/2018 Ashley Caba, PT GP 1    86460327093 HC PT THER PROC EA 15 MIN 2/28/2018 Ashley Caba, PT GP 1    59424679783 HC PT THER SUPP EA 15 MIN 2/28/2018 Ashley Caba, PT GP 1    29398531946 HC PT THER PROC EA 15 MIN 3/1/2018 Ashley Caba, PT GP 1    80856113817 HC PT THER SUPP EA 15 MIN 3/1/2018 Ashley Caba, PT GP 1          PT G-Codes  Outcome Measure Options: AM-PAC 6 Clicks Basic Mobility (PT)    Ashley Caba, PT  3/1/2018

## 2018-03-01 NOTE — PROGRESS NOTES
Hospital Follow Up    LOS:  LOS: 2 days   Patient Name: Chastity Berrios  Age/Sex: 62 y.o. female  : 1955  MRN: [id number]    Day of Service: 18   Length of Stay: 2  Encounter Provider: Jose Bonner MD  Place of Service: [facility] CARDIOLOGY  Patient Care Team:  Alvaro Small MD as PCP - General (Internal Medicine)  Alvaro Small MD as PCP - Internal Medicine  Jose Bonner MD as Consulting Physician (Cardiology)  Fabián Morales MD as Consulting Physician (Cardiology)    Subjective:     Chief Complaint: Aortic valve replacement    Interval History: Continues to do well no complaints    Objective:     Objective:  Temp:  [97.3 °F (36.3 °C)-98.5 °F (36.9 °C)] 97.3 °F (36.3 °C)  Heart Rate:  [72-86] 77  Resp:  [12-18] 18  BP: (118-137)/(61-66) 124/61     Intake/Output Summary (Last 24 hours) at 18 1124  Last data filed at 18 0900   Gross per 24 hour   Intake              460 ml   Output              930 ml   Net             -470 ml     Body mass index is 32.08 kg/(m^2).  Last 3 weights    18  0532 18  0653   Weight: 82.6 kg (182 lb) 87.5 kg (192 lb 12.8 oz)     Weight change:       Physical Exam:   General : Alert, cooperative, in no acute distress.  Neuro: alert,cooperative and oriented  Lungs: CTAB. Normal respiratory effort and rate.  CV:: Regular rate and rhythm, normal S1 and S2, no murmurs, gallops or rubs.  ABD: Soft, nontender, non-distended. positive bowel sounds  Extr: No edema or cyanosis, moves all extremities    Lab Review:     Results from last 7 days  Lab Units 18  0323 18  0316  18  0835   SODIUM mmol/L 136 143  < > 137   POTASSIUM mmol/L 4.2 4.1  < > 3.9   CHLORIDE mmol/L 99 105  < > 99   CO2 mmol/L 26.7 22.4  < > 28.5   BUN mg/dL 16 12  < > 16   CREATININE mg/dL 0.99 1.02*  < > 1.11*   GLUCOSE mg/dL 127* 151*  < > 117*   CALCIUM mg/dL 9.0 8.9  < > 9.4   AST (SGOT) U/L  --   --   --  24   ALT  (SGPT) U/L  --   --   --  35*   < > = values in this interval not displayed.        Results from last 7 days  Lab Units 03/01/18  0323 02/28/18  0316   WBC 10*3/mm3 12.75* 9.80   HEMOGLOBIN g/dL 10.9* 10.6*   HEMATOCRIT % 34.9* 33.2*   PLATELETS 10*3/mm3 117* 121*       Results from last 7 days  Lab Units 02/28/18  0316 02/27/18  1022 02/26/18  0835   INR  1.25* 1.50* 1.01   APTT seconds  --  31.3 25.8       Results from last 7 days  Lab Units 02/28/18  0316 02/27/18  1411   MAGNESIUM mg/dL 2.1 2.6*       Results from last 7 days  Lab Units 02/26/18  0835   CHOLESTEROL mg/dL 198   TRIGLYCERIDES mg/dL 123   HDL CHOL mg/dL 48       Results from last 7 days  Lab Units 02/26/18  0835   PROBNP pg/mL 92.5       Current Medications:   Scheduled Meds:  amiodarone 400 mg Oral Q12H   aspirin 81 mg Oral Daily   cetirizine 10 mg Oral Daily   chlorhexidine 15 mL Mouth/Throat Q12H   enoxaparin 40 mg Subcutaneous Daily   famotidine 20 mg Intravenous Once   furosemide 40 mg Oral Daily   guaiFENesin 1,200 mg Oral Q12H   metoprolol tartrate 12.5 mg Oral Once   metoprolol tartrate 25 mg Oral Q12H   mupirocin  Each Nare BID   OCUVITE-LUTEIN 1 tablet Oral Daily   pantoprazole 40 mg Oral Daily   polyethylene glycol 17 g Oral Daily   potassium chloride 20 mEq Oral Daily   sennosides-docusate sodium 2 tablet Oral Nightly     Continuous Infusions:  insulin regular infusion 1 unit/mL (CCU use) 0-50 Units/hr Last Rate: Stopped (02/28/18 0630)   milrinone 0.25-0.75 mcg/kg/min    sodium chloride 30 mL/hr Last Rate: 30 mL/hr (02/27/18 1034)   sodium chloride 30 mL/hr    vasopressin 0.02-0.1 Units/min        Allergies:  No Known Allergies    Assessment:     Principal Problem:    Aortic valve stenosis        Plan:       1.  Aortic valve stenosis.  Status post aortic valve replacement with a bioprosthetic aortic valve.  2.  History of paroxysmal atrial tachycardia status post ablation will follow for rhythms.  3.  Psoriasis  4.  Vital signs stable  into the supportive care will follow.  Encouraged ambulation and incentive parameter    Jose Bonner MD  03/01/18  11:24 AM

## 2018-03-02 ENCOUNTER — APPOINTMENT (OUTPATIENT)
Dept: GENERAL RADIOLOGY | Facility: HOSPITAL | Age: 63
End: 2018-03-02

## 2018-03-02 LAB
ANION GAP SERPL CALCULATED.3IONS-SCNC: 12 MMOL/L
BUN BLD-MCNC: 17 MG/DL (ref 8–23)
BUN/CREAT SERPL: 21.5 (ref 7–25)
CALCIUM SPEC-SCNC: 8.8 MG/DL (ref 8.6–10.5)
CHLORIDE SERPL-SCNC: 96 MMOL/L (ref 98–107)
CO2 SERPL-SCNC: 29 MMOL/L (ref 22–29)
CREAT BLD-MCNC: 0.79 MG/DL (ref 0.57–1)
DEPRECATED RDW RBC AUTO: 46.4 FL (ref 37–54)
ERYTHROCYTE [DISTWIDTH] IN BLOOD BY AUTOMATED COUNT: 14.4 % (ref 11.7–13)
GFR SERPL CREATININE-BSD FRML MDRD: 73 ML/MIN/1.73
GLUCOSE BLD-MCNC: 125 MG/DL (ref 65–99)
HCT VFR BLD AUTO: 33.2 % (ref 35.6–45.5)
HGB BLD-MCNC: 10.6 G/DL (ref 11.9–15.5)
INR PPP: 1.25 (ref 0.9–1.1)
MCH RBC QN AUTO: 28 PG (ref 26.9–32)
MCHC RBC AUTO-ENTMCNC: 31.9 G/DL (ref 32.4–36.3)
MCV RBC AUTO: 87.6 FL (ref 80.5–98.2)
PLATELET # BLD AUTO: 127 10*3/MM3 (ref 140–500)
PMV BLD AUTO: 11.8 FL (ref 6–12)
POTASSIUM BLD-SCNC: 4 MMOL/L (ref 3.5–5.2)
PROTHROMBIN TIME: 15.5 SECONDS (ref 11.7–14.2)
RBC # BLD AUTO: 3.79 10*6/MM3 (ref 3.9–5.2)
SODIUM BLD-SCNC: 137 MMOL/L (ref 136–145)
WBC NRBC COR # BLD: 10.99 10*3/MM3 (ref 4.5–10.7)

## 2018-03-02 PROCEDURE — 85610 PROTHROMBIN TIME: CPT | Performed by: NURSE PRACTITIONER

## 2018-03-02 PROCEDURE — 97110 THERAPEUTIC EXERCISES: CPT

## 2018-03-02 PROCEDURE — 85027 COMPLETE CBC AUTOMATED: CPT | Performed by: THORACIC SURGERY (CARDIOTHORACIC VASCULAR SURGERY)

## 2018-03-02 PROCEDURE — 94762 N-INVAS EAR/PLS OXIMTRY CONT: CPT

## 2018-03-02 PROCEDURE — 71046 X-RAY EXAM CHEST 2 VIEWS: CPT

## 2018-03-02 PROCEDURE — 80048 BASIC METABOLIC PNL TOTAL CA: CPT | Performed by: THORACIC SURGERY (CARDIOTHORACIC VASCULAR SURGERY)

## 2018-03-02 PROCEDURE — 99232 SBSQ HOSP IP/OBS MODERATE 35: CPT | Performed by: INTERNAL MEDICINE

## 2018-03-02 PROCEDURE — 25010000002 ONDANSETRON PER 1 MG: Performed by: THORACIC SURGERY (CARDIOTHORACIC VASCULAR SURGERY)

## 2018-03-02 PROCEDURE — 25010000002 ENOXAPARIN PER 10 MG: Performed by: THORACIC SURGERY (CARDIOTHORACIC VASCULAR SURGERY)

## 2018-03-02 RX ORDER — AMIODARONE HYDROCHLORIDE 200 MG/1
400 TABLET ORAL
Status: DISCONTINUED | OUTPATIENT
Start: 2018-03-03 | End: 2018-03-03

## 2018-03-02 RX ORDER — WARFARIN SODIUM 3 MG/1
3 TABLET ORAL
Status: COMPLETED | OUTPATIENT
Start: 2018-03-02 | End: 2018-03-02

## 2018-03-02 RX ORDER — WARFARIN SODIUM 2 MG/1
2 TABLET ORAL
Status: DISCONTINUED | OUTPATIENT
Start: 2018-03-02 | End: 2018-03-02

## 2018-03-02 RX ADMIN — WARFARIN SODIUM 3 MG: 3 TABLET ORAL at 18:15

## 2018-03-02 RX ADMIN — POLYETHYLENE GLYCOL (3350) 17 G: 17 POWDER, FOR SOLUTION ORAL at 09:03

## 2018-03-02 RX ADMIN — CETIRIZINE HYDROCHLORIDE 10 MG: 10 TABLET, FILM COATED ORAL at 09:03

## 2018-03-02 RX ADMIN — ONDANSETRON 4 MG: 2 INJECTION INTRAMUSCULAR; INTRAVENOUS at 12:22

## 2018-03-02 RX ADMIN — ENOXAPARIN SODIUM 40 MG: 40 INJECTION SUBCUTANEOUS at 18:15

## 2018-03-02 RX ADMIN — DOCUSATE SODIUM -SENNOSIDES 2 TABLET: 50; 8.6 TABLET, COATED ORAL at 21:04

## 2018-03-02 RX ADMIN — METOPROLOL TARTRATE 37.5 MG: 25 TABLET ORAL at 21:07

## 2018-03-02 RX ADMIN — POTASSIUM CHLORIDE 20 MEQ: 750 CAPSULE, EXTENDED RELEASE ORAL at 09:03

## 2018-03-02 RX ADMIN — GUAIFENESIN 1200 MG: 600 TABLET, EXTENDED RELEASE ORAL at 09:03

## 2018-03-02 RX ADMIN — FUROSEMIDE 40 MG: 40 TABLET ORAL at 09:03

## 2018-03-02 RX ADMIN — PANTOPRAZOLE SODIUM 40 MG: 40 TABLET, DELAYED RELEASE ORAL at 09:03

## 2018-03-02 RX ADMIN — AMIODARONE HYDROCHLORIDE 400 MG: 200 TABLET ORAL at 09:04

## 2018-03-02 RX ADMIN — GUAIFENESIN 1200 MG: 600 TABLET, EXTENDED RELEASE ORAL at 21:04

## 2018-03-02 RX ADMIN — TRAMADOL HYDROCHLORIDE 50 MG: 50 TABLET, FILM COATED ORAL at 04:23

## 2018-03-02 RX ADMIN — ASPIRIN 81 MG: 81 TABLET ORAL at 09:04

## 2018-03-02 RX ADMIN — METOPROLOL TARTRATE 12.5 MG: 25 TABLET ORAL at 10:43

## 2018-03-02 RX ADMIN — METOPROLOL TARTRATE 25 MG: 25 TABLET ORAL at 09:03

## 2018-03-02 RX ADMIN — Medication 1 TABLET: at 09:04

## 2018-03-02 NOTE — PROGRESS NOTES
Hospital Follow Up    LOS:  LOS: 3 days   Patient Name: Chastity Berrios  Age/Sex: 62 y.o. female  : 1955  MRN: [id number]    Day of Service: 18   Length of Stay: 3  Encounter Provider: Jose Bonner MD  Place of Service: [facility] CARDIOLOGY  Patient Care Team:  Alvaro Small MD as PCP - General (Internal Medicine)  Alvaro Small MD as PCP - Internal Medicine  Jose Bonner MD as Consulting Physician (Cardiology)  Fabián Morales MD as Consulting Physician (Cardiology)    Subjective:     Chief Complaint: Aortic valve replacement.    Interval History: Chest tube was removed last night.  Clinically patient is starting to improve.  Blood pressure heart rate have remained stable.    Objective:     Objective:  Temp:  [97.7 °F (36.5 °C)-98.7 °F (37.1 °C)] 98 °F (36.7 °C)  Heart Rate:  [66-95] 74  Resp:  [16-18] 18  BP: (116-141)/(63-80) 123/69     Intake/Output Summary (Last 24 hours) at 18 1157  Last data filed at 18 0423   Gross per 24 hour   Intake             1160 ml   Output              860 ml   Net              300 ml     Body mass index is 30.22 kg/(m^2).  Last 3 weights    18  0532 18  0653 18  0500   Weight: 82.6 kg (182 lb) 87.5 kg (192 lb 12.8 oz) 82.4 kg (181 lb 9.6 oz)     Weight change: -5.08 kg (-11 lb 3.2 oz)      Physical Exam:   General : Alert, cooperative, in no acute distress.  Neuro: alert,cooperative and oriented  Lungs: CTAB. Normal respiratory effort and rate.  CV:: Regular rate and rhythm, normal S1 and S2, no murmurs, gallops or rubs.  ABD: Soft, nontender, non-distended. positive bowel sounds  Extr: No edema or cyanosis, moves all extremities    Lab Review:     Results from last 7 days  Lab Units 18  0907 18  0323  18  0835   SODIUM mmol/L 137 136  < > 137   POTASSIUM mmol/L 4.0 4.2  < > 3.9   CHLORIDE mmol/L 96* 99  < > 99   CO2 mmol/L 29.0 26.7  < > 28.5   BUN mg/dL 17 16  < > 16    CREATININE mg/dL 0.79 0.99  < > 1.11*   GLUCOSE mg/dL 125* 127*  < > 117*   CALCIUM mg/dL 8.8 9.0  < > 9.4   AST (SGOT) U/L  --   --   --  24   ALT (SGPT) U/L  --   --   --  35*   < > = values in this interval not displayed.        Results from last 7 days  Lab Units 03/02/18  0354 03/01/18  0323   WBC 10*3/mm3 10.99* 12.75*   HEMOGLOBIN g/dL 10.6* 10.9*   HEMATOCRIT % 33.2* 34.9*   PLATELETS 10*3/mm3 127* 117*       Results from last 7 days  Lab Units 03/02/18  0354 03/01/18  1417  02/27/18  1022 02/26/18  0835   INR  1.25* 1.31*  < > 1.50* 1.01   APTT seconds  --   --   --  31.3 25.8   < > = values in this interval not displayed.    Results from last 7 days  Lab Units 02/28/18  0316 02/27/18  1411   MAGNESIUM mg/dL 2.1 2.6*       Results from last 7 days  Lab Units 02/26/18  0835   CHOLESTEROL mg/dL 198   TRIGLYCERIDES mg/dL 123   HDL CHOL mg/dL 48       Results from last 7 days  Lab Units 02/26/18  0835   PROBNP pg/mL 92.5       Current Medications:   Scheduled Meds:  amiodarone 400 mg Oral Q12H   aspirin 81 mg Oral Daily   cetirizine 10 mg Oral Daily   enoxaparin 40 mg Subcutaneous Daily   famotidine 20 mg Intravenous Once   furosemide 40 mg Oral Daily   guaiFENesin 1,200 mg Oral Q12H   metoprolol tartrate 37.5 mg Oral Q12H   mupirocin  Each Nare BID   OCUVITE-LUTEIN 1 tablet Oral Daily   pantoprazole 40 mg Oral Daily   polyethylene glycol 17 g Oral Daily   potassium chloride 20 mEq Oral Daily   sennosides-docusate sodium 2 tablet Oral Nightly   warfarin 2 mg Oral Once     Continuous Infusions:  insulin regular infusion 1 unit/mL (CCU use) 0-50 Units/hr Last Rate: Stopped (02/28/18 0630)   milrinone 0.25-0.75 mcg/kg/min    sodium chloride 30 mL/hr Last Rate: 30 mL/hr (02/27/18 1034)   sodium chloride 30 mL/hr    vasopressin 0.02-0.1 Units/min        Allergies:  No Known Allergies    Assessment:     Principal Problem:    Aortic valve stenosis        Plan:     1.  Status post AVR.  Stable no current issues  normal postop course.  2.  History of paroxysmal atrial tachycardia.  Status post ablation patient's on amiodarone.  9 last 36 hours.  I'm going to decrease amiodarone down to 400 today.  Jose Bonner MD  03/02/18  11:57 AM

## 2018-03-02 NOTE — PLAN OF CARE
"Problem: Patient Care Overview (Adult)  Goal: Plan of Care Review  Outcome: Ongoing (interventions implemented as appropriate)   03/02/18 0935   Coping/Psychosocial Response Interventions   Plan Of Care Reviewed With patient   Patient Care Overview   Progress progress toward functional goals as expected   Outcome Evaluation   Outcome Summary/Follow up Plan patient much more alert today, no c/o feeling \"foggy\", much steadier with ambulation. ready for Level 5 tomorrow, anticipate d/c home soon          "

## 2018-03-02 NOTE — THERAPY TREATMENT NOTE
"Acute Care - Physical Therapy Treatment Note  [facility]     Patient Name: Chastity Berrios  : 1955  MRN: [id number]  Today's Date: 3/2/2018  Onset of Illness/Injury or Date of Surgery Date: 18          Admit Date: 2018    Visit Dx:    ICD-10-CM ICD-9-CM   1. Generalized weakness R53.1 780.79   2. Aortic valve stenosis, etiology of cardiac valve disease unspecified I35.0 424.1     Patient Active Problem List   Diagnosis   • Psoriasis   • Paget's bone disease   • Perennial allergic rhinitis   • PAT (paroxysmal atrial tachycardia)   • Migraine without aura and without status migrainosus, not intractable   • IGT (impaired glucose tolerance)   • Osteopenia   • Vitamin D deficiency   • Nonrheumatic aortic valve stenosis   • Aortic stenosis   • Aortic valve stenosis               Adult Rehabilitation Note       18 0931 18 0937       Rehab Assessment/Intervention    Discipline physical therapist  -EM physical therapist  -EM     Document Type therapy note (daily note)  -EM therapy note (daily note)  -EM     Subjective Information agree to therapy;no complaints   states she feels much better today   -EM agree to therapy   states she feels very \"loopy\"   -EM     Patient Effort, Rehab Treatment good  -EM good  -EM     Precautions/Limitations cardiac precautions  -EM cardiac precautions  -EM     Recorded by [EM] Ashley Caba PT [EM] Ashley Caba PT     Vital Signs    Pre Systolic BP Rehab  124  -EM     Pre Treatment Diastolic BP  69  -EM     Pretreatment Heart Rate (beats/min)  77  -EM     Posttreatment Heart Rate (beats/min)  79  -EM     Pre SpO2 (%)  95  -EM     O2 Delivery Pre Treatment  supplemental O2  -EM     Post SpO2 (%)  95  -EM     O2 Delivery Post Treatment  supplemental O2  -EM     Recorded by  [EM] Ashley Caba PT     Pain Assessment    Pain Assessment No/denies pain  -EM No/denies pain  -EM     Recorded by [EM] Ashley Caba PT [EM] Ashley SANTOS" Rosales, PT     Cognitive Assessment/Intervention    Current Cognitive/Communication Assessment functional  -EM      Recorded by [EM] Ashley Caba PT      Bed Mobility, Assessment/Treatment    Bed Mobility, Assistive Device head of bed elevated  -EM head of bed elevated  -EM     Bed Mob, Sit to Supine, [place] supervision required  -EM minimum assist (75% patient effort);2 person assist required  -EM     Recorded by [EM] Ashley Caba, PT [EM] Ashley Caba, PT     Transfer Assessment/Treatment    Transfers, Sit-Stand [place] stand by assist  -EM contact guard assist  -EM     Transfers, Stand-Sit [place] stand by assist  -EM contact guard assist  -EM     Recorded by [EM] Ashley Caba, PT [EM] Ashley Caba, PT     Gait Assessment/Treatment    Gait, [place] Level contact guard assist  -EM contact guard assist;2 person assist required  -EM     Gait, Distance (Feet) 200  -  -EM     Gait, Gait Deviations  gallo decreased  -EM     Gait, Comment steadier with ambulation, much more alert today   -EM cues to keep eyes open, one episode of knees buckling but able to self correct, pt seems very groggy even during ambulation   -EM     Recorded by [EM] Ashley Caba, PT [EM] Ashley Caba, PT     Therapy Exercises    Exercise Protocols --   5 reps cardiac protocol, level 4  -EM --   5 reps cardiac protocol, level 3  -EM     Recorded by [EM] Ashley Caba, PT [EM] Ashley Caba, PT     Positioning and Restraints    Pre-Treatment Position sitting in chair/recliner  -EM sitting in chair/recliner  -EM     Post Treatment Position bed  -EM bed  -EM     In Bed supine;call light within reach  -EM supine;call light within reach  -EM     Recorded by [EM] Ashley Caba, PT [EM] Ashley Caba, PT       User Key  (r) = Recorded By, (t) = Taken By, (c) = Cosigned By    Initials Name Effective Dates    EM Ashley Caba, PT 12/01/15 -      "            IP PT Goals       02/28/18 0920          Cardiopulmonary PT LTG    Cardiopulmonary PT LTG, Date Established 02/28/18  -EM      Cardiopulmonary PT LTG, Time to Achieve 1 wk  -EM      Cardiopulmonary PT LTG, Level Level V  -EM        User Key  (r) = Recorded By, (t) = Taken By, (c) = Cosigned By    Initials Name Provider Type    OLEGARIO Caba, PT Physical Therapist          Physical Therapy Education     Title: PT OT SLP Therapies (Active)     Topic: Physical Therapy (Active)     Point: Mobility training (Done)    Learning Progress Summary    Learner Readiness Method Response Comment Documented by Status   Patient Acceptance E VU  EM 03/02/18 0935 Done    Acceptance E NR  EM 03/01/18 0939 Active    Acceptance E NR  EM 02/28/18 0919 Active               Point: Home exercise program (Done)    Learning Progress Summary    Learner Readiness Method Response Comment Documented by Status   Patient Acceptance E VU  EM 03/02/18 0935 Done    Acceptance E NR  EM 03/01/18 0939 Active    Acceptance E NR  EM 02/28/18 0919 Active                      User Key     Initials Effective Dates Name Provider Type [facility] 12/01/15 -  Ashley Caba, PT Physical Therapist PT                    PT Recommendation and Plan  Anticipated Discharge Disposition: home with assist  Planned Therapy Interventions: bed mobility training, gait training, home exercise program  PT Frequency: daily  Plan of Care Review  Plan Of Care Reviewed With: patient  Progress: progress toward functional goals as expected  Outcome Summary/Follow up Plan: patient much more alert today, no c/o feeling \"foggy\", much steadier with ambulation. ready for Level 5 tomorrow, anticipate d/c home soon           Outcome Measures       03/02/18 0900 03/01/18 0900 02/28/18 0900    How much help from another person do you currently need...    Turning from your back to your side while in flat bed without using bedrails? 3  -EM 3  -EM 3  -EM    Moving " from lying on back to sitting on the side of a flat bed without bedrails? 3  -EM 3  -EM 3  -EM    Moving to and from a bed to a chair (including a wheelchair)? 3  -EM 3  -EM 3  -EM    Standing up from a chair using your arms (e.g., wheelchair, bedside chair)? 3  -EM 3  -EM 3  -EM    Climbing 3-5 steps with a railing? 3  -EM 3  -EM 2  -EM    To walk in hospital room? 3  -EM 3  -EM 3  -EM    AM-PAC 6 Clicks Score 18  -EM 18  -EM 17  -EM    Functional Assessment    Outcome Measure Options   AM-PAC 6 Clicks Basic Mobility (PT)  -EM      User Key  (r) = Recorded By, (t) = Taken By, (c) = Cosigned By    Initials Name Provider Type    OLEGARIO Caba PT Physical Therapist           Time Calculation:         PT Charges       03/02/18 0936          Time Calculation    Start Time 0915  -EM      Stop Time 0925  -EM      Time Calculation (min) 10 min  -EM      PT Received On 03/02/18  -EM      PT - Next Appointment 03/03/18  -EM        User Key  (r) = Recorded By, (t) = Taken By, (c) = Cosigned By    Initials Name Provider Type    EM Ashley Caba PT Physical Therapist          Therapy Charges for Today     Code Description Service Date Service Provider Modifiers Qty    00880878701 HC PT THER PROC EA 15 MIN 3/1/2018 Ashley Caba, PT GP 1    85048448453 HC PT THER SUPP EA 15 MIN 3/1/2018 Ashley Caba, PT GP 1    59601404615 HC PT THER PROC EA 15 MIN 3/2/2018 Ashley Caba, PT GP 1          PT G-Codes  Outcome Measure Options: AM-PAC 6 Clicks Basic Mobility (PT)    Ashley Caba PT  3/2/2018

## 2018-03-02 NOTE — PROGRESS NOTES
" LOS: 3 days   Patient Care Team:  Alvaro Small MD as PCP - General (Internal Medicine)  Alvaro Small MD as PCP - Internal Medicine  Jose Bonner MD as Consulting Physician (Cardiology)  Fabián Morales MD as Consulting Physician (Cardiology)    Chief Complaint: post op  Subjective:  Symptoms:  She reports weakness.  No shortness of breath.    Diet:  No nausea.    Activity level: Returning to normal.    Pain:  She complains of pain that is mild.  Pain is well controlled.          Vital Signs  Temp:  [97.7 °F (36.5 °C)-98.7 °F (37.1 °C)] 98 °F (36.7 °C)  Heart Rate:  [74-95] 95  Resp:  [16-18] 18  BP: (116-141)/(63-69) 128/67  Body mass index is 30.22 kg/(m^2).    Intake/Output Summary (Last 24 hours) at 03/02/18 0908  Last data filed at 03/02/18 0423   Gross per 24 hour   Intake             1160 ml   Output              860 ml   Net              300 ml              Last 3 weights    02/27/18  0532 03/01/18  0653 03/02/18  0500   Weight: 82.6 kg (182 lb) 87.5 kg (192 lb 12.8 oz) 82.4 kg (181 lb 9.6 oz)         Objective:  General Appearance:  In no acute distress.    Vital signs: (most recent): Blood pressure 128/67, pulse 95, temperature 98 °F (36.7 °C), temperature source Oral, resp. rate 18, height 165.1 cm (65\"), weight 82.4 kg (181 lb 9.6 oz), SpO2 92 %.  Vital signs are normal.    Output: Producing urine and no stool output.    HEENT: Normal HEENT exam.    Lungs:  Normal respiratory rate and normal effort.  Breath sounds clear to auscultation.    Heart: Normal rate.  Regular rhythm.  S1 normal and S2 normal.    Abdomen: Abdomen is soft.  Bowel sounds are normal.     Extremities: Normal range of motion.    Pulses: Distal pulses are intact.    Neurological: Patient is alert and oriented to person, place and time.  Normal strength.    Pupils:  Pupils are equal, round, and reactive to light.              Results Review:        WBC WBC   Date Value Ref Range Status   03/02/2018 10.99 (H) 4.50 - 10.70 " 10*3/mm3 Final   03/01/2018 12.75 (H) 4.50 - 10.70 10*3/mm3 Final   02/28/2018 9.80 4.50 - 10.70 10*3/mm3 Final   02/27/2018 7.56 4.50 - 10.70 10*3/mm3 Final   02/27/2018 7.40 4.50 - 10.70 10*3/mm3 Final      HGB Hemoglobin   Date Value Ref Range Status   03/02/2018 10.6 (L) 11.9 - 15.5 g/dL Final   03/01/2018 10.9 (L) 11.9 - 15.5 g/dL Final   02/28/2018 10.6 (L) 11.9 - 15.5 g/dL Final   02/27/2018 10.6 (L) 11.9 - 15.5 g/dL Final   02/27/2018 9.1 (L) 11.9 - 15.5 g/dL Final   02/27/2018 8.5 (L) 12.0 - 17.0 g/dL Final      HCT Hematocrit   Date Value Ref Range Status   03/02/2018 33.2 (L) 35.6 - 45.5 % Final   03/01/2018 34.9 (L) 35.6 - 45.5 % Final   02/28/2018 33.2 (L) 35.6 - 45.5 % Final   02/27/2018 32.0 (L) 35.6 - 45.5 % Final   02/27/2018 27.6 (L) 35.6 - 45.5 % Final   02/27/2018 25 (L) 38 - 51 % Final      Platelets Platelets   Date Value Ref Range Status   03/02/2018 127 (L) 140 - 500 10*3/mm3 Final   03/01/2018 117 (L) 140 - 500 10*3/mm3 Final   02/28/2018 121 (L) 140 - 500 10*3/mm3 Final   02/27/2018 119 (L) 140 - 500 10*3/mm3 Final   02/27/2018 111 (L) 140 - 500 10*3/mm3 Final        PT/INR:    Protime   Date Value Ref Range Status   03/02/2018 15.5 (H) 11.7 - 14.2 Seconds Final   03/01/2018 16.1 (H) 11.7 - 14.2 Seconds Final   02/28/2018 15.5 (H) 11.7 - 14.2 Seconds Final   02/27/2018 17.8 (H) 11.7 - 14.2 Seconds Final   /  INR   Date Value Ref Range Status   03/02/2018 1.25 (H) 0.90 - 1.10 Final   03/01/2018 1.31 (H) 0.90 - 1.10 Final   02/28/2018 1.25 (H) 0.90 - 1.10 Final   02/27/2018 1.50 (H) 0.90 - 1.10 Final       Sodium Sodium   Date Value Ref Range Status   03/01/2018 136 136 - 145 mmol/L Final   02/28/2018 143 136 - 145 mmol/L Final   02/27/2018 142 136 - 145 mmol/L Final   02/27/2018 139 136 - 145 mmol/L Final      Potassium Potassium   Date Value Ref Range Status   03/01/2018 4.2 3.5 - 5.2 mmol/L Final   02/28/2018 4.1 3.5 - 5.2 mmol/L Final   02/27/2018 4.3 3.5 - 5.2 mmol/L Final   02/27/2018  4.3 3.5 - 5.2 mmol/L Final   02/27/2018 4.6 3.5 - 5.2 mmol/L Final      Chloride Chloride   Date Value Ref Range Status   03/01/2018 99 98 - 107 mmol/L Final   02/28/2018 105 98 - 107 mmol/L Final   02/27/2018 105 98 - 107 mmol/L Final   02/27/2018 104 98 - 107 mmol/L Final      Bicarbonate CO2   Date Value Ref Range Status   03/01/2018 26.7 22.0 - 29.0 mmol/L Final   02/28/2018 22.4 22.0 - 29.0 mmol/L Final   02/27/2018 22.9 22.0 - 29.0 mmol/L Final   02/27/2018 22.1 22.0 - 29.0 mmol/L Final      BUN BUN   Date Value Ref Range Status   03/01/2018 16 8 - 23 mg/dL Final   02/28/2018 12 8 - 23 mg/dL Final   02/27/2018 13 8 - 23 mg/dL Final   02/27/2018 14 8 - 23 mg/dL Final      Creatinine Creatinine   Date Value Ref Range Status   03/01/2018 0.99 0.57 - 1.00 mg/dL Final   02/28/2018 1.02 (H) 0.57 - 1.00 mg/dL Final   02/27/2018 1.18 (H) 0.57 - 1.00 mg/dL Final   02/27/2018 1.17 (H) 0.57 - 1.00 mg/dL Final      Calcium Calcium   Date Value Ref Range Status   03/01/2018 9.0 8.6 - 10.5 mg/dL Final   02/28/2018 8.9 8.6 - 10.5 mg/dL Final   02/27/2018 8.7 8.6 - 10.5 mg/dL Final   02/27/2018 8.7 8.6 - 10.5 mg/dL Final      Magnesium Magnesium   Date Value Ref Range Status   02/28/2018 2.1 1.6 - 2.4 mg/dL Final   02/27/2018 2.6 (H) 1.6 - 2.4 mg/dL Final   02/27/2018 2.3 1.6 - 2.4 mg/dL Final            amiodarone 400 mg Oral Q12H   aspirin 81 mg Oral Daily   cetirizine 10 mg Oral Daily   chlorhexidine 15 mL Mouth/Throat Q12H   enoxaparin 40 mg Subcutaneous Daily   famotidine 20 mg Intravenous Once   furosemide 40 mg Oral Daily   guaiFENesin 1,200 mg Oral Q12H   metoprolol tartrate 25 mg Oral Q12H   mupirocin  Each Nare BID   OCUVITE-LUTEIN 1 tablet Oral Daily   pantoprazole 40 mg Oral Daily   polyethylene glycol 17 g Oral Daily   potassium chloride 20 mEq Oral Daily   sennosides-docusate sodium 2 tablet Oral Nightly   warfarin 2 mg Oral Once       insulin regular infusion 1 unit/mL (CCU use) 0-50 Units/hr Last Rate: Stopped  (02/28/18 0630)   milrinone 0.25-0.75 mcg/kg/min    sodium chloride 30 mL/hr Last Rate: 30 mL/hr (02/27/18 1034)   sodium chloride 30 mL/hr    vasopressin 0.02-0.1 Units/min            Patient Active Problem List   Diagnosis Code   • Psoriasis L40.9   • Paget's bone disease M88.9   • Perennial allergic rhinitis J30.89   • PAT (paroxysmal atrial tachycardia) I47.1   • Migraine without aura and without status migrainosus, not intractable G43.009   • IGT (impaired glucose tolerance) R73.02   • Osteopenia M85.80   • Vitamin D deficiency E55.9   • Nonrheumatic aortic valve stenosis I35.0   • Aortic stenosis I35.0   • Aortic valve stenosis I35.0       Assessment & Plan       -Severe aortic stenosis- s/p mini-sternotomy AVR- porcine -POD#3 [place]  -Paroxysmal atrial tachycardia- s/p ablation   -Psoriasis     EKG- SR rate 75 frequent PAC      Encourage IS. Mobilize. Increase beta blocker.  Wean O2 as tolerated. Started low dose coumadin, IVR 1.25; 2mg today.  Discontinue AV wires. Overnight oximetry tonight anticipate home with home health tomorrow.         STEPHANIE Murray  03/02/18  9:08 AM

## 2018-03-02 NOTE — PLAN OF CARE
Problem: Patient Care Overview (Adult)  Goal: Plan of Care Review  Outcome: Ongoing (interventions implemented as appropriate)   03/02/18 0529   Coping/Psychosocial Response Interventions   Plan Of Care Reviewed With patient   Patient Care Overview   Progress improving   Outcome Evaluation   Outcome Summary/Follow up Plan Vitals stable. No falls. Pt c/o pain in head and midsternal incisoon, medicated per MAR. Wires remain I/T. Pt placed on 1L with sleep for decreased O2 sats. Pt resting comfortably. Monitoring closely.      Goal: Adult Individualization and Mutuality  Outcome: Ongoing (interventions implemented as appropriate)    Goal: Discharge Needs Assessment  Outcome: Ongoing (interventions implemented as appropriate)      Problem: Cardiac Surgery (Adult)  Goal: Signs and Symptoms of Listed Potential Problems Will be Absent or Manageable (Cardiac Surgery)  Outcome: Ongoing (interventions implemented as appropriate)   03/02/18 0529   Cardiac Surgery   Problems Assessed (Cardiac Surgery) all   Problems Present (Cardiac Surgery) pain;hypoxia/hypoxemia;situational response       Problem: Chest Tube Drainage Device (Adult)  Goal: Signs and Symptoms of Listed Potential Problems Will be Absent or Manageable (Chest Tube Drainage Device)  Outcome: Ongoing (interventions implemented as appropriate)   03/02/18 0529   Chest Tube Drainage Device   Problems Assessed (Chest Tube Drainage Device) all   Problems Present (Chest Tube Drainage Device) pain       Problem: Pain, Acute (Adult)  Goal: Identify Related Risk Factors and Signs and Symptoms  Outcome: Outcome(s) achieved Date Met: 03/02/18 03/02/18 0529   Pain, Acute   Related Risk Factors (Acute Pain) surgery;positioning   Signs and Symptoms (Acute Pain) verbalization of pain descriptors     Goal: Acceptable Pain Control/Comfort Level  Outcome: Ongoing (interventions implemented as appropriate)   03/02/18 0529   Pain, Acute (Adult)   Acceptable Pain Control/Comfort Level  making progress toward outcome

## 2018-03-03 VITALS
SYSTOLIC BLOOD PRESSURE: 116 MMHG | WEIGHT: 178.8 LBS | HEART RATE: 80 BPM | HEIGHT: 65 IN | DIASTOLIC BLOOD PRESSURE: 68 MMHG | RESPIRATION RATE: 16 BRPM | OXYGEN SATURATION: 94 % | TEMPERATURE: 97.6 F | BODY MASS INDEX: 29.79 KG/M2

## 2018-03-03 LAB
ANION GAP SERPL CALCULATED.3IONS-SCNC: 12 MMOL/L
BUN BLD-MCNC: 16 MG/DL (ref 8–23)
BUN/CREAT SERPL: 17 (ref 7–25)
CALCIUM SPEC-SCNC: 9.2 MG/DL (ref 8.6–10.5)
CHLORIDE SERPL-SCNC: 96 MMOL/L (ref 98–107)
CO2 SERPL-SCNC: 32 MMOL/L (ref 22–29)
CREAT BLD-MCNC: 0.94 MG/DL (ref 0.57–1)
DEPRECATED RDW RBC AUTO: 45.4 FL (ref 37–54)
ERYTHROCYTE [DISTWIDTH] IN BLOOD BY AUTOMATED COUNT: 14.1 % (ref 11.7–13)
GFR SERPL CREATININE-BSD FRML MDRD: 60 ML/MIN/1.73
GLUCOSE BLD-MCNC: 107 MG/DL (ref 65–99)
HCT VFR BLD AUTO: 36.9 % (ref 35.6–45.5)
HGB BLD-MCNC: 11.9 G/DL (ref 11.9–15.5)
INR PPP: 1.69 (ref 0.9–1.1)
MCH RBC QN AUTO: 28.5 PG (ref 26.9–32)
MCHC RBC AUTO-ENTMCNC: 32.2 G/DL (ref 32.4–36.3)
MCV RBC AUTO: 88.5 FL (ref 80.5–98.2)
PLATELET # BLD AUTO: 185 10*3/MM3 (ref 140–500)
PMV BLD AUTO: 10.4 FL (ref 6–12)
POTASSIUM BLD-SCNC: 4 MMOL/L (ref 3.5–5.2)
PROTHROMBIN TIME: 19.6 SECONDS (ref 11.7–14.2)
RBC # BLD AUTO: 4.17 10*6/MM3 (ref 3.9–5.2)
SODIUM BLD-SCNC: 140 MMOL/L (ref 136–145)
WBC NRBC COR # BLD: 7.69 10*3/MM3 (ref 4.5–10.7)

## 2018-03-03 PROCEDURE — 85610 PROTHROMBIN TIME: CPT | Performed by: NURSE PRACTITIONER

## 2018-03-03 PROCEDURE — 94799 UNLISTED PULMONARY SVC/PX: CPT | Performed by: NURSE PRACTITIONER

## 2018-03-03 PROCEDURE — 93005 ELECTROCARDIOGRAM TRACING: CPT | Performed by: INTERNAL MEDICINE

## 2018-03-03 PROCEDURE — 99232 SBSQ HOSP IP/OBS MODERATE 35: CPT | Performed by: NURSE PRACTITIONER

## 2018-03-03 PROCEDURE — 85027 COMPLETE CBC AUTOMATED: CPT | Performed by: THORACIC SURGERY (CARDIOTHORACIC VASCULAR SURGERY)

## 2018-03-03 PROCEDURE — 97110 THERAPEUTIC EXERCISES: CPT

## 2018-03-03 PROCEDURE — 93010 ELECTROCARDIOGRAM REPORT: CPT | Performed by: INTERNAL MEDICINE

## 2018-03-03 PROCEDURE — 80048 BASIC METABOLIC PNL TOTAL CA: CPT | Performed by: THORACIC SURGERY (CARDIOTHORACIC VASCULAR SURGERY)

## 2018-03-03 PROCEDURE — 99024 POSTOP FOLLOW-UP VISIT: CPT | Performed by: THORACIC SURGERY (CARDIOTHORACIC VASCULAR SURGERY)

## 2018-03-03 RX ORDER — AMIODARONE HYDROCHLORIDE 200 MG/1
200 TABLET ORAL
Status: DISCONTINUED | OUTPATIENT
Start: 2018-03-04 | End: 2018-03-03 | Stop reason: HOSPADM

## 2018-03-03 RX ORDER — HYDROCODONE BITARTRATE AND ACETAMINOPHEN 5; 325 MG/1; MG/1
1 TABLET ORAL EVERY 4 HOURS PRN
Qty: 60 TABLET | Refills: 0 | Status: SHIPPED | OUTPATIENT
Start: 2018-03-03 | End: 2018-03-09

## 2018-03-03 RX ORDER — ASPIRIN 81 MG/1
81 TABLET ORAL DAILY
Qty: 60 TABLET | Refills: 1 | Status: SHIPPED | OUTPATIENT
Start: 2018-03-04

## 2018-03-03 RX ORDER — WARFARIN SODIUM 2 MG/1
2 TABLET ORAL
Status: DISCONTINUED | OUTPATIENT
Start: 2018-03-03 | End: 2018-03-03 | Stop reason: HOSPADM

## 2018-03-03 RX ORDER — POTASSIUM CHLORIDE 750 MG/1
20 CAPSULE, EXTENDED RELEASE ORAL DAILY
Qty: 7 CAPSULE | Refills: 0 | Status: SHIPPED | OUTPATIENT
Start: 2018-03-04 | End: 2018-03-28 | Stop reason: ALTCHOICE

## 2018-03-03 RX ORDER — WARFARIN SODIUM 1 MG/1
2 TABLET ORAL
Qty: 30 TABLET | Refills: 3 | Status: SHIPPED | OUTPATIENT
Start: 2018-03-03 | End: 2018-04-10

## 2018-03-03 RX ORDER — FUROSEMIDE 40 MG/1
40 TABLET ORAL DAILY
Qty: 7 TABLET | Refills: 1 | Status: SHIPPED | OUTPATIENT
Start: 2018-03-04 | End: 2018-03-28 | Stop reason: ALTCHOICE

## 2018-03-03 RX ORDER — METOPROLOL TARTRATE 37.5 MG/1
37.5 TABLET, FILM COATED ORAL EVERY 12 HOURS SCHEDULED
Qty: 60 TABLET | Refills: 0 | Status: SHIPPED | OUTPATIENT
Start: 2018-03-03 | End: 2018-04-10

## 2018-03-03 RX ORDER — AMIODARONE HYDROCHLORIDE 200 MG/1
200 TABLET ORAL
Qty: 60 TABLET | Refills: 0 | Status: SHIPPED | OUTPATIENT
Start: 2018-03-04 | End: 2018-04-10

## 2018-03-03 RX ADMIN — METOPROLOL TARTRATE 37.5 MG: 25 TABLET ORAL at 09:11

## 2018-03-03 RX ADMIN — FUROSEMIDE 40 MG: 40 TABLET ORAL at 09:11

## 2018-03-03 RX ADMIN — CETIRIZINE HYDROCHLORIDE 10 MG: 10 TABLET, FILM COATED ORAL at 09:11

## 2018-03-03 RX ADMIN — POTASSIUM CHLORIDE 20 MEQ: 750 CAPSULE, EXTENDED RELEASE ORAL at 09:11

## 2018-03-03 RX ADMIN — Medication 1 TABLET: at 09:11

## 2018-03-03 RX ADMIN — PANTOPRAZOLE SODIUM 40 MG: 40 TABLET, DELAYED RELEASE ORAL at 09:11

## 2018-03-03 RX ADMIN — AMIODARONE HYDROCHLORIDE 400 MG: 200 TABLET ORAL at 09:11

## 2018-03-03 RX ADMIN — GUAIFENESIN 1200 MG: 600 TABLET, EXTENDED RELEASE ORAL at 09:11

## 2018-03-03 RX ADMIN — ASPIRIN 81 MG: 81 TABLET ORAL at 09:11

## 2018-03-03 RX ADMIN — POLYETHYLENE GLYCOL (3350) 17 G: 17 POWDER, FOR SOLUTION ORAL at 09:12

## 2018-03-03 NOTE — THERAPY TREATMENT NOTE
"Acute Care - Physical Therapy Treatment Note  [facility]     Patient Name: Chastity Berrios  : 1955  MRN: [id number]  Today's Date: 3/3/2018  Onset of Illness/Injury or Date of Surgery Date: 18          Admit Date: 2018    Visit Dx:    ICD-10-CM ICD-9-CM   1. Generalized weakness R53.1 780.79   2. Aortic valve stenosis, etiology of cardiac valve disease unspecified I35.0 424.1     Patient Active Problem List   Diagnosis   • Psoriasis   • Paget's bone disease   • Perennial allergic rhinitis   • PAT (paroxysmal atrial tachycardia)   • Migraine without aura and without status migrainosus, not intractable   • IGT (impaired glucose tolerance)   • Osteopenia   • Vitamin D deficiency   • Nonrheumatic aortic valve stenosis   • Aortic stenosis   • Aortic valve stenosis               Adult Rehabilitation Note       18 0955 18 0931 18 0937    Rehab Assessment/Intervention    Discipline physical therapy assistant  -RW physical therapist  -EM physical therapist  -EM    Document Type therapy note (daily note)  -RW therapy note (daily note)  -EM therapy note (daily note)  -EM    Subjective Information agree to therapy;no complaints  -RW agree to therapy;no complaints   states she feels much better today   -EM agree to therapy   states she feels very \"loopy\"   -EM    Patient Effort, Rehab Treatment good  -RW good  -EM good  -EM    Precautions/Limitations cardiac precautions  -RW cardiac precautions  -EM cardiac precautions  -EM    Recorded by [RW] Karissa Griffin PTA [EM] Ashley Caba, PT [EM] Ashley Caba, PT    Vital Signs    Pre Systolic BP Rehab   124  -EM    Pre Treatment Diastolic BP   69  -EM    Pretreatment Heart Rate (beats/min)   77  -EM    Posttreatment Heart Rate (beats/min)   79  -EM    Pre SpO2 (%)   95  -EM    O2 Delivery Pre Treatment   supplemental O2  -EM    Post SpO2 (%)   95  -EM    O2 Delivery Post Treatment   supplemental O2  -EM    Recorded by   [EM] " Ashley Caba, PT    Pain Assessment    Pain Assessment No/denies pain  -RW No/denies pain  -EM No/denies pain  -EM    Recorded by [RW] Karissa Griffin PTA [EM] Ashley Caba, PT [EM] Ashley Caba, PT    Cognitive Assessment/Intervention    Current Cognitive/Communication Assessment functional  -RW functional  -EM     Orientation Status oriented x 4  -RW      Follows Commands/Answers Questions 100% of the time  -RW      Personal Safety WNL/WFL  -RW      Personal Safety Interventions fall prevention program maintained;nonskid shoes/slippers when out of bed  -RW      Recorded by [RW] Karissa Griffin PTA [EM] Ashley Caba, PT     Bed Mobility, Assessment/Treatment    Bed Mobility, Assistive Device  head of bed elevated  -EM head of bed elevated  -EM    Bed Mob, Supine to Sit, [place] supervision required;verbal cues required  -RW      Bed Mob, Sit to Supine, [place] supervision required  -RW supervision required  -EM minimum assist (75% patient effort);2 person assist required  -EM    Recorded by [RW] Karissa Griffin PTA [EM] Ashley Caba, PT [EM] Ashley Caba, PT    Transfer Assessment/Treatment    Transfers, Sit-Stand [place] supervision required  -RW stand by assist  -EM contact guard assist  -EM    Transfers, Stand-Sit [place] supervision required  -RW stand by assist  -EM contact guard assist  -EM    Recorded by [RW] Karissa Griffin PTA [EM] Ashley Caba, PT [EM] Ashley Caba, PT    Gait Assessment/Treatment    Gait, [place] Level supervision required  -RW contact guard assist  -EM contact guard assist;2 person assist required  -EM    Gait, Distance (Feet) 300  -  -  -EM    Gait, Gait Pattern Analysis swing-through gait  -RW      Gait, Gait Deviations forward flexed posture;bilateral:;gallo decreased;narrow base;step length decreased  -RW  gallo decreased  -EM    Gait, Safety Issues step length decreased  -RW       Gait, Comment  steadier with ambulation, much more alert today   -EM cues to keep eyes open, one episode of knees buckling but able to self correct, pt seems very groggy even during ambulation   -EM    Recorded by [RW] Karissa Griffin, PTA [EM] Ashley Patelcalf, PT [EM] Ashley Patelcalf, PT    Stairs Assessment/Treatment    Number of Stairs 8  -RW      Stairs, Handrail Location right side (ascending)  -RW      Stairs, [place] Level supervision required  -RW      Stairs, Technique Used step over step (ascending);step over step (descending)  -RW      Recorded by [RW] Karissa Griffin PTA      Therapy Exercises    Exercise Protocols --   pt has been performing exercises independently  -RW --   5 reps cardiac protocol, level 4  -EM --   5 reps cardiac protocol, level 3  -EM    Recorded by [RW] Karissa Griffin, PTA [EM] Ashley SANTOS [place], PT [EM] Ashley Patelcalf, PT    Positioning and Restraints    Pre-Treatment Position in bed  -RW sitting in chair/recliner  -EM sitting in chair/recliner  -EM    Post Treatment Position bed  -RW bed  -EM bed  -EM    In Bed fowlers;call light within reach;encouraged to call for assist;with family/caregiver;notified nsg  -RW supine;call light within reach  -EM supine;call light within reach  -EM    Recorded by [RW] Karissa Griffin, PTA [EM] Ashley SANTOS [place], PT [EM] Ashley Patelcalf, PT      User Key  (r) = Recorded By, (t) = Taken By, (c) = Cosigned By    Initials Name Effective Dates    EM Ashley [place]os[place], PT 12/01/15 -     RW Karissa Griffin, PTA 04/06/16 -                 IP PT Goals       03/03/18 1004 02/28/18 0920       Cardiopulmonary PT LTG    Cardiopulmonary PT LTG, Date Established  02/28/18  -EM     Cardiopulmonary PT LTG, Time to Achieve  1 wk  -EM     Cardiopulmonary PT LTG, Level Level V  -RW Level V  -EM     Cardiopulmonary PT LTG, Outcome goal met  -        User Key  (r) = Recorded By, (t) = Taken By, (c) = Cosigned By    Initials Name Provider  Type    EM Ashley Caba, PT Physical Therapist     Karissa Griffin, CRISTAL Physical Therapy Assistant          Physical Therapy Education     Title: PT OT SLP Therapies (Resolved)     Topic: Physical Therapy (Resolved)     Point: Mobility training (Resolved)    Learning Progress Summary    Learner Readiness Method Response Comment Documented by Status   Patient Acceptance E VU,DU   03/03/18 1002 Done    Acceptance E VU  EM 03/02/18 0935 Done    Acceptance E NR  EM 03/01/18 0939 Active    Acceptance E NR  EM 02/28/18 0919 Active   Family Acceptance E VU,DU   03/03/18 1002 Done               Point: Home exercise program (Resolved)    Learning Progress Summary    Learner Readiness Method Response Comment Documented by Status   Patient Acceptance E VU,DU   03/03/18 1002 Done    Acceptance E VU  EM 03/02/18 0935 Done    Acceptance E NR  EM 03/01/18 0939 Active    Acceptance E NR  EM 02/28/18 0919 Active   Family Acceptance E VU,Critical access hospital 03/03/18 1002 Done               Point: Body mechanics (Resolved)    Learning Progress Summary    Learner Readiness Method Response Comment Documented by Status   Patient Acceptance E VU,DU   03/03/18 1002 Done   Family Acceptance E VU,Critical access hospital 03/03/18 1002 Done               Point: Precautions (Resolved)    Learning Progress Summary    Learner Readiness Method Response Comment Documented by Status   Patient Acceptance E VU,Critical access hospital 03/03/18 1002 Done   Family Acceptance E VU,Critical access hospital 03/03/18 1002 Done                      User Key     Initials Effective Dates Name Provider Type Discipline    EM 12/01/15 -  Ashley Caba, PT Physical Therapist PT     04/06/16 -  Karissa Griffin PTA Physical Therapy Assistant PT                    PT Recommendation and Plan  Anticipated Discharge Disposition: home with assist  Planned Therapy Interventions: bed mobility training, gait training, home exercise program  PT Frequency: daily  Plan of Care Review  Plan Of Care Reviewed With:  patient  Outcome Summary/Follow up Plan: Pt has met all goals w/ PT at this time. Educated on stair climbing and demonstrated correctly. Pt was also educated on bed mobility w/ a flat bed and demonstrated correctly as well. Hopeful for d/c home today. PT will sign off at this time.           Outcome Measures       03/03/18 0955 03/02/18 0900 03/01/18 0900    How much help from another person do you currently need...    Turning from your back to your side while in flat bed without using bedrails? 4  -RW 3  -EM 3  -EM    Moving from lying on back to sitting on the side of a flat bed without bedrails? 4  -RW 3  -EM 3  -EM    Moving to and from a bed to a chair (including a wheelchair)? 4  -RW 3  -EM 3  -EM    Standing up from a chair using your arms (e.g., wheelchair, bedside chair)? 4  -RW 3  -EM 3  -EM    Climbing 3-5 steps with a railing? 3  -RW 3  -EM 3  -EM    To walk in hospital room? 3  -RW 3  -EM 3  -EM    AM-PAC 6 Clicks Score 22  -RW 18  -EM 18  -EM    Functional Assessment    Outcome Measure Options AM-PAC 6 Clicks Basic Mobility (PT)  -RW        User Key  (r) = Recorded By, (t) = Taken By, (c) = Cosigned By    Initials Name Provider Type    OLEGARIO Caba, PT Physical Therapist     Karissa Griffin PTA Physical Therapy Assistant           Time Calculation:         PT Charges       03/03/18 1000          Time Calculation    Start Time 0946  -RW      Stop Time 0956  -RW      Time Calculation (min) 10 min  -RW      PT Received On 03/03/18  -        User Key  (r) = Recorded By, (t) = Taken By, (c) = Cosigned By    Initials Name Provider Type     Karissa Griffin PTA Physical Therapy Assistant          Therapy Charges for Today     Code Description Service Date Service Provider Modifiers Qty    [id number] HC PT THER PROC EA 15 MIN 3/3/2018 Karissa Griffin PTA GP 1          PT G-Codes  Outcome Measure Options: AM-PAC 6 Clicks Basic Mobility (PT)    Karissa Griffin PTA  3/3/2018

## 2018-03-03 NOTE — PLAN OF CARE
Problem: Patient Care Overview (Adult)  Goal: Plan of Care Review  Outcome: Ongoing (interventions implemented as appropriate)   03/03/18 0506   Coping/Psychosocial Response Interventions   Plan Of Care Reviewed With patient   Patient Care Overview   Progress progress toward functional goals as expected   Outcome Evaluation   Outcome Summary/Follow up Plan No changes during night. VSS. No complaints. Will follow.

## 2018-03-03 NOTE — DISCHARGE INSTR - ACTIVITY
Continue to use your incentive spirometer for an additional 2 weeks.   Continue to wear your MELANY hose for an additional 2 weeks. You may remove them at night.   Walk 10 minutes at a time at least 3 times a day.   Do not drive for 2 weeks and no longer taking narcotics.   Do not lift, push or pull greater than 10 pounds for 6 weeks.   You may shower, but do not submerge your incisions until your surgeon approves (i.e., no baths, pools, hot tubs, etc.).   Clean your incision daily in the shower with Dial or Ivory soap. Do not put any additional lotions, creams, or any other substance on your incision without your surgeon's approval.

## 2018-03-03 NOTE — PROGRESS NOTES
Continued Stay Note  [facility]     Patient Name: Chastity Berrios  MRN: [id number]  Today's Date: 3/3/2018    Admit Date: 2/27/2018          Discharge Plan       03/03/18 1505    Case Management/Social Work Plan    Additional Comments Spoke with Rita at [facility] and she confirms that they have received all information and will deliver o2 tonoght. Milady Patel RN      03/03/18 1457    Case Management/Social Work Plan    Additional Comments Spoke with nurse and patient needs nocturnal o2 at [place].  Spoke with spouse via telephone and information faxed to [facility].  [facility] to deliver o2 tonight to patient home.  Milady Patel RN              Discharge Codes     None        Expected Discharge Date and Time     Expected Discharge Date Expected Discharge Time    Mar 3, 2018             Milady Patel RN

## 2018-03-03 NOTE — PROGRESS NOTES
"    Patient Name: Chastity Berrios  :1955  62 y.o.      Patient Care Team:  Alvaro Small MD as PCP - General (Internal Medicine)  Alvaro Small MD as PCP - Internal Medicine  Jose Bonner MD as Consulting Physician (Cardiology)  Fabián Morales MD as Consulting Physician (Cardiology)    Chief Complaint: follow up aortic valve replacement    Interval History: Feels great. Plans to go home. Walked well in [place] and did one flight of stairs.        Objective   Vital Signs  Temp:  [97.8 °F (36.6 °C)-98.6 °F (37 °C)] 98 °F (36.7 °C)  Heart Rate:  [73-93] 93  Resp:  [16-18] 16  BP: (111-144)/(62-72) 111/62    Intake/Output Summary (Last 24 hours) at 18 1209  Last data filed at 18 1204   Gross per 24 hour   Intake             1100 ml   Output              300 ml   Net              800 ml     Flowsheet Rows         First Filed Value    Admission Height  165.1 cm (65\") Documented at 2018 0532    Admission Weight  82.6 kg (182 lb) Documented at 2018 0532          Physical Exam:   General Appearance:    Alert, cooperative, in no acute distress   Lungs:     Clear to auscultation.  Normal respiratory effort and rate.      Heart:    Regular rhythm and normal rate, normal S1 and S2, no murmurs, gallops or rubs.     Chest Wall:    No abnormalities observed   Abdomen:     Soft, nontender, positive bowel sounds.     Extremities:   no cyanosis, clubbing or edema.  No marked joint deformities.  Adequate musculoskeletal strength.  Right radial cath site- soft. Pulses intact. Dressing removed.        Results Review:      Results from last 7 days  Lab Units 18  0345   SODIUM mmol/L 140   POTASSIUM mmol/L 4.0   CHLORIDE mmol/L 96*   CO2 mmol/L 32.0*   BUN mg/dL 16   CREATININE mg/dL 0.94   GLUCOSE mg/dL 107*   CALCIUM mg/dL 9.2           Results from last 7 days  Lab Units 18  0345   WBC 10*3/mm3 7.69   HEMOGLOBIN g/dL 11.9   HEMATOCRIT % 36.9   PLATELETS 10*3/mm3 185       Results " from last 7 days  Lab Units 03/03/18  0345 03/02/18  0354 03/01/18  1417  02/27/18  1022 02/26/18  0835   INR  1.69* 1.25* 1.31*  < > 1.50* 1.01   APTT seconds  --   --   --   --  31.3 25.8   < > = values in this interval not displayed.    Results from last 7 days  Lab Units 02/28/18  0316   MAGNESIUM mg/dL 2.1       Results from last 7 days  Lab Units 02/26/18  0835   CHOLESTEROL mg/dL 198   TRIGLYCERIDES mg/dL 123   HDL CHOL mg/dL 48   LDL CHOL mg/dL 125*               Medication Review:     amiodarone 400 mg Oral Q24H   aspirin 81 mg Oral Daily   cetirizine 10 mg Oral Daily   enoxaparin 40 mg Subcutaneous Daily   famotidine 20 mg Intravenous Once   furosemide 40 mg Oral Daily   guaiFENesin 1,200 mg Oral Q12H   metoprolol tartrate 37.5 mg Oral Q12H   mupirocin  Each Nare BID   OCUVITE-LUTEIN 1 tablet Oral Daily   pantoprazole 40 mg Oral Daily   polyethylene glycol 17 g Oral Daily   potassium chloride 20 mEq Oral Daily   sennosides-docusate sodium 2 tablet Oral Nightly          insulin regular infusion 1 unit/mL (CCU use) 0-50 Units/hr Last Rate: Stopped (02/28/18 0630)   milrinone 0.25-0.75 mcg/kg/min    sodium chloride 30 mL/hr Last Rate: 30 mL/hr (02/27/18 1034)   sodium chloride 30 mL/hr    vasopressin 0.02-0.1 Units/min        Assessment/Plan   1. Severe aortic stenosis s/p mini sternotomy AVR (porcine) . Normal LV function pre op. No coronary disease.   2. history of atrial tachycardia - s/p ablation. Continue beta blocker. No evidence of atrial fibrillation post op. She has been on amiodarone post operatively. 3  3. Anticoagulation - warfarin was started. INR 1.6 (up from 1.2). Will decrease dose and recheck INR on Monday with home health.     OK to discharge from a cardiac standpoint. She should see STEPHANIE in the office in one week and  in one month.     STEPHANIE Bauman  [facility]  03/03/18  12:09 PM

## 2018-03-03 NOTE — PLAN OF CARE
Problem: Inpatient Physical Therapy  Goal: Cardiopulmonary Goal LTG- PT  Outcome: Outcome(s) achieved Date Met: 03/03/18 03/03/18 1004   Cardiopulmonary PT LTG   Cardiopulmonary PT LTG, Level Level V   Cardiopulmonary PT LTG, Outcome goal met

## 2018-03-03 NOTE — PROGRESS NOTES
Continued Stay Note  [facility]     Patient Name: Chastity Berrios  MRN: [id number]  Today's Date: 3/3/2018    Admit Date: 2/27/2018          Discharge Plan       03/03/18 1450    Case Management/Social Work Plan    Additional Comments Spoke with nurse and patient needs nocturnal o2 at dc.  Spoke with spouse via telephone and information faxed to [facility].  [facility] to deliver o2 tonight to patient home.  Milady Patel RN              Discharge Codes     None        Expected Discharge Date and Time     Expected Discharge Date Expected Discharge Time    Mar 3, 2018             Milady Patel RN

## 2018-03-03 NOTE — PROGRESS NOTES
POD # 4 AVR  VSS, no C/O  Labs noted  No BM yet  Lungs clear, cor reg, incisions clean  Normal course,D/C today if BM

## 2018-03-03 NOTE — PLAN OF CARE
Problem: Patient Care Overview (Adult)  Goal: Plan of Care Review  Outcome: Ongoing (interventions implemented as appropriate)   03/03/18 1001   Coping/Psychosocial Response Interventions   Plan Of Care Reviewed With patient   Outcome Evaluation   Outcome Summary/Follow up Plan Pt has met all goals w/ PT at this time. Educated on stair climbing and demonstrated correctly. Pt was also educated on bed mobility w/ a flat bed and demonstrated correctly as well. Hopeful for d/c home today. PT will sign off at this time.

## 2018-03-04 LAB
ABO + RH BLD: NORMAL
ABO + RH BLD: NORMAL
BH BB BLOOD EXPIRATION DATE: NORMAL
BH BB BLOOD EXPIRATION DATE: NORMAL
BH BB BLOOD TYPE BARCODE: 9500
BH BB BLOOD TYPE BARCODE: 9500
BH BB DISPENSE STATUS: NORMAL
BH BB DISPENSE STATUS: NORMAL
BH BB PRODUCT CODE: NORMAL
BH BB PRODUCT CODE: NORMAL
BH BB UNIT NUMBER: NORMAL
BH BB UNIT NUMBER: NORMAL
CROSSMATCH INTERPRETATION: NORMAL
CROSSMATCH INTERPRETATION: NORMAL
UNIT  ABO: NORMAL
UNIT  ABO: NORMAL
UNIT  RH: NORMAL
UNIT  RH: NORMAL

## 2018-03-05 NOTE — PROGRESS NOTES
Case Management Discharge Note    Final Note: Pt d/c home 3/3/18 with HC scheduled to follow and [facility] to supply oxygen.  SARAH, RN/CCP    Discharge Placement     Facility/Agency Request Status Selected? Address Phone Number Fax Number    [facility] Accepted    Yes [address] [address] [phone]             Discharge Codes: 06  Discharged/transferred to home under care of organized home health service organization in anticipation of skilled care (3/3/18)

## 2018-03-05 NOTE — ANESTHESIA POSTPROCEDURE EVALUATION
"Patient: Chastity Berrios    Procedure Summary     Date Anesthesia Start Anesthesia Stop Room / Location    02/27/18 0642 1016  [facility] /  [facility]       Procedure Diagnosis Surgeon Provider    EMANI MINI STERNOTOMY AORTIC VALVE REPLACEMENT PRP (N/A Chest) Aortic valve stenosis, etiology of cardiac valve disease unspecified  (Aortic valve stenosis, etiology of cardiac valve disease unspecified [I35.0]) MD Albert Linares MD          Anesthesia Type: general  Last vitals  BP   116/68 (03/03/18 1226)   Temp   36.4 °C (97.6 °F) (03/03/18 1226)   Pulse   80 (03/03/18 1226)   Resp   16 (03/03/18 1226)     SpO2   94 % (03/03/18 1226)     Post Anesthesia Care and Evaluation    Patient participation: complete - patient cannot participate  Anesthetic complications: No anesthetic complications    Cardiovascular status: acceptable  Respiratory status: acceptable  Hydration status: acceptable    Comments: Patient was discharged prior to being evaluated by Anesthesia...per chart review, no anesthetic complications were noted.  NOT MEDICALLY DIRECTED  /68 (BP Location: Right arm, Patient Position: Sitting)  Pulse 80  Temp 36.4 °C (97.6 °F) (Oral)   Resp 16  Ht 165.1 cm (65\")  Wt 81.1 kg (178 lb 12.8 oz)  SpO2 94%  BMI 29.75 kg/m2        "

## 2018-03-06 NOTE — DISCHARGE SUMMARY
Date of Admission: 2/27/2018  Date of Discharge:  3/6/2018    Discharge Diagnosis:   -Severe aortic stenosis- s/p mini-sternotomy AVR(tissue)  -Paroxysmal atrial tachycardia- s/p ablation   -Psoriasis    Presenting Problem/History of Present Illness  Aortic valve stenosis, etiology of cardiac valve disease unspecified [I35.0]  Aortic valve stenosis, etiology of cardiac valve disease unspecified [I35.0]     Hospital Course  Patient is a 62 y.o. female presented with symptoms of shortness of air chest discomfort and presyncope and a chronic cough since the fall. She underwent EMANI which showed severe aortic stenosis. She was referred for surgical evaluation. After having appropriate pre-operative workup she was scheduled for surgery. On  2/27/18 she underwent aortic valve replacement using a 23mm porcine valve by Dr. Jones. Full details can be found in operative report. She tolerated the procedure well and was transferred to open heart recovery. She was extubated shortly after surgery. She remained stable through the night and was transferred to the stepdown unit on POD1. Over the next couple of days chest tubes, pacing wires, tobar, and cordis were removed without issue. She continue to progress as expected with increased activity and gentle diuresis.  She was started on low dose coumadin on POD3. Coumadin will be managed by cardiology outpatient. On POD4 patient was ready for discharge per Dr. Griffin. Patient provided with appropriate discharge education and instructed to call office with any questions.     Procedures Performed  Procedure(s):  EMANI MINI STERNOTOMY AORTIC VALVE REPLACEMENT PRP      Consults:   Consults     No orders found from 1/29/2018 to 2/28/2018.      Cardiology consulted    Pertinent Test Results:    Lab Results   Component Value Date    WBC 7.69 03/03/2018    HGB 11.9 03/03/2018    HCT 36.9 03/03/2018    MCV 88.5 03/03/2018     03/03/2018      Lab Results   Component Value Date    GLUCOSE  107 (H) 03/03/2018    CALCIUM 9.2 03/03/2018     03/03/2018    K 4.0 03/03/2018    CO2 32.0 (H) 03/03/2018    CL 96 (L) 03/03/2018    BUN 16 03/03/2018    CREATININE 0.94 03/03/2018    EGFRIFAFRI 57 (L) 11/01/2017    EGFRIFNONA 60 (L) 03/03/2018    BCR 17.0 03/03/2018    ANIONGAP 12.0 03/03/2018     Lab Results   Component Value Date    INR 1.69 (H) 03/03/2018    PROTIME 19.6 (H) 03/03/2018         Condition on Discharge:  Stable     Vital Signs      Vitals:    03/02/18 2307 03/03/18 0516 03/03/18 0754 03/03/18 1226   BP: 118/62 123/70 111/62 116/68   BP Location: Right arm Right arm Right arm Right arm   Patient Position: Lying Lying Sitting Sitting   Pulse: 73 87 93 80   Resp: 16 16 16 16   Temp: 98.6 °F (37 °C) 98.1 °F (36.7 °C) 98 °F (36.7 °C) 97.6 °F (36.4 °C)   TempSrc: Oral Oral Oral Oral   SpO2: 96% 96% 97% 94%   Weight:  81.1 kg (178 lb 12.8 oz)     Height:               Discharge Disposition  Home or Self Care    Discharge Medications   Agustina Chastity ANAIS   Home Medication Instructions KAYLIN:[id number]    Printed on:03/06/18 9234   Medication Information                      amiodarone (PACERONE) 200 MG tablet  Take 1 tablet by mouth Daily.             aspirin 81 MG EC tablet  Take 1 tablet by mouth Daily.             calcipotriene-betamethasone (TACLONEX) 0.005-0.064 % external suspension  Apply  topically Daily.             fluticasone (FLONASE) 50 MCG/ACT nasal spray  2 sprays into each nostril Daily As Needed.             furosemide (LASIX) 40 MG tablet  Take 1 tablet by mouth Daily.             HYDROcodone-acetaminophen (NORCO) 5-325 MG per tablet  Take 1 tablet by mouth Every 4 (Four) Hours As Needed for Moderate Pain  for up to 6 days.             loratadine (CLARITIN) 10 MG tablet  Take 10 mg by mouth Daily.             metoprolol tartrate 37.5 MG tablet  Take 37.5 mg by mouth Every 12 (Twelve) Hours.             Multiple Vitamins-Minerals (MULTIVITAMIN ADULTS PO)  Take 1 tablet by mouth Daily.              potassium chloride (MICRO-K) 10 MEQ CR capsule  Take 2 capsules by mouth Daily.             vitamin D (ERGOCALCIFEROL) 17530 units capsule capsule  Take 50,000 Units by mouth 1 (One) Time Per Week. USUALLY TAKES Saturday OR SUNDAY             warfarin (COUMADIN) 1 MG tablet  Take 2 tablets by mouth Daily.                 Discharge Diet: heart healthy    Activity at Discharge:   Activity Instructions     Continue to use your incentive spirometer for an additional 2 weeks.   Continue to wear your MELANY hose for an additional 2 weeks. You may remove them at night.   Walk 10 minutes at a time at least 3 times a day.   Do not drive for 2 weeks and no longer taking narcotics.   Do not lift, push or pull greater than 10 pounds for 6 weeks.   You may shower, but do not submerge your incisions until your surgeon approves (i.e., no baths, pools, hot tubs, etc.).   Clean your incision daily in the shower with Dial or Ivory soap. Do not put any additional lotions, creams, or any other substance on your incision without your surgeon's approval.                 Follow-up Appointments  Future Appointments  Date Time Provider Department Center   3/9/2018 11:00 AM STEPHANIE Varela [facility] None   3/28/2018 11:15 AM Jean Claude Jones MD [facility] None   4/10/2018 10:00 AM MD VANI Loza [facility] None   11/8/2018 1:30 PM Alvaro Small MD [id number] None     Additional Instructions for the Follow-ups that You Need to Schedule     Protime-INR    Mar 05, 2018        Ambulatory Referral to Home Health    As directed    Face to Face Visit Date:  3/3/2018    Follow-up Provider for Plan of Care?:  I will be treating the patient on an ongoing basis.  Please send me the Plan of Care for signature.    Follow-up Provider:  KADE HARO [[id number]]    Reason/Clinical Findings:  PT/INR checks and general status    Describe mobility limitations that make leaving home difficult:  general weakness     Nursing/Therapeutic Services Requested:  Other    Frequency:  1 Week 1                        Chan Sanchez PA-C  03/06/18  1:50 PM

## 2018-03-08 ENCOUNTER — TELEPHONE (OUTPATIENT)
Dept: CARDIOLOGY | Facility: CLINIC | Age: 63
End: 2018-03-08

## 2018-03-08 NOTE — TELEPHONE ENCOUNTER
03/08/18  10:39 AM  Mr. Berrios has called Dr. Jones's office and she will be seen tomorrow - tmm.

## 2018-03-08 NOTE — TELEPHONE ENCOUNTER
03/08/18  10:29 AM  Chastity Berrios  1955    Home Phone [phone]   Mobile [phone]     Mrs. Berrios's recently had valve surgery. She has two chest tube incisions that are oozing a scant, clear-yellow drainage. The incisions are red and tender to touch. She denies fever.     She is being seen by Daniela Streeter tomorrow. I advised that they call Dr. Jones's office. Any further recommendations?    Geri OLGUIN RN

## 2018-03-09 ENCOUNTER — TELEPHONE (OUTPATIENT)
Dept: CARDIOLOGY | Facility: CLINIC | Age: 63
End: 2018-03-09

## 2018-03-09 ENCOUNTER — OFFICE VISIT (OUTPATIENT)
Dept: CARDIOLOGY | Facility: CLINIC | Age: 63
End: 2018-03-09

## 2018-03-09 ENCOUNTER — OFFICE VISIT (OUTPATIENT)
Dept: CARDIAC SURGERY | Facility: CLINIC | Age: 63
End: 2018-03-09

## 2018-03-09 VITALS
BODY MASS INDEX: 28.99 KG/M2 | DIASTOLIC BLOOD PRESSURE: 62 MMHG | SYSTOLIC BLOOD PRESSURE: 112 MMHG | HEART RATE: 70 BPM | WEIGHT: 174 LBS | HEIGHT: 65 IN

## 2018-03-09 VITALS
SYSTOLIC BLOOD PRESSURE: 122 MMHG | OXYGEN SATURATION: 96 % | RESPIRATION RATE: 20 BRPM | BODY MASS INDEX: 28.99 KG/M2 | TEMPERATURE: 97.9 F | HEART RATE: 75 BPM | DIASTOLIC BLOOD PRESSURE: 83 MMHG | HEIGHT: 65 IN | WEIGHT: 174 LBS

## 2018-03-09 DIAGNOSIS — I47.1 SVT (SUPRAVENTRICULAR TACHYCARDIA) (HCC): ICD-10-CM

## 2018-03-09 DIAGNOSIS — R29.818 SUSPECTED SLEEP APNEA: Primary | ICD-10-CM

## 2018-03-09 DIAGNOSIS — Z95.3 S/P AORTIC VALVE REPLACEMENT WITH PORCINE VALVE: Primary | ICD-10-CM

## 2018-03-09 DIAGNOSIS — I65.22 LEFT CAROTID STENOSIS: ICD-10-CM

## 2018-03-09 DIAGNOSIS — Z86.79 H/O AORTIC VALVE STENOSIS: ICD-10-CM

## 2018-03-09 DIAGNOSIS — Z95.2 S/P AVR: Primary | ICD-10-CM

## 2018-03-09 DIAGNOSIS — R29.818 SUSPECTED SLEEP APNEA: ICD-10-CM

## 2018-03-09 PROCEDURE — 93000 ELECTROCARDIOGRAM COMPLETE: CPT | Performed by: NURSE PRACTITIONER

## 2018-03-09 PROCEDURE — 99024 POSTOP FOLLOW-UP VISIT: CPT | Performed by: NURSE PRACTITIONER

## 2018-03-09 PROCEDURE — 99214 OFFICE O/P EST MOD 30 MIN: CPT | Performed by: NURSE PRACTITIONER

## 2018-03-09 NOTE — PROGRESS NOTES
Date of Office Visit: 2018  Encounter Provider: STEPHANIE Varela  Place of Service: [facility]  Patient Name: Chastity Berrios  :1955    Chief Complaint   Patient presents with   • Cardiac Valve Problem   :     HPI: Chastity Berrios is a 62 y.o. female is a patient of Dr. Bonner. I am seeing her for the first time today and have reviewed her records.    Her past medical history is significant of coronary artery disease, hyperlipidemia, excisional supraventricular ventricular tachycardia, nonrheumatic aortic stenosis, AVNRT and mild LVH.      She was last seen in the office on 18 as a referral from Dr. Alvaro Small for evaluation of an abnormal thoracic echocardiogram which showed aortic stenosis.  She has a history of AV rizwana reentry tachycardia and was previously followed by Dr. Colindres.  In  she had ablation with Dr. Inocente Colindres for supraventricular tachycardia. Around that time she had cardiac catheterization that showed no significant coronary artery disease.  Overall, she continued to deteriorate a complaint of dry cough, and increasing fatigue.  At that time she reported that symptoms started in  and had progressively gotten worse.    18 she had a transesophageal echo that revealed normal left ventricular systolic function, moderate LVH, severe aortic valve stenosis secondary to congenital bicuspid aortic valve disease, mild aortic valve regurgitation, mild mitral valve regurgitation, and positive sailing test results indicating an atrial level shunt.  On 18 she had a right and left heart catheterization with  revealed normal coronary angiography, normal left ventricular size and systolic function, and moderate to severe aortic valve stenosis.    On 18 she had bilateral carotid duplex that showed mild stenosis in the left internal carotid artery.  The right internal carotid artery was normal.    She had aortic valve  replacement with a porcine valve with Dr. Jones on 2/27/2018. She did not have any evidence of atrial fibrillation postop.  Started on warfarin.  Her last INR was 2.4 on 3/5/2018.  He was discharged with oxygen at night due to low oxygen saturation noted in the hospital.    Presented today for follow-up and denies chest pain, shortness of breath, dizziness, lightheadedness, near syncope, syncope, fatigue, palpitations, edema, blood in the urine or stool, and fever.  She had noticed that her chest tube puncture site was red and oozing a clear yellow drainage the past couple days which has now crusted over.  Has an appointment with Dr. Jones's office today to have that evaluated and get the sutures removed.  Reports that she has not used oxygen at night for the past 2 nights and is concerned if she still needs to use that.  She does report snoring and that she has never been tested for sleep apnea.  She has been walking 30 minutes per day at 10 minute increments and is tolerating this activity with no limitation.  She has some incisional chest discomfort that is improved with pain medication.  No Known Allergies    Past Medical History:   Diagnosis Date   • Angina pectoris    • Aortic regurgitation     trace to mild   • Aortic systolic murmur on examination    • Aortic valvar stenosis    • Arrhythmia    • Arthralgia     both thumbs are getting stiff   • Chest pain    • Coronary artery disease    • Environmental allergies    • First degree AV block    • Heart palpitations    • Hyperlipidemia    • IGT (impaired glucose tolerance)    • Left carotid stenosis 3/9/2018   • Migraines    • Mild concentric left ventricular hypertrophy (LVH)    • Mitral regurgitation     trivial to mild   • Psoriasis     ELBOWS CALOS AND LEGS CALOS   • PSVT (paroxysmal supraventricular tachycardia)    • Pulmonic regurgitation     trace   • Snoring    • SOB (shortness of breath)    • Spinal headache     AFTER CHILDBIRTH   • SVT (supraventricular  "tachycardia)    • Wheezing     NOTHING CURRENT       Past Surgical History:   Procedure Laterality Date   • AORTIC VALVE REPAIR/REPLACEMENT N/A 2/27/2018    Procedure: EMANI MINI STERNOTOMY AORTIC VALVE REPLACEMENT PRP;  Surgeon: Jean Claude Jones MD;  Location: [facility] OR;  Service:    • CARDIAC ABLATION  02/14/2014   • CARDIAC CATHETERIZATION  01/06/2014   • CARDIAC CATHETERIZATION N/A 1/24/2018    Procedure: Right and Left Heart Cath;  Surgeon: Fabián Morales MD;  Location: [facility] INVASIVE LOCATION;  Service:    • CARDIAC CATHETERIZATION N/A 1/24/2018    Procedure: Coronary angiography;  Surgeon: Fabián Morales MD;  Location: [facility] INVASIVE LOCATION;  Service:    • CARDIAC CATHETERIZATION N/A 1/24/2018    Procedure: Left ventriculography;  Surgeon: Fabián Morales MD;  Location: [facility] INVASIVE LOCATION;  Service:    • CATARACT EXTRACTION     • TONSILLECTOMY       Family and social history reviewed.     Review of Systems   Constitution: Negative for fever and malaise/fatigue.   Cardiovascular: Negative for chest pain, dyspnea on exertion and leg swelling.   Respiratory: Positive for snoring. Negative for cough.    Hematologic/Lymphatic: Negative for bleeding problem.   Skin: Positive for poor wound healing.   Neurological: Negative for dizziness.     All other systems were reviewed and are negative      Objective:     Vitals:    03/09/18 1102   BP: 112/62   BP Location: Left arm   Patient Position: Sitting   Pulse: 70   Weight: 78.9 kg (174 lb)   Height: 165.1 cm (65\")     Body mass index is 28.96 kg/(m^2).    PHYSICAL EXAM:  Physical Exam   Constitutional: She is oriented to person, place, and time. She appears well-developed and well-nourished. No distress.   HENT:   Head: Normocephalic.   Eyes: Conjunctivae are normal.   Neck: Normal range of motion. No JVD present.   Cardiovascular: Normal rate, regular rhythm, normal heart sounds and intact distal pulses.    No murmur " heard.  Pulses:       Carotid pulses are 2+ on the right side, and 2+ on the left side.       Radial pulses are 2+ on the right side, and 2+ on the left side.        Posterior tibial pulses are 2+ on the right side, and 2+ on the left side.   Pulmonary/Chest: Effort normal and breath sounds normal. No respiratory distress. She has no wheezes. She has no rhonchi. She has no rales. She exhibits no tenderness.   midsternal incision reddened, intact, tender, no drainage    Epigastric puncture site with sutures intact, tender reddened, dried yellow drainage       Abdominal: Soft. Bowel sounds are normal. She exhibits no distension.   Musculoskeletal: Normal range of motion. She exhibits no edema.   Neurological: She is alert and oriented to person, place, and time.   Skin: Skin is warm, dry and intact. No rash noted. She is not diaphoretic. No cyanosis.   Psychiatric: She has a normal mood and affect. Her behavior is normal. Judgment and thought content normal.       ECG 12 Lead  Date/Time: 3/9/2018 11:54 AM  Performed by: CHRIS HERNANDEZ  Authorized by: CHRIS HERNANDEZ   Comparison: compared with previous ECG from 2/28/2018  Similar to previous ECG  Rhythm: sinus rhythm  Rate: normal  BPM: 70  Conduction: 1st degree  ST Segments: ST segments normal  T depression: V1 and V2  QRS axis: normal  Clinical impression: abnormal ECG          Current Outpatient Prescriptions   Medication Sig Dispense Refill   • amiodarone (PACERONE) 200 MG tablet Take 1 tablet by mouth Daily. 60 tablet 0   • aspirin 81 MG EC tablet Take 1 tablet by mouth Daily. 60 tablet 1   • calcipotriene-betamethasone (TACLONEX) 0.005-0.064 % external suspension Apply  topically Daily.     • fluticasone (FLONASE) 50 MCG/ACT nasal spray 2 sprays into each nostril Daily As Needed.     • furosemide (LASIX) 40 MG tablet Take 1 tablet by mouth Daily. 7 tablet 1   • HYDROcodone-acetaminophen (NORCO) 5-325 MG per tablet Take 1 tablet by mouth Every 4 (Four) Hours As  Needed for Moderate Pain  for up to 6 days. 60 tablet 0   • loratadine (CLARITIN) 10 MG tablet Take 10 mg by mouth Daily.     • metoprolol tartrate 37.5 MG tablet Take 37.5 mg by mouth Every 12 (Twelve) Hours. 60 tablet 0   • Multiple Vitamins-Minerals (MULTIVITAMIN ADULTS PO) Take 1 tablet by mouth Daily.     • potassium chloride (MICRO-K) 10 MEQ CR capsule Take 2 capsules by mouth Daily. (Patient taking differently: Take 10 mEq by mouth Daily.) 7 capsule 0   • vitamin D (ERGOCALCIFEROL) 42563 units capsule capsule Take 50,000 Units by mouth 1 (One) Time Per Week. USUALLY TAKES Saturday OR SUNDAY     • warfarin (COUMADIN) 1 MG tablet Take 2 tablets by mouth Daily. 30 tablet 3     No current facility-administered medications for this visit.      Facility-Administered Medications Ordered in Other Visits   Medication Dose Route Frequency Provider Last Rate Last Dose   • Chlorhexidine Gluconate Cloth 2 % pads 1 application  1 application Topical Q12H PRN STEPHANIE Soto         Assessment:       Diagnosis Plan   1. S/P aortic valve replacement with porcine valve  Ambulatory Referral to Cardiac Rehab   2. H/O aortic valve stenosis     3. SVT (supraventricular tachycardia)     4. Left carotid stenosis      mild- 2/2018   5. Suspected sleep apnea  Overnight Sleep Oximetry Study    Ambulatory Referral to Sleep Medicine        Orders Placed This Encounter   Procedures   • Ambulatory Referral to Cardiac Rehab     Referral Priority:   Routine     Referral Type:   Rehabilitation - Outpatient     Number of Visits Requested:   1   • Ambulatory Referral to Sleep Medicine     Referral Priority:   Routine     Referral Type:   Consultation     Referral Reason:   Specialty Services Required     Referred to Provider:   Gini Dalal MD     Requested Specialty:   Sleep Medicine     Number of Visits Requested:   1   • Overnight Sleep Oximetry Study     Standing Status:   Future     Standing Expiration Date:   3/9/2019      Scheduling Instructions:      To be performed at patient's home   • ECG 12 Lead     This order was created via procedure documentation       Plan:       1. S/P aortic valve replacement with porcine valve due to severe aortic stenosis- doing very well post op, asymptomatic. Puncture site drainageTo be evaluated today in Dr. Jones's office along with suture removal.  She has home health services currently and will be enrolled in [place] cardiac rehabilitation after that.    2.  History of SVT-post-ablation in 2014.  She did not have any atrial fibrillation postop and is on amiodarone daily a prophylactic antiarrythmic.    3.  Left carotid stenosis-mild on carotid duplex in February 2018.  No bruit    4.  Suspected sleep apnea- has not used overnight oxygen for the past 2 nights as this was new for her upon hospital discharge due to low oxygen saturation noted at night time.  She does snore and agrees to be evaluated for sleep apnea. Referral to Dr. Bourne with sleep medicine made.      Plan of care reviewed with Dr. Bonner  Follow up as scheduled on 4/10/18 with Dr. Bonner    Patient was instructed to call the office if new symptoms develop or report to nearest ER if heart attack or stroke is suspected.      It has been a pleasure to participate in this patient's care.      Thank you,  STEPHANIE Varela

## 2018-03-09 NOTE — PATIENT INSTRUCTIONS
Aortic Valve Replacement, Care After  Refer to this sheet in the next few weeks. These instructions provide you with information about caring for yourself after your procedure. Your health care provider may also give you more specific instructions. Your treatment has been planned according to current medical practices, but problems sometimes occur. Call your health care provider if you have any problems or questions after your procedure.  What can I expect after the procedure?  After the procedure, it is common to have:  · Pain around your incision area.  · A small amount of blood or clear fluid coming from your incision.  Follow these instructions at home:  Eating and drinking     · Follow instructions from your health care provider about eating or drinking restrictions.  ¨ Limit alcohol intake to no more than 1 drink per day for nonpregnant women and 2 drinks per day for men. One drink equals 12 oz of beer, 5 oz of wine, or 1½ oz of hard liquor.  ¨ Limit how much caffeine you drink. Caffeine can affect your heart's rate and rhythm.  · Drink enough fluid to keep your urine clear or pale yellow.  · Eat a heart-healthy diet. This should include plenty of fresh fruits and vegetables. If you eat meat, it should be lean cuts. Avoid foods that are:  ¨ High in salt, saturated fat, or sugar.  ¨ Canned or highly processed.  ¨ Fried.  Activity   · Return to your normal activities as told by your health care provider. Ask your health care provider what activities are safe for you.  · Exercise regularly once you have recovered, as told by your health care provider.  · Avoid sitting for more than 2 hours at a time without moving. Get up and move around at least once every 1-2 hours. This helps to prevent blood clots in the legs.  · Do not lift anything that is heavier than 10 lb (4.5 kg) until your health care provider approves.  · Avoid pushing or pulling things with your arms until your health care provider approves. This  includes pulling on handrails to help you climb stairs.  Incision care     · Follow instructions from your health care provider about how to take care of your incision. Make sure you:  ¨ Wash your hands with soap and water before you change your bandage (dressing). If soap and water are not available, use hand .  ¨ Change your dressing as told by your health care provider.  ¨ Leave stitches (sutures), skin glue, or adhesive strips in place. These skin closures may need to stay in place for 2 weeks or longer. If adhesive strip edges start to loosen and curl up, you may trim the loose edges. Do not remove adhesive strips completely unless your health care provider tells you to do that.  · Check your incision area every day for signs of infection. Check for:  ¨ More redness, swelling, or pain.  ¨ More fluid or blood.  ¨ Warmth.  ¨ Pus or a bad smell.  Medicines   · Take over-the-counter and prescription medicines only as told by your health care provider.  · If you were prescribed an antibiotic medicine, take it as told by your health care provider. Do not stop taking the antibiotic even if you start to feel better.  Travel   · Avoid airplane travel for as long as told by your health care provider.  · When you travel, bring a list of your medicines and a record of your medical history with you. Carry your medicines with you.  Driving   · Ask your health care provider when it is safe for you to drive. Do not drive until your health care provider approves.  · Do not drive or operate heavy machinery while taking prescription pain medicine.  Lifestyle     · Do not use any tobacco products, such as cigarettes, chewing tobacco, or e-cigarettes. If you need help quitting, ask your health care provider.  · Resume sexual activity as told by your health care provider. Do not use medicines for erectile dysfunction unless your health care provider approves, if this applies.  · Work with your health care provider to keep  your blood pressure and cholesterol under control, and to manage any other heart conditions that you have.  · Maintain a healthy weight.  General instructions   · Do not take baths, swim, or use a hot tub until your health care provider approves.  · Do not strain to have a bowel movement.  · Avoid crossing your legs while sitting down.  · Check your temperature every day for a fever. A fever may be a sign of infection.  · If you are a woman and you plan to become pregnant, talk with your health care provider before you become pregnant.  · Wear compression stockings if your health care provider instructs you to do this. These stockings help to prevent blood clots and reduce swelling in your legs.  · Tell all health care providers who care for you that you have an artificial (prosthetic) aortic valve. If you have or have had heart disease or endocarditis, tell all health care providers about these conditions as well.  · Keep all follow-up visits as told by your health care provider. This is important.  Contact a health care provider if:  · You develop a skin rash.  · You experience sudden, unexplained changes in your weight.  · You have more redness, swelling, or pain around your incision.  · You have more fluid or blood coming from your incision.  · Your incision feels warm to the touch.  · You have pus or a bad smell coming from your incision.  · You have a fever.  Get help right away if:  · You develop chest pain that is different from the pain coming from your incision.  · You develop shortness of breath or difficulty breathing.  · You start to feel light-headed.  These symptoms may represent a serious problem that is an emergency. Do not wait to see if the symptoms will go away. Get medical help right away. Call your local emergency services (911 in the [place] Do not drive yourself to the hospital.  This information is not intended to replace advice given to you by your health care provider. Make sure you discuss  any questions you have with your health care provider.  Document Released: 07/06/2006 Document Revised: 05/25/2017 Document Reviewed: 11/20/2016  [employer] Interactive Patient Education © 2017 [employer]

## 2018-03-09 NOTE — TELEPHONE ENCOUNTER
Spoke with patient and agrees to follow surgical recommendation of lasix x7 days. She will follow up in his office on the 28th this month. I notified her to call if she develops shortness of breath, swelling, or any new symptoms. AL

## 2018-03-09 NOTE — TELEPHONE ENCOUNTER
Patient was seen today and asked to call and let me know what the instructions were on her bottles of Furosemide and Potassium.   He states that Dr. Grfifin only refilled for 7 tablets of each.  Please advise if patient is to continue on the medications or just complete the 7 tablets as written by Dr. Griffin.      Patient's phone number is [phone]./ BRENDA

## 2018-03-09 NOTE — PROGRESS NOTES
"CARDIOVASCULAR SURGERY FOLLOW-UP PROGRESS NOTE  Chief Complaint: post op         HPI:   Dear Dr. Alvaro Small MD and colleagues:    It was nice to see Chastity Berrios in follow up.  As you know, she is a 62 y.o. female with AVR, atrial fibrillation who underwent AVR. She did well postoperatively and continues to do well. She comes in today complaining of drainage from chest tube incision.  Her activity level has been good. No drainage noted. Suture was still in place, there was some mild erythema no fluctuation. Denies purulent drainage or fevers.      Physical Exam:         /83 (BP Location: Right arm, Patient Position: Sitting, Cuff Size: Adult)  Pulse 75  Temp 97.9 °F (36.6 °C) (Oral)   Resp 20  Ht 165.1 cm (65\")  Wt 78.9 kg (174 lb)  SpO2 96%  BMI 28.96 kg/m2  Heart:  regular rate and rhythm, S1, S2 normal, no murmur, click, rub or gallop  Lungs:  clear to auscultation bilaterally  Extremities:  no edema  Incision(s):  sternum stable, mild erythema chest tube site     Assessment/Plan:     S/P AVR. Overall, she is doing well.    No significant post-op complications  Removed chest tube suture and instructed her to paint with betadine.     No heavy lifting > 10 pounds for 4 more weeks  Keep incisions clean and dry  OK to drive if not taking narcotic pain medicine  Follow-up as scheduled with cardiology  Follow-up as scheduled with PCP  Follow-up with CT surgery at next scheduled visit.        Thank you for allowing me to participate in the care of your   patient.  Regards,  STEPHANIE Murray      "

## 2018-03-12 RX ORDER — POTASSIUM CHLORIDE 750 MG/1
CAPSULE, EXTENDED RELEASE ORAL
Qty: 8 CAPSULE | Refills: 0 | OUTPATIENT
Start: 2018-03-12

## 2018-03-16 ENCOUNTER — CLINICAL SUPPORT (OUTPATIENT)
Dept: CARDIAC SURGERY | Facility: CLINIC | Age: 63
End: 2018-03-16

## 2018-03-16 DIAGNOSIS — I47.1 PAROXYSMAL SUPRAVENTRICULAR TACHYCARDIA (HCC): ICD-10-CM

## 2018-03-16 DIAGNOSIS — Z48.812 AFTERCARE FOLLOWING SURGERY OF THE CIRCULATORY SYSTEM: Primary | ICD-10-CM

## 2018-03-16 DIAGNOSIS — I25.119 ATHEROSCLEROSIS OF NATIVE CORONARY ARTERY WITH ANGINA PECTORIS, UNSPECIFIED WHETHER NATIVE OR TRANSPLANTED HEART (HCC): ICD-10-CM

## 2018-03-16 PROCEDURE — G0180 MD CERTIFICATION HHA PATIENT: HCPCS | Performed by: THORACIC SURGERY (CARDIOTHORACIC VASCULAR SURGERY)

## 2018-03-26 ENCOUNTER — TELEPHONE (OUTPATIENT)
Dept: CARDIOLOGY | Facility: CLINIC | Age: 63
End: 2018-03-26

## 2018-03-26 NOTE — TELEPHONE ENCOUNTER
Ayesha with [facility] left msg saying the pt has been having some lightheadedness in the early morning for about 5-7 days.  She is on metoprolol 37.5 mg and Amiodarone 200 mg daily.   She didn't know if you wanted to do anything or not.    Please advise.    Ayesha # [phone]    Thanks,  Estephanie

## 2018-03-27 ENCOUNTER — TRANSCRIBE ORDERS (OUTPATIENT)
Dept: CARDIAC REHAB | Facility: HOSPITAL | Age: 63
End: 2018-03-27

## 2018-03-27 DIAGNOSIS — Z95.2 STATUS POST AORTIC VALVE REPLACEMENT: Primary | ICD-10-CM

## 2018-03-28 ENCOUNTER — OFFICE VISIT (OUTPATIENT)
Dept: CARDIAC SURGERY | Facility: CLINIC | Age: 63
End: 2018-03-28

## 2018-03-28 VITALS
SYSTOLIC BLOOD PRESSURE: 141 MMHG | RESPIRATION RATE: 20 BRPM | TEMPERATURE: 98 F | BODY MASS INDEX: 29.46 KG/M2 | DIASTOLIC BLOOD PRESSURE: 82 MMHG | HEIGHT: 65 IN | WEIGHT: 176.8 LBS | HEART RATE: 61 BPM | OXYGEN SATURATION: 97 %

## 2018-03-28 DIAGNOSIS — Z95.2 S/P AVR (AORTIC VALVE REPLACEMENT): Primary | ICD-10-CM

## 2018-03-28 PROCEDURE — 99024 POSTOP FOLLOW-UP VISIT: CPT | Performed by: THORACIC SURGERY (CARDIOTHORACIC VASCULAR SURGERY)

## 2018-03-28 NOTE — PROGRESS NOTES
"CARDIOVASCULAR SURGERY FOLLOW-UP PROGRESS NOTE  Chief Complaint: Here for follow-up after mini sternotomy aortic valve replacement        HPI:   Dear Nain and colleagues:    It was nice to see Chastity Berrios in follow up  4 Weeks  after surgery.  As you know, she is a 62 y.o. female with aortic stenosis who underwent aortic valve replacement on 2/27/18. She did well postoperatively and continues to do well. She comes in today complaining of nothing.  Her activity level has been good.       Physical Exam:         /82 (BP Location: Right arm, Patient Position: Sitting, Cuff Size: Adult)   Pulse 61   Temp 98 °F (36.7 °C) (Oral)   Resp 20   Ht 165.1 cm (65\")   Wt 80.2 kg (176 lb 12.8 oz)   SpO2 97%   BMI 29.42 kg/m²   Heart:  regular rate and rhythm, S1, S2 normal, no murmur, click, rub or gallop normal prosthetic valve sounds  Lungs:  clear to auscultation bilaterally  Extremities:  no edema  Incision(s):  mid chest healing well, sternum stable    Assessment/Plan:     S/P AVR. Overall, she is doing well.    No significant post-op complications    Keep incisions clean and dry  OK to drive if not taking narcotic pain medicine  OK to begin cardiac rehab  Follow-up as scheduled with cardiology  Follow-up as scheduled with PCP  Follow-up with CT surgery prn    No restrictions of activity.      Thank you for allowing me to participate in the care of your   patient.  Regards,  Jean Claude Jones MD    "

## 2018-04-02 ENCOUNTER — HOSPITAL ENCOUNTER (OUTPATIENT)
Dept: CARDIOLOGY | Facility: HOSPITAL | Age: 63
Setting detail: RECURRING SERIES
Discharge: HOME | End: 2018-04-02

## 2018-04-02 PROCEDURE — 85610 PROTHROMBIN TIME: CPT

## 2018-04-02 PROCEDURE — 36416 COLLJ CAPILLARY BLOOD SPEC: CPT

## 2018-04-04 ENCOUNTER — OFFICE VISIT (OUTPATIENT)
Dept: CARDIAC REHAB | Facility: HOSPITAL | Age: 63
End: 2018-04-04

## 2018-04-04 VITALS
SYSTOLIC BLOOD PRESSURE: 142 MMHG | DIASTOLIC BLOOD PRESSURE: 70 MMHG | OXYGEN SATURATION: 98 % | WEIGHT: 181.8 LBS | HEIGHT: 65 IN | BODY MASS INDEX: 30.29 KG/M2 | HEART RATE: 68 BPM

## 2018-04-04 DIAGNOSIS — Z95.2 S/P AVR (AORTIC VALVE REPLACEMENT): Primary | ICD-10-CM

## 2018-04-04 PROCEDURE — 93797 PHYS/QHP OP CAR RHAB WO ECG: CPT

## 2018-04-04 NOTE — PROGRESS NOTES
Cardiac Rehab Initial Assessment      Name: Chastity Berrios  :1955 Allergies:Review of patient's allergies indicates no known allergies.   MRN: [id number] 62 y.o. Physician: Alvaro Small MD   Primary Diagnosis:    Diagnosis Plan   1. S/P AVR (aortic valve replacement)      Event Date: 2018 Specialist: Ha     Secondary Diagnosis:  Risk Stratification:Moderate Risk Note Author: Brooklyn Fountain RN     Cardiovascular History: Comments Had ablation 2014 for SVT, chart mentions first degree AV block     EXERCISE AT HOME  yes  30min Uses TM 15 minutes and walks outside the other 15 minutes  5 days per week    EF: 55%      Source: Cath 18          Ambulatory Status:Independent  Ambulatory Fall Risk Assessed on Initial Visit: yes 6 Minute Walk Pre- Cardiac Rehab:  Distance:1209ft      RPE:2  Max. HR: 82       SPO2:95-98    MET: 2.8  MPH: 2.3             RPD: 0  Resting BP: 148/70 LA, 142/70 RA    Peak BP: 158/80  Recovery BP: 140/72  Comments: Walked entire 6 minutes without a stop.  Only complaint was some chronic right hip pain she gets sometimes when walking.      NUTRITION  Lipids:no  According to patient, but chart mentions a diagnosis of hyperlipidemia.If yes, labs as follows;  Total: No components found for: CHOLESTEROL  HDL:   HDL Cholesterol   Date Value Ref Range Status   2018 48 40 - 60 mg/dL Final    Lipids continued:  LDL:  LDL Cholesterol    Date Value Ref Range Status   2018 125 (H) 0 - 100 mg/dL Final     Triglyceride: No components found for: TRIGLYCERIDE   Weight Management:                 Weight: 181.8 lbs  Height: 65 inches                                   BMI: Body mass index is 30.25 kg/m².  Waist Circumference: 39.75  inches   Alcohol Use: Very rarely at a special occasion Diabetes:No, At one point was told she was pre-diabetic, but follows her spouse's diabetic diet so all her numbers have been fine now. Chart notes state IGT.    Last HGBA1C with date if  applicable:No components found for: A1C         SOCIAL HISTORY  Social History     Social History   • Marital status:      Social History Main Topics   • Smoking status: Never Smoker   • Smokeless tobacco: Never Used   • Alcohol use 0.6 oz/week     1 Glasses of wine per week      Comment: social / caffeine use   • Drug use: No   • Sexual activity: Defer     Other Topics Concern   • Not on file       Educational Level (choose one that applies) post college graduate work or degree anthropology/archeology Learning Barriers:Ready to Learn    Family Support:yes    Living Arrangement: lives with their spouse    Risk Factors: Stress  Yes, Clinical Depression  No, Heredity  Yes If Yes mother late in life had a mini-stroke and Hyperlipidemia Yes according to chart  Diabetes No, but has IGT. Exercise Yes (see previous entries) Obesity yes.     Tobacco Adjunct: No        Comorbidities: No comorbidities.  Has Paget's disease.  No medication for that.  Has psoriasis.  Also had bilateral cataract surgery with corrective lens placement. Suspected TRISH, but never had sleep study.       PSYCHOSOCIAL  Clinical Depression: no    Stress: yes     Assess presence or absence of depression using a valid screening tool: yes      PHYSICAL ASSESSMENT  Influenza vaccine: yes  Pneumococcal vaccine: no          Angina: no    Describe angina scale of 0 - 4: 0 = none    Today are you having incisional pain? No. If, Yes, Scale: na Midline chest incision healed.        Today are you having any other pain? No. If, Yes, Scale: na     Diagnosed with Hypertension:no    Heart Sounds: S1 S2 regular with soft murmur     Lung Sounds: normal air entry, lungs clear to auscultation         Assessment: Skin warm, dry, pink.  Very outgoing.  Good historian. Orthopedic Problems: right hip pain with walking after about 5 minutes, but with continued walking stops hurting in about 8 minutes.    Are you being hurt, hit, or frightened by anyone at home or in  your life? no    Are you being neglected by a caregiver? N/A Shoulder flexibility/Range of motion: Above average     Recommended arm activity: Any    Chair sit and reach within: 0 inches   Leg flexibility: Excellent    Leg Strength/Balance/Five times sit to stand: 7 seconds.     Chose one: Average    Recommended stretching: Standing    Assessment: Seems engaged and interested in program    Family attends [place]: yes Time of arrival: 0940  Time of departure: 1140     Patient Goals:  3-4 Increase stamina.  Be able to walk 30 minutes at a time without stopping 5 days per week.  Be able to do some trail walking. Be able to do housework/vacuuming without fatiguing.  Lose weight with long term goal weight of 150 pounds and lose 5-10 pounds in program.  Decrease BMI.         4/4/2018  9:36 AM  Brooklyn Fountain RN

## 2018-04-09 ENCOUNTER — TREATMENT (OUTPATIENT)
Dept: CARDIAC REHAB | Facility: HOSPITAL | Age: 63
End: 2018-04-09

## 2018-04-09 DIAGNOSIS — Z95.2 S/P AVR (AORTIC VALVE REPLACEMENT): Primary | ICD-10-CM

## 2018-04-09 PROCEDURE — 93798 PHYS/QHP OP CAR RHAB W/ECG: CPT

## 2018-04-10 ENCOUNTER — OFFICE VISIT (OUTPATIENT)
Dept: CARDIOLOGY | Facility: CLINIC | Age: 63
End: 2018-04-10

## 2018-04-10 VITALS
OXYGEN SATURATION: 98 % | HEART RATE: 61 BPM | WEIGHT: 182 LBS | SYSTOLIC BLOOD PRESSURE: 130 MMHG | DIASTOLIC BLOOD PRESSURE: 80 MMHG | BODY MASS INDEX: 30.32 KG/M2 | HEIGHT: 65 IN

## 2018-04-10 DIAGNOSIS — Z95.2 H/O PROSTHETIC AORTIC VALVE REPLACEMENT: Primary | ICD-10-CM

## 2018-04-10 PROCEDURE — 99213 OFFICE O/P EST LOW 20 MIN: CPT | Performed by: INTERNAL MEDICINE

## 2018-04-10 RX ORDER — METOPROLOL SUCCINATE 25 MG/1
25 TABLET, EXTENDED RELEASE ORAL DAILY
Qty: 30 TABLET | Refills: 11 | Status: SHIPPED | OUTPATIENT
Start: 2018-04-10 | End: 2019-04-09 | Stop reason: SDUPTHER

## 2018-04-11 ENCOUNTER — TELEPHONE (OUTPATIENT)
Dept: CARDIOLOGY | Facility: CLINIC | Age: 63
End: 2018-04-11

## 2018-04-11 ENCOUNTER — TREATMENT (OUTPATIENT)
Dept: CARDIAC REHAB | Facility: HOSPITAL | Age: 63
End: 2018-04-11

## 2018-04-11 DIAGNOSIS — Z95.2 S/P AVR (AORTIC VALVE REPLACEMENT): Primary | ICD-10-CM

## 2018-04-11 PROCEDURE — 93798 PHYS/QHP OP CAR RHAB W/ECG: CPT

## 2018-04-11 NOTE — TELEPHONE ENCOUNTER
Pt has an upcoming dental appt for a cleaning and she needs to know if she has to be put on an antibiotic prior to the visit?  If so, what and how much to call in, etc.  Please advise.      Thanks,  Estephanie

## 2018-04-12 RX ORDER — AMOXICILLIN 500 MG/1
CAPSULE ORAL
Qty: 4 CAPSULE | Refills: 0 | Status: SHIPPED | OUTPATIENT
Start: 2018-04-12 | End: 2018-11-09 | Stop reason: ALTCHOICE

## 2018-04-12 NOTE — TELEPHONE ENCOUNTER
Pt called back asking if she needs to go for a final INR.  Warfarin was dc'd 4/10/18.      Thanks, [facility]  [phone]

## 2018-04-13 ENCOUNTER — TREATMENT (OUTPATIENT)
Dept: CARDIAC REHAB | Facility: HOSPITAL | Age: 63
End: 2018-04-13

## 2018-04-13 DIAGNOSIS — Z95.2 S/P AVR (AORTIC VALVE REPLACEMENT): Primary | ICD-10-CM

## 2018-04-13 PROCEDURE — 93798 PHYS/QHP OP CAR RHAB W/ECG: CPT

## 2018-04-16 ENCOUNTER — TREATMENT (OUTPATIENT)
Dept: CARDIAC REHAB | Facility: HOSPITAL | Age: 63
End: 2018-04-16

## 2018-04-16 DIAGNOSIS — I35.0 NONRHEUMATIC AORTIC VALVE STENOSIS: Primary | ICD-10-CM

## 2018-04-16 PROCEDURE — 93798 PHYS/QHP OP CAR RHAB W/ECG: CPT

## 2018-04-16 NOTE — PROGRESS NOTES
Subjective:     Encounter Date:04/10/2018      Patient ID: Chastity Berrios is a 62 y.o. female.    Chief Complaint:Aortic stenosis status post aortic valve replacement  History of Present Illness    62-year-old female who presents today for reevaluation.  Patient had aortic valve replacement with a porcine valve.  Clinically she says she feels better than expected and says she actually feels very good.  She has no complaints of palpitations shortness of breath edema lab this chest pain or fatigue.  Surgical wound has healed nicely.    Review of Systems   All other systems reviewed and are negative.      Procedures       Objective:     Physical Exam   Constitutional: She is oriented to person, place, and time. She appears well-developed.   HENT:   Head: Normocephalic.   Eyes: Conjunctivae are normal.   Neck: Normal range of motion.   Cardiovascular: Normal rate, regular rhythm and normal heart sounds.    Pulmonary/Chest: Breath sounds normal.   Abdominal: Soft. Bowel sounds are normal.   Musculoskeletal: Normal range of motion. She exhibits no edema.   Neurological: She is alert and oriented to person, place, and time.   Skin: Skin is warm and dry.   Psychiatric: She has a normal mood and affect. Her behavior is normal.   Vitals reviewed.      Lab Review:       Assessment:          Diagnosis Plan   1. H/O prosthetic aortic valve replacement  Adult Transthoracic Echo Complete W/ Cont if Necessary Per Protocol          Plan:       1.  Status post aortic valve replacement with a bioprosthetic porcine valve.  We'll set her up for a initial postop echocardiogram for valve assessment.  2.  History of SVT she had an ablation back in 2014 no recurrence.  3.  History of left carotid stenosis mild by duplex in February of this year  4.  At this point continue same see her back in one year sooner if she has issues.

## 2018-04-18 ENCOUNTER — TREATMENT (OUTPATIENT)
Dept: CARDIAC REHAB | Facility: HOSPITAL | Age: 63
End: 2018-04-18

## 2018-04-18 DIAGNOSIS — I35.0 NONRHEUMATIC AORTIC VALVE STENOSIS: Primary | ICD-10-CM

## 2018-04-18 PROCEDURE — 93798 PHYS/QHP OP CAR RHAB W/ECG: CPT

## 2018-04-20 ENCOUNTER — TREATMENT (OUTPATIENT)
Dept: CARDIAC REHAB | Facility: HOSPITAL | Age: 63
End: 2018-04-20

## 2018-04-20 DIAGNOSIS — Z95.2 S/P AVR (AORTIC VALVE REPLACEMENT): Primary | ICD-10-CM

## 2018-04-20 PROCEDURE — 93798 PHYS/QHP OP CAR RHAB W/ECG: CPT

## 2018-04-23 ENCOUNTER — TREATMENT (OUTPATIENT)
Dept: CARDIAC REHAB | Facility: HOSPITAL | Age: 63
End: 2018-04-23

## 2018-04-23 DIAGNOSIS — Z95.2 S/P AVR (AORTIC VALVE REPLACEMENT): Primary | ICD-10-CM

## 2018-04-23 PROCEDURE — 93798 PHYS/QHP OP CAR RHAB W/ECG: CPT

## 2018-04-25 ENCOUNTER — TREATMENT (OUTPATIENT)
Dept: CARDIAC REHAB | Facility: HOSPITAL | Age: 63
End: 2018-04-25

## 2018-04-25 DIAGNOSIS — Z95.2 S/P AVR (AORTIC VALVE REPLACEMENT): Primary | ICD-10-CM

## 2018-04-25 PROCEDURE — 93798 PHYS/QHP OP CAR RHAB W/ECG: CPT

## 2018-04-25 NOTE — PROGRESS NOTES
CARDIAC/PULMONARY REHAB NUTRITION EDUCATION/ASSESSMENT      62 y.o.         Height: 65  in    Weight: 182  lb     BMI: 30.3 IBW:  125-140 lb.     Diet Survey Score: 51  Cardiac Risk Factors: Stress, Obesity,Family History Weight Assessment: Overweight  Desired Weight: 165 lb     Current Diet: moderate calorie and fat  Appetite: good    Food records reviewed? Yes     Occupation:  retired      Routine Exercise: mild     Who does the patient live with:   Who does the cooking at home:  self         Pertinent Lab Values:   Total: No components found for: CHOLESTEROL  HDL:   HDL Cholesterol   Date Value Ref Range Status   02/26/2018 48 40 - 60 mg/dL Final     LDL:  LDL Cholesterol    Date Value Ref Range Status   02/26/2018 125 (H) 0 - 100 mg/dL Final     Triglyceride: No components found for: TRIGLYCERIDE  Last HGBA1C with date if applicable:No components found for: A1C  Glucose:   Glucose   Date Value Ref Range Status   03/03/2018 107 (H) 65 - 99 mg/dL Final    Nutritional Supplements: Vitamin D,50,000IU weeky    Pertinent Nutrition-Related Medications:  N/A         Stated Problem Areas / Concerns: Wt loss, avoiding CVD complications     Assessment / Recommendations: We reviewed AHA Gudelines, The Mediterranean  Diet, Choose Your Plate and the merits of a plant based diet. We discussed meal strategies for weight loss:Supportive written information was provided   motivation level:moderate         Goals:  Weight loss with attention to portion sizes, lean choices and high fiber foods. Monitor sodium and reduce processed food choices                 3:05 PM  4/25/2018  Deepa Lincoln RD

## 2018-04-27 ENCOUNTER — TREATMENT (OUTPATIENT)
Dept: CARDIAC REHAB | Facility: HOSPITAL | Age: 63
End: 2018-04-27

## 2018-04-27 DIAGNOSIS — Z95.2 S/P AVR (AORTIC VALVE REPLACEMENT): Primary | ICD-10-CM

## 2018-04-27 PROCEDURE — 93798 PHYS/QHP OP CAR RHAB W/ECG: CPT

## 2018-04-30 ENCOUNTER — TREATMENT (OUTPATIENT)
Dept: CARDIAC REHAB | Facility: HOSPITAL | Age: 63
End: 2018-04-30

## 2018-04-30 DIAGNOSIS — Z95.2 S/P AVR (AORTIC VALVE REPLACEMENT): Primary | ICD-10-CM

## 2018-04-30 DIAGNOSIS — I35.0 NONRHEUMATIC AORTIC VALVE STENOSIS: ICD-10-CM

## 2018-04-30 PROCEDURE — 93798 PHYS/QHP OP CAR RHAB W/ECG: CPT

## 2018-05-02 ENCOUNTER — TREATMENT (OUTPATIENT)
Dept: CARDIAC REHAB | Facility: HOSPITAL | Age: 63
End: 2018-05-02

## 2018-05-02 DIAGNOSIS — Z95.2 S/P AVR (AORTIC VALVE REPLACEMENT): Primary | ICD-10-CM

## 2018-05-02 PROCEDURE — 93798 PHYS/QHP OP CAR RHAB W/ECG: CPT

## 2018-05-04 ENCOUNTER — TREATMENT (OUTPATIENT)
Dept: CARDIAC REHAB | Facility: HOSPITAL | Age: 63
End: 2018-05-04

## 2018-05-04 DIAGNOSIS — Z95.2 S/P AVR (AORTIC VALVE REPLACEMENT): Primary | ICD-10-CM

## 2018-05-04 PROCEDURE — 93798 PHYS/QHP OP CAR RHAB W/ECG: CPT

## 2018-05-07 ENCOUNTER — TREATMENT (OUTPATIENT)
Dept: CARDIAC REHAB | Facility: HOSPITAL | Age: 63
End: 2018-05-07

## 2018-05-07 DIAGNOSIS — Z95.2 S/P AVR (AORTIC VALVE REPLACEMENT): Primary | ICD-10-CM

## 2018-05-07 PROCEDURE — 93798 PHYS/QHP OP CAR RHAB W/ECG: CPT

## 2018-05-09 ENCOUNTER — TREATMENT (OUTPATIENT)
Dept: CARDIAC REHAB | Facility: HOSPITAL | Age: 63
End: 2018-05-09

## 2018-05-09 DIAGNOSIS — Z95.2 S/P AVR (AORTIC VALVE REPLACEMENT): Primary | ICD-10-CM

## 2018-05-09 PROCEDURE — 93798 PHYS/QHP OP CAR RHAB W/ECG: CPT

## 2018-05-11 ENCOUNTER — TREATMENT (OUTPATIENT)
Dept: CARDIAC REHAB | Facility: HOSPITAL | Age: 63
End: 2018-05-11

## 2018-05-11 DIAGNOSIS — Z95.2 S/P AVR (AORTIC VALVE REPLACEMENT): Primary | ICD-10-CM

## 2018-05-11 PROCEDURE — 93798 PHYS/QHP OP CAR RHAB W/ECG: CPT

## 2018-05-14 ENCOUNTER — TREATMENT (OUTPATIENT)
Dept: CARDIAC REHAB | Facility: HOSPITAL | Age: 63
End: 2018-05-14

## 2018-05-14 DIAGNOSIS — Z95.2 S/P AVR (AORTIC VALVE REPLACEMENT): Primary | ICD-10-CM

## 2018-05-14 PROCEDURE — 93798 PHYS/QHP OP CAR RHAB W/ECG: CPT

## 2018-05-16 ENCOUNTER — TREATMENT (OUTPATIENT)
Dept: CARDIAC REHAB | Facility: HOSPITAL | Age: 63
End: 2018-05-16

## 2018-05-16 DIAGNOSIS — Z95.2 S/P AVR (AORTIC VALVE REPLACEMENT): Primary | ICD-10-CM

## 2018-05-16 PROCEDURE — 93798 PHYS/QHP OP CAR RHAB W/ECG: CPT

## 2018-05-17 ENCOUNTER — HOSPITAL ENCOUNTER (OUTPATIENT)
Dept: CARDIOLOGY | Facility: HOSPITAL | Age: 63
Discharge: HOME | End: 2018-05-17
Attending: INTERNAL MEDICINE | Admitting: INTERNAL MEDICINE

## 2018-05-17 VITALS
WEIGHT: 182 LBS | DIASTOLIC BLOOD PRESSURE: 68 MMHG | HEART RATE: 77 BPM | BODY MASS INDEX: 30.32 KG/M2 | HEIGHT: 65 IN | SYSTOLIC BLOOD PRESSURE: 138 MMHG

## 2018-05-17 DIAGNOSIS — Z95.2 H/O PROSTHETIC AORTIC VALVE REPLACEMENT: ICD-10-CM

## 2018-05-17 LAB
AORTIC DIMENSIONLESS INDEX: 0.4 (DI)
ASCENDING AORTA: 3.1 CM
BH CV ECHO MEAS - ACS: 1.1 CM
BH CV ECHO MEAS - AO MAX PG (FULL): 19.2 MMHG
BH CV ECHO MEAS - AO MAX PG: 22.3 MMHG
BH CV ECHO MEAS - AO MEAN PG (FULL): 8 MMHG
BH CV ECHO MEAS - AO MEAN PG: 10 MMHG
BH CV ECHO MEAS - AO ROOT AREA (BSA CORRECTED): 1.7
BH CV ECHO MEAS - AO ROOT AREA: 8.6 CM^2
BH CV ECHO MEAS - AO ROOT DIAM: 3.3 CM
BH CV ECHO MEAS - AO V2 MAX: 236 CM/SEC
BH CV ECHO MEAS - AO V2 MEAN: 144 CM/SEC
BH CV ECHO MEAS - AO V2 VTI: 49.2 CM
BH CV ECHO MEAS - AVA(I,A): 1.3 CM^2
BH CV ECHO MEAS - AVA(I,D): 1.3 CM^2
BH CV ECHO MEAS - AVA(V,A): 1.2 CM^2
BH CV ECHO MEAS - AVA(V,D): 1.2 CM^2
BH CV ECHO MEAS - BSA(HAYCOCK): 2 M^2
BH CV ECHO MEAS - BSA: 1.9 M^2
BH CV ECHO MEAS - BZI_BMI: 30.3 KILOGRAMS/M^2
BH CV ECHO MEAS - BZI_METRIC_HEIGHT: 165.1 CM
BH CV ECHO MEAS - BZI_METRIC_WEIGHT: 82.6 KG
BH CV ECHO MEAS - CONTRAST EF 4CH: 59.3 ML/M^2
BH CV ECHO MEAS - EDV(MOD-SP4): 91 ML
BH CV ECHO MEAS - EDV(TEICH): 104.9 ML
BH CV ECHO MEAS - EF(CUBED): 63 %
BH CV ECHO MEAS - EF(MOD-BP): 59 %
BH CV ECHO MEAS - EF(MOD-SP4): 59.3 %
BH CV ECHO MEAS - EF(TEICH): 54.5 %
BH CV ECHO MEAS - ESV(MOD-SP4): 37 ML
BH CV ECHO MEAS - ESV(TEICH): 47.8 ML
BH CV ECHO MEAS - FS: 28.2 %
BH CV ECHO MEAS - IVS/LVPW: 0.93
BH CV ECHO MEAS - IVSD: 1.1 CM
BH CV ECHO MEAS - LAT PEAK E' VEL: 11 CM/SEC
BH CV ECHO MEAS - LV DIASTOLIC VOL/BSA (35-75): 47.9 ML/M^2
BH CV ECHO MEAS - LV MASS(C)D: 206.7 GRAMS
BH CV ECHO MEAS - LV MASS(C)DI: 108.8 GRAMS/M^2
BH CV ECHO MEAS - LV MAX PG: 3 MMHG
BH CV ECHO MEAS - LV MEAN PG: 2 MMHG
BH CV ECHO MEAS - LV SYSTOLIC VOL/BSA (12-30): 19.5 ML/M^2
BH CV ECHO MEAS - LV V1 MAX: 87.2 CM/SEC
BH CV ECHO MEAS - LV V1 MEAN: 64.4 CM/SEC
BH CV ECHO MEAS - LV V1 VTI: 20.6 CM
BH CV ECHO MEAS - LVIDD: 4.8 CM
BH CV ECHO MEAS - LVIDS: 3.4 CM
BH CV ECHO MEAS - LVLD AP4: 7.4 CM
BH CV ECHO MEAS - LVLS AP4: 6.9 CM
BH CV ECHO MEAS - LVOT AREA (M): 3.1 CM^2
BH CV ECHO MEAS - LVOT AREA: 3.1 CM^2
BH CV ECHO MEAS - LVOT DIAM: 2 CM
BH CV ECHO MEAS - LVPWD: 1.2 CM
BH CV ECHO MEAS - MED PEAK E' VEL: 6 CM/SEC
BH CV ECHO MEAS - MR MAX PG: 109.4 MMHG
BH CV ECHO MEAS - MR MAX VEL: 523 CM/SEC
BH CV ECHO MEAS - MV A DUR: 0.12 SEC
BH CV ECHO MEAS - MV A MAX VEL: 85.4 CM/SEC
BH CV ECHO MEAS - MV DEC SLOPE: 717 CM/SEC^2
BH CV ECHO MEAS - MV DEC TIME: 0.12 SEC
BH CV ECHO MEAS - MV E MAX VEL: 86.9 CM/SEC
BH CV ECHO MEAS - MV E/A: 1
BH CV ECHO MEAS - MV MAX PG: 4.2 MMHG
BH CV ECHO MEAS - MV MEAN PG: 2 MMHG
BH CV ECHO MEAS - MV P1/2T MAX VEL: 88.8 CM/SEC
BH CV ECHO MEAS - MV P1/2T: 36.3 MSEC
BH CV ECHO MEAS - MV V2 MAX: 103 CM/SEC
BH CV ECHO MEAS - MV V2 MEAN: 60.8 CM/SEC
BH CV ECHO MEAS - MV V2 VTI: 33.6 CM
BH CV ECHO MEAS - MVA P1/2T LCG: 2.5 CM^2
BH CV ECHO MEAS - MVA(P1/2T): 6.1 CM^2
BH CV ECHO MEAS - MVA(VTI): 1.9 CM^2
BH CV ECHO MEAS - PA MAX PG (FULL): 2.4 MMHG
BH CV ECHO MEAS - PA MAX PG: 3.2 MMHG
BH CV ECHO MEAS - PA V2 MAX: 89.6 CM/SEC
BH CV ECHO MEAS - PULM A REVS DUR: 0.09 SEC
BH CV ECHO MEAS - PULM A REVS VEL: 19.7 CM/SEC
BH CV ECHO MEAS - PULM DIAS VEL: 45.4 CM/SEC
BH CV ECHO MEAS - PULM S/D: 1.2
BH CV ECHO MEAS - PULM SYS VEL: 56.3 CM/SEC
BH CV ECHO MEAS - PVA(V,A): 1.6 CM^2
BH CV ECHO MEAS - PVA(V,D): 1.6 CM^2
BH CV ECHO MEAS - QP/QS: 0.53
BH CV ECHO MEAS - RAP SYSTOLE: 3 MMHG
BH CV ECHO MEAS - RV MAX PG: 0.84 MMHG
BH CV ECHO MEAS - RV MEAN PG: 1 MMHG
BH CV ECHO MEAS - RV V1 MAX: 45.9 CM/SEC
BH CV ECHO MEAS - RV V1 MEAN: 35.7 CM/SEC
BH CV ECHO MEAS - RV V1 VTI: 10.9 CM
BH CV ECHO MEAS - RVOT AREA: 3.1 CM^2
BH CV ECHO MEAS - RVOT DIAM: 2 CM
BH CV ECHO MEAS - RVSP: 32.6 MMHG
BH CV ECHO MEAS - SI(AO): 221.4 ML/M^2
BH CV ECHO MEAS - SI(CUBED): 35.5 ML/M^2
BH CV ECHO MEAS - SI(LVOT): 34.1 ML/M^2
BH CV ECHO MEAS - SI(MOD-SP4): 28.4 ML/M^2
BH CV ECHO MEAS - SI(TEICH): 30.1 ML/M^2
BH CV ECHO MEAS - SUP REN AO DIAM: 1.7 CM
BH CV ECHO MEAS - SV(AO): 420.8 ML
BH CV ECHO MEAS - SV(CUBED): 67.5 ML
BH CV ECHO MEAS - SV(LVOT): 64.7 ML
BH CV ECHO MEAS - SV(MOD-SP4): 54 ML
BH CV ECHO MEAS - SV(RVOT): 34.2 ML
BH CV ECHO MEAS - SV(TEICH): 57.2 ML
BH CV ECHO MEAS - TAPSE (>1.6): 2 CM2
BH CV ECHO MEAS - TR MAX VEL: 272 CM/SEC
BH CV ECHO MEASUREMENTS AVERAGE E/E' RATIO: 10.22
BH CV XLRA - RV BASE: 2.3 CM
BH CV XLRA - TDI S': 11 CM/SEC
LEFT ATRIUM VOLUME INDEX: 21 ML/M2
SINUS: 2.4 CM
STJ: 2.6 CM

## 2018-05-17 PROCEDURE — 93306 TTE W/DOPPLER COMPLETE: CPT | Performed by: INTERNAL MEDICINE

## 2018-05-17 PROCEDURE — 93306 TTE W/DOPPLER COMPLETE: CPT

## 2018-05-18 ENCOUNTER — TREATMENT (OUTPATIENT)
Dept: CARDIAC REHAB | Facility: HOSPITAL | Age: 63
End: 2018-05-18

## 2018-05-18 DIAGNOSIS — Z95.2 S/P AVR (AORTIC VALVE REPLACEMENT): Primary | ICD-10-CM

## 2018-05-18 PROCEDURE — 93798 PHYS/QHP OP CAR RHAB W/ECG: CPT

## 2018-05-21 ENCOUNTER — TREATMENT (OUTPATIENT)
Dept: CARDIAC REHAB | Facility: HOSPITAL | Age: 63
End: 2018-05-21

## 2018-05-21 DIAGNOSIS — Z95.2 S/P AVR (AORTIC VALVE REPLACEMENT): Primary | ICD-10-CM

## 2018-05-21 PROCEDURE — 93798 PHYS/QHP OP CAR RHAB W/ECG: CPT

## 2018-05-23 ENCOUNTER — TREATMENT (OUTPATIENT)
Dept: CARDIAC REHAB | Facility: HOSPITAL | Age: 63
End: 2018-05-23

## 2018-05-23 DIAGNOSIS — I35.0 NONRHEUMATIC AORTIC VALVE STENOSIS: ICD-10-CM

## 2018-05-23 DIAGNOSIS — Z95.2 S/P AVR (AORTIC VALVE REPLACEMENT): Primary | ICD-10-CM

## 2018-05-23 PROCEDURE — 93798 PHYS/QHP OP CAR RHAB W/ECG: CPT

## 2018-05-25 ENCOUNTER — TREATMENT (OUTPATIENT)
Dept: CARDIAC REHAB | Facility: HOSPITAL | Age: 63
End: 2018-05-25

## 2018-05-25 DIAGNOSIS — Z95.2 S/P AVR (AORTIC VALVE REPLACEMENT): Primary | ICD-10-CM

## 2018-05-25 PROCEDURE — 93798 PHYS/QHP OP CAR RHAB W/ECG: CPT

## 2018-05-30 ENCOUNTER — TREATMENT (OUTPATIENT)
Dept: CARDIAC REHAB | Facility: HOSPITAL | Age: 63
End: 2018-05-30

## 2018-05-30 DIAGNOSIS — Z95.2 S/P AVR (AORTIC VALVE REPLACEMENT): Primary | ICD-10-CM

## 2018-05-30 PROCEDURE — 93798 PHYS/QHP OP CAR RHAB W/ECG: CPT

## 2018-06-04 ENCOUNTER — TREATMENT (OUTPATIENT)
Dept: CARDIAC REHAB | Facility: HOSPITAL | Age: 63
End: 2018-06-04

## 2018-06-04 DIAGNOSIS — I35.0 NONRHEUMATIC AORTIC VALVE STENOSIS: ICD-10-CM

## 2018-06-04 DIAGNOSIS — Z95.2 S/P AVR (AORTIC VALVE REPLACEMENT): Primary | ICD-10-CM

## 2018-06-04 PROCEDURE — 93798 PHYS/QHP OP CAR RHAB W/ECG: CPT

## 2018-06-06 ENCOUNTER — TREATMENT (OUTPATIENT)
Dept: CARDIAC REHAB | Facility: HOSPITAL | Age: 63
End: 2018-06-06

## 2018-06-06 ENCOUNTER — TRANSCRIBE ORDERS (OUTPATIENT)
Dept: ADMINISTRATIVE | Facility: HOSPITAL | Age: 63
End: 2018-06-06

## 2018-06-06 DIAGNOSIS — J84.9 ILD (INTERSTITIAL LUNG DISEASE) (HCC): Primary | ICD-10-CM

## 2018-06-06 DIAGNOSIS — Z95.2 S/P AVR (AORTIC VALVE REPLACEMENT): Primary | ICD-10-CM

## 2018-06-06 PROCEDURE — 93798 PHYS/QHP OP CAR RHAB W/ECG: CPT

## 2018-06-08 ENCOUNTER — TREATMENT (OUTPATIENT)
Dept: CARDIAC REHAB | Facility: HOSPITAL | Age: 63
End: 2018-06-08

## 2018-06-08 DIAGNOSIS — I35.0 NONRHEUMATIC AORTIC VALVE STENOSIS: Primary | ICD-10-CM

## 2018-06-08 PROCEDURE — 93798 PHYS/QHP OP CAR RHAB W/ECG: CPT

## 2018-06-11 ENCOUNTER — APPOINTMENT (OUTPATIENT)
Dept: CARDIAC REHAB | Facility: HOSPITAL | Age: 63
End: 2018-06-11

## 2018-06-13 ENCOUNTER — APPOINTMENT (OUTPATIENT)
Dept: CARDIAC REHAB | Facility: HOSPITAL | Age: 63
End: 2018-06-13

## 2018-06-15 ENCOUNTER — APPOINTMENT (OUTPATIENT)
Dept: CARDIAC REHAB | Facility: HOSPITAL | Age: 63
End: 2018-06-15

## 2018-06-18 ENCOUNTER — APPOINTMENT (OUTPATIENT)
Dept: CARDIAC REHAB | Facility: HOSPITAL | Age: 63
End: 2018-06-18

## 2018-06-20 ENCOUNTER — APPOINTMENT (OUTPATIENT)
Dept: CARDIAC REHAB | Facility: HOSPITAL | Age: 63
End: 2018-06-20

## 2018-06-22 ENCOUNTER — APPOINTMENT (OUTPATIENT)
Dept: CARDIAC REHAB | Facility: HOSPITAL | Age: 63
End: 2018-06-22

## 2018-06-25 ENCOUNTER — APPOINTMENT (OUTPATIENT)
Dept: CARDIAC REHAB | Facility: HOSPITAL | Age: 63
End: 2018-06-25

## 2018-06-27 ENCOUNTER — APPOINTMENT (OUTPATIENT)
Dept: CARDIAC REHAB | Facility: HOSPITAL | Age: 63
End: 2018-06-27

## 2018-06-29 ENCOUNTER — APPOINTMENT (OUTPATIENT)
Dept: CARDIAC REHAB | Facility: HOSPITAL | Age: 63
End: 2018-06-29

## 2018-07-02 ENCOUNTER — APPOINTMENT (OUTPATIENT)
Dept: CARDIAC REHAB | Facility: HOSPITAL | Age: 63
End: 2018-07-02

## 2018-07-06 ENCOUNTER — APPOINTMENT (OUTPATIENT)
Dept: CARDIAC REHAB | Facility: HOSPITAL | Age: 63
End: 2018-07-06

## 2018-07-09 ENCOUNTER — APPOINTMENT (OUTPATIENT)
Dept: CARDIAC REHAB | Facility: HOSPITAL | Age: 63
End: 2018-07-09

## 2018-07-30 ENCOUNTER — HOSPITAL ENCOUNTER (OUTPATIENT)
Dept: CT IMAGING | Facility: HOSPITAL | Age: 63
Discharge: HOME | End: 2018-07-30
Admitting: NURSE PRACTITIONER

## 2018-07-30 DIAGNOSIS — J84.9 ILD (INTERSTITIAL LUNG DISEASE) (HCC): ICD-10-CM

## 2018-07-30 PROCEDURE — 71250 CT THORAX DX C-: CPT

## 2018-10-22 ENCOUNTER — TRANSCRIBE ORDERS (OUTPATIENT)
Dept: ADMINISTRATIVE | Facility: HOSPITAL | Age: 63
End: 2018-10-22

## 2018-10-22 DIAGNOSIS — Z12.31 VISIT FOR SCREENING MAMMOGRAM: Primary | ICD-10-CM

## 2018-10-22 RX ORDER — ERGOCALCIFEROL 1.25 MG/1
CAPSULE ORAL
Qty: 12 CAPSULE | Refills: 1 | Status: SHIPPED | OUTPATIENT
Start: 2018-10-22 | End: 2019-04-22 | Stop reason: SDUPTHER

## 2018-10-31 ENCOUNTER — TELEPHONE (OUTPATIENT)
Dept: INTERNAL MEDICINE | Facility: CLINIC | Age: 63
End: 2018-10-31

## 2018-10-31 DIAGNOSIS — Z00.00 ANNUAL PHYSICAL EXAM: Primary | ICD-10-CM

## 2018-10-31 DIAGNOSIS — Z79.899 MEDICATION MANAGEMENT: ICD-10-CM

## 2018-11-01 LAB
ALBUMIN SERPL-MCNC: 4.2 G/DL (ref 3.6–4.8)
ALBUMIN/GLOB SERPL: 1.4 {RATIO} (ref 1.2–2.2)
ALP SERPL-CCNC: 93 IU/L (ref 39–117)
ALT SERPL-CCNC: 28 IU/L (ref 0–32)
AST SERPL-CCNC: 27 IU/L (ref 0–40)
BASOPHILS # BLD AUTO: 0 X10E3/UL (ref 0–0.2)
BASOPHILS NFR BLD AUTO: 0 %
BILIRUB SERPL-MCNC: 0.4 MG/DL (ref 0–1.2)
BUN SERPL-MCNC: 14 MG/DL (ref 8–27)
BUN/CREAT SERPL: 12 (ref 12–28)
CALCIUM SERPL-MCNC: 9.3 MG/DL (ref 8.7–10.3)
CHLORIDE SERPL-SCNC: 103 MMOL/L (ref 96–106)
CHOLEST SERPL-MCNC: 203 MG/DL (ref 100–199)
CO2 SERPL-SCNC: 24 MMOL/L (ref 20–29)
CREAT SERPL-MCNC: 1.14 MG/DL (ref 0.57–1)
EOSINOPHIL # BLD AUTO: 0.2 X10E3/UL (ref 0–0.4)
EOSINOPHIL NFR BLD AUTO: 3 %
ERYTHROCYTE [DISTWIDTH] IN BLOOD BY AUTOMATED COUNT: 14.6 % (ref 12.3–15.4)
GLOBULIN SER CALC-MCNC: 2.9 G/DL (ref 1.5–4.5)
GLUCOSE SERPL-MCNC: 91 MG/DL (ref 65–99)
HCT VFR BLD AUTO: 39.9 % (ref 34–46.6)
HDLC SERPL-MCNC: 44 MG/DL
HGB BLD-MCNC: 13.4 G/DL (ref 11.1–15.9)
IMM GRANULOCYTES # BLD: 0 X10E3/UL (ref 0–0.1)
IMM GRANULOCYTES NFR BLD: 0 %
LDLC SERPL CALC-MCNC: 127 MG/DL (ref 0–99)
LYMPHOCYTES # BLD AUTO: 2.7 X10E3/UL (ref 0.7–3.1)
LYMPHOCYTES NFR BLD AUTO: 37 %
MCH RBC QN AUTO: 28.2 PG (ref 26.6–33)
MCHC RBC AUTO-ENTMCNC: 33.6 G/DL (ref 31.5–35.7)
MCV RBC AUTO: 84 FL (ref 79–97)
MONOCYTES # BLD AUTO: 0.6 X10E3/UL (ref 0.1–0.9)
MONOCYTES NFR BLD AUTO: 8 %
NEUTROPHILS # BLD AUTO: 3.6 X10E3/UL (ref 1.4–7)
NEUTROPHILS NFR BLD AUTO: 52 %
PLATELET # BLD AUTO: 240 X10E3/UL (ref 150–379)
POTASSIUM SERPL-SCNC: 5.1 MMOL/L (ref 3.5–5.2)
PROT SERPL-MCNC: 7.1 G/DL (ref 6–8.5)
RBC # BLD AUTO: 4.75 X10E6/UL (ref 3.77–5.28)
SODIUM SERPL-SCNC: 143 MMOL/L (ref 134–144)
TRIGL SERPL-MCNC: 159 MG/DL (ref 0–149)
VLDLC SERPL CALC-MCNC: 32 MG/DL (ref 5–40)
WBC # BLD AUTO: 7.1 X10E3/UL (ref 3.4–10.8)

## 2018-11-08 ENCOUNTER — OFFICE VISIT (OUTPATIENT)
Dept: INTERNAL MEDICINE | Facility: CLINIC | Age: 63
End: 2018-11-08

## 2018-11-08 VITALS
RESPIRATION RATE: 16 BRPM | HEIGHT: 65 IN | WEIGHT: 185 LBS | DIASTOLIC BLOOD PRESSURE: 62 MMHG | SYSTOLIC BLOOD PRESSURE: 132 MMHG | TEMPERATURE: 98.3 F | BODY MASS INDEX: 30.82 KG/M2

## 2018-11-08 DIAGNOSIS — Z00.00 WELCOME TO MEDICARE PREVENTIVE VISIT: ICD-10-CM

## 2018-11-08 DIAGNOSIS — I35.0 NONRHEUMATIC AORTIC VALVE STENOSIS: ICD-10-CM

## 2018-11-08 DIAGNOSIS — I47.1 PAT (PAROXYSMAL ATRIAL TACHYCARDIA) (HCC): ICD-10-CM

## 2018-11-08 DIAGNOSIS — Z23 NEED FOR IMMUNIZATION AGAINST INFLUENZA: Primary | ICD-10-CM

## 2018-11-08 DIAGNOSIS — Z95.2 S/P AVR (AORTIC VALVE REPLACEMENT): ICD-10-CM

## 2018-11-08 DIAGNOSIS — G43.009 MIGRAINE WITHOUT AURA AND WITHOUT STATUS MIGRAINOSUS, NOT INTRACTABLE: ICD-10-CM

## 2018-11-08 DIAGNOSIS — R73.02 IGT (IMPAIRED GLUCOSE TOLERANCE): ICD-10-CM

## 2018-11-08 PROCEDURE — G0008 ADMIN INFLUENZA VIRUS VAC: HCPCS | Performed by: INTERNAL MEDICINE

## 2018-11-08 PROCEDURE — G0403 EKG FOR INITIAL PREVENT EXAM: HCPCS | Performed by: INTERNAL MEDICINE

## 2018-11-08 PROCEDURE — 90674 CCIIV4 VAC NO PRSV 0.5 ML IM: CPT | Performed by: INTERNAL MEDICINE

## 2018-11-08 PROCEDURE — 99214 OFFICE O/P EST MOD 30 MIN: CPT | Performed by: INTERNAL MEDICINE

## 2018-11-08 PROCEDURE — G0402 INITIAL PREVENTIVE EXAM: HCPCS | Performed by: INTERNAL MEDICINE

## 2018-11-08 NOTE — PROGRESS NOTES
Subjective   Chastity Berrios is a 62 y.o. female.     Chief Complaint   Patient presents with   • Hyperglycemia   • Aortic Stenosis         In for recheck of aortic stenosis.  An aortic valve replacement 2/20/2018.  She is completely asymptomatic.  No angina.  No congestive heart failure.  No syncope or presyncope.  Got a porcine aVR.  No history of recent palpitations or PSVT.      Hyperglycemia   This is a chronic problem. The current episode started more than 1 year ago. The problem occurs constantly. The problem has been unchanged. Pertinent negatives include no abdominal pain, chest pain, chills, coughing, fatigue, fever, nausea or vomiting. Nothing aggravates the symptoms. The treatment provided no relief.        The following portions of the patient's history were reviewed and updated as appropriate: allergies, current medications, past social history and problem list.    Outpatient Prescriptions Marked as Taking for the 11/8/18 encounter (Office Visit) with Alvaro mSall MD   Medication Sig Dispense Refill   • amoxicillin (AMOXIL) 500 MG capsule TAKE ALL PILLS 1 HOUR PRIOR TO DENTAL PROCEDURE 4 capsule 0   • aspirin 81 MG EC tablet Take 1 tablet by mouth Daily. 60 tablet 1   • calcipotriene-betamethasone (TACLONEX) 0.005-0.064 % external suspension Apply  topically Daily.     • fluticasone (FLONASE) 50 MCG/ACT nasal spray 2 sprays into each nostril Daily As Needed.     • loratadine (CLARITIN) 10 MG tablet Take 10 mg by mouth Daily.     • metoprolol succinate XL (TOPROL XL) 25 MG 24 hr tablet Take 1 tablet by mouth Daily. 30 tablet 11   • Multiple Vitamins-Minerals (MULTIVITAMIN ADULTS PO) Take 1 tablet by mouth Daily.     • vitamin D (ERGOCALCIFEROL) 01648 units capsule capsule TAKE ONE CAPSULE BY MOUTH ONCE WEEKLY 12 capsule 1       Review of Systems   Constitutional: Negative for chills, fatigue and fever.   Respiratory: Negative for cough, shortness of breath and wheezing.    Cardiovascular: Negative for  chest pain, palpitations and leg swelling.   Gastrointestinal: Negative for abdominal pain, constipation, diarrhea, nausea and vomiting.       Objective   Vitals:    11/08/18 1346   BP: 132/62   Resp: 16   Temp: 98.3 °F (36.8 °C)      1    11/08/18  1346   Weight: 83.9 kg (185 lb)    [unfilled]  Body mass index is 30.79 kg/m².      Physical Exam   Constitutional: She appears well-developed and well-nourished. No distress.   HENT:   Head: Normocephalic and atraumatic.   Neck: Carotid bruit is not present. No thyromegaly present.   Cardiovascular: Normal rate, regular rhythm and intact distal pulses.  Exam reveals no gallop.    Murmur heard.   Crescendo decrescendo systolic murmur is present with a grade of 2/6   Short grade 2/6 FLOR at the aortic area   Pulmonary/Chest: Effort normal and breath sounds normal. No respiratory distress. She has no wheezes. She has no rales.   Abdominal: Soft. Bowel sounds are normal. She exhibits no mass. There is no tenderness. There is no guarding.         Problem List Items Addressed This Visit        Cardiovascular and Mediastinum    PAT (paroxysmal atrial tachycardia) (CMS/HCC)    Migraine without aura and without status migrainosus, not intractable    Aortic valve stenosis       Endocrine    IGT (impaired glucose tolerance)       Other    S/P AVR (aortic valve replacement)      Other Visit Diagnoses     Need for immunization against influenza    -  Primary    Relevant Orders    Flucelvax Quad=>4Years (PFS) (Completed)        Assessment/Plan   In for recheck of AVR and IGT.  History of PSVT.  History of Paget's disease.  Overall health is doing very well.  She's done great since he aVR.  No cardiac symptoms.  She got annual lab work today October 2018 with CBC, CMP, lipids.  Labs look great.  Lipids remain borderline.  She had actually no evidence of CAD.  No recent consider treating her lipids.  She got her annual wellness visit today as well.  She has a mammogram upcoming.   We'll follow-up one year.  Discussed calcium intake today and suggested she avoid he given her prosthetic valve.  The utility for treating osteoporosis is minimal if any benefit at all.           .[email or web address]  Dragon disclaimer:   Much of this encounter note is an electronic transcription/translation of spoken language to printed text. The electronic translation of spoken language may permit erroneous, or at times, nonsensical words or phrases to be inadvertently transcribed; Although I have reviewed the note for such errors, some may still exist.

## 2018-11-08 NOTE — PATIENT INSTRUCTIONS
Exercising to Stay Healthy  Exercising regularly is important. It has many health benefits, such as:  · Improving your overall fitness, flexibility, and endurance.  · Increasing your bone density.  · Helping with weight control.  · Decreasing your body fat.  · Increasing your muscle strength.  · Reducing stress and tension.  · Improving your overall health.    In order to become healthy and stay healthy, it is recommended that you do moderate-intensity and vigorous-intensity exercise. You can tell that you are exercising at a moderate intensity if you have a higher heart rate and faster breathing, but you are still able to hold a conversation. You can tell that you are exercising at a vigorous intensity if you are breathing much harder and faster and cannot hold a conversation while exercising.  How often should I exercise?  Choose an activity that you enjoy and set realistic goals. Your health care provider can help you to make an activity plan that works for you. Exercise regularly as directed by your health care provider. This may include:  · Doing resistance training twice each week, such as:  ? Push-ups.  ? Sit-ups.  ? Lifting weights.  ? Using resistance bands.  · Doing a given intensity of exercise for a given amount of time. Choose from these options:  ? 150 minutes of moderate-intensity exercise every week.  ? 75 minutes of vigorous-intensity exercise every week.  ? A mix of moderate-intensity and vigorous-intensity exercise every week.    Children, pregnant women, people who are out of shape, people who are overweight, and older adults may need to consult a health care provider for individual recommendations. If you have any sort of medical condition, be sure to consult your health care provider before starting a new exercise program.  What are some exercise ideas?  Some moderate-intensity exercise ideas include:  · Walking at a rate of 1 mile in 15  minutes.  · Biking.  · Hiking.  · Golfing.  · Dancing.    Some vigorous-intensity exercise ideas include:  · Walking at a rate of at least 4.5 miles per hour.  · Jogging or running at a rate of 5 miles per hour.  · Biking at a rate of at least 10 miles per hour.  · Lap swimming.  · Roller-skating or in-line skating.  · Cross-country skiing.  · Vigorous competitive sports, such as football, basketball, and soccer.  · Jumping rope.  · Aerobic dancing.    What are some everyday activities that can help me to get exercise?  · Yard work, such as:  ? Pushing a .  ? Raking and bagging leaves.  · Washing and waxing your car.  · Pushing a stroller.  · Shoveling snow.  · Gardening.  · Washing windows or floors.  How can I be more active in my day-to-day activities?  · Use the stairs instead of the elevator.  · Take a walk during your lunch break.  · If you drive, park your car farther away from work or school.  · If you take public transportation, get off one stop early and walk the rest of the way.  · Make all of your phone calls while standing up and walking around.  · Get up, stretch, and walk around every 30 minutes throughout the day.  What guidelines should I follow while exercising?  · Do not exercise so much that you hurt yourself, feel dizzy, or get very short of breath.  · Consult your health care provider before starting a new exercise program.  · Wear comfortable clothes and shoes with good support.  · Drink plenty of water while you exercise to prevent dehydration or heat stroke. Body water is lost during exercise and must be replaced.  · Work out until you breathe faster and your heart beats faster.  This information is not intended to replace advice given to you by your health care provider. Make sure you discuss any questions you have with your health care provider.  Document Released: 01/20/2012 Document Revised: 05/25/2017 Document Reviewed: 05/21/2015  [employer] Interactive Patient Education © 2018  [employer]  Calorie Counting for Weight Loss  Calories are units of energy. Your body needs a certain amount of calories from food to keep you going throughout the day. When you eat more calories than your body needs, your body stores the extra calories as fat. When you eat fewer calories than your body needs, your body burns fat to get the energy it needs.  Calorie counting means keeping track of how many calories you eat and drink each day. Calorie counting can be helpful if you need to lose weight. If you make sure to eat fewer calories than your body needs, you should lose weight. Ask your health care provider what a healthy weight is for you.  For calorie counting to work, you will need to eat the right number of calories in a day in order to lose a healthy amount of weight per week. A dietitian can help you determine how many calories you need in a day and will give you suggestions on how to reach your calorie goal.  · A healthy amount of weight to lose per week is usually 1-2 lb (0.5-0.9 kg). This usually means that your daily calorie intake should be reduced by 500-750 calories.  · Eating 1,200 - 1,500 calories per day can help most women lose weight.  · Eating 1,500 - 1,800 calories per day can help most men lose weight.    What do I need to know about calorie counting?  In order to meet your daily calorie goal, you will need to:  · Find out how many calories are in each food you would like to eat. Try to do this before you eat.  · Decide how much of the food you plan to eat.  · Write down what you ate and how many calories it had. Doing this is called keeping a food log.    To successfully lose weight, it is important to balance calorie counting with a healthy lifestyle that includes regular activity. Aim for 150 minutes of moderate exercise (such as walking) or 75 minutes of vigorous exercise (such as running) each week.  Where do I find calorie information?    The number of calories in a food can be  found on a Nutrition Facts label. If a food does not have a Nutrition Facts label, try to look up the calories online or ask your dietitian for help.  Remember that calories are listed per serving. If you choose to have more than one serving of a food, you will have to multiply the calories per serving by the amount of servings you plan to eat. For example, the label on a package of bread might say that a serving size is 1 slice and that there are 90 calories in a serving. If you eat 1 slice, you will have eaten 90 calories. If you eat 2 slices, you will have eaten 180 calories.  How do I keep a food log?  Immediately after each meal, record the following information in your food log:  · What you ate. Don't forget to include toppings, sauces, and other extras on the food.  · How much you ate. This can be measured in cups, ounces, or number of items.  · How many calories each food and drink had.  · The total number of calories in the meal.    Keep your food log near you, such as in a small notebook in your pocket, or use a mobile vicky or website. Some programs will calculate calories for you and show you how many calories you have left for the day to meet your goal.  What are some calorie counting tips?  · Use your calories on foods and drinks that will fill you up and not leave you hungry:  ? Some examples of foods that fill you up are nuts and nut butters, vegetables, lean proteins, and high-fiber foods like whole grains. High-fiber foods are foods with more than 5 g fiber per serving.  ? Drinks such as sodas, specialty coffee drinks, alcohol, and juices have a lot of calories, yet do not fill you up.  · Eat nutritious foods and avoid empty calories. Empty calories are calories you get from foods or beverages that do not have many vitamins or protein, such as candy, sweets, and soda. It is better to have a nutritious high-calorie food (such as an avocado) than a food with few nutrients (such as a bag of  "chips).  · Know how many calories are in the foods you eat most often. This will help you calculate calorie counts faster.  · Pay attention to calories in drinks. Low-calorie drinks include water and unsweetened drinks.  · Pay attention to nutrition labels for \"low fat\" or \"fat free\" foods. These foods sometimes have the same amount of calories or more calories than the full fat versions. They also often have added sugar, starch, or salt, to make up for flavor that was removed with the fat.  · Find a way of tracking calories that works for you. Get creative. Try different apps or programs if writing down calories does not work for you.  What are some portion control tips?  · Know how many calories are in a serving. This will help you know how many servings of a certain food you can have.  · Use a measuring cup to measure serving sizes. You could also try weighing out portions on a kitchen scale. With time, you will be able to estimate serving sizes for some foods.  · Take some time to put servings of different foods on your favorite plates, bowls, and cups so you know what a serving looks like.  · Try not to eat straight from a bag or box. Doing this can lead to overeating. Put the amount you would like to eat in a cup or on a plate to make sure you are eating the right portion.  · Use smaller plates, glasses, and bowls to prevent overeating.  · Try not to multitask (for example, watch TV or use your computer) while eating. If it is time to eat, sit down at a table and enjoy your food. This will help you to know when you are full. It will also help you to be aware of what you are eating and how much you are eating.  What are tips for following this plan?  Reading food labels  · Check the calorie count compared to the serving size. The serving size may be smaller than what you are used to eating.  · Check the source of the calories. Make sure the food you are eating is high in vitamins and protein and low in " saturated and trans fats.  Shopping  · Read nutrition labels while you shop. This will help you make healthy decisions before you decide to purchase your food.  · Make a grocery list and stick to it.  Cooking  · Try to cook your favorite foods in a healthier way. For example, try baking instead of frying.  · Use low-fat dairy products.  Meal planning  · Use more fruits and vegetables. Half of your plate should be fruits and vegetables.  · Include lean proteins like poultry and fish.  How do I count calories when eating out?  · Ask for smaller portion sizes.  · Consider sharing an entree and sides instead of getting your own entree.  · If you get your own entree, eat only half. Ask for a box at the beginning of your meal and put the rest of your entree in it so you are not tempted to eat it.  · If calories are listed on the menu, choose the lower calorie options.  · Choose dishes that include vegetables, fruits, whole grains, low-fat dairy products, and lean protein.  · Choose items that are boiled, broiled, grilled, or steamed. Stay away from items that are buttered, battered, fried, or served with cream sauce. Items labeled “crispy” are usually fried, unless stated otherwise.  · Choose water, low-fat milk, unsweetened iced tea, or other drinks without added sugar. If you want an alcoholic beverage, choose a lower calorie option such as a glass of wine or light beer.  · Ask for dressings, sauces, and syrups on the side. These are usually high in calories, so you should limit the amount you eat.  · If you want a salad, choose a garden salad and ask for grilled meats. Avoid extra toppings like haas, cheese, or fried items. Ask for the dressing on the side, or ask for olive oil and vinegar or lemon to use as dressing.  · Estimate how many servings of a food you are given. For example, a serving of cooked rice is ½ cup or about the size of half a baseball. Knowing serving sizes will help you be aware of how much food  you are eating at restaurants. The list below tells you how big or small some common portion sizes are based on everyday objects:  ? 1 oz--4 stacked dice.  ? 3 oz--1 deck of cards.  ? 1 tsp--1 die.  ? 1 Tbsp--½ a ping-pong ball.  ? 2 Tbsp--1 ping-pong ball.  ? ½ cup--½ baseball.  ? 1 cup--1 baseball.  Summary  · Calorie counting means keeping track of how many calories you eat and drink each day. If you eat fewer calories than your body needs, you should lose weight.  · A healthy amount of weight to lose per week is usually 1-2 lb (0.5-0.9 kg). This usually means reducing your daily calorie intake by 500-750 calories.  · The number of calories in a food can be found on a Nutrition Facts label. If a food does not have a Nutrition Facts label, try to look up the calories online or ask your dietitian for help.  · Use your calories on foods and drinks that will fill you up, and not on foods and drinks that will leave you hungry.  · Use smaller plates, glasses, and bowls to prevent overeating.  This information is not intended to replace advice given to you by your health care provider. Make sure you discuss any questions you have with your health care provider.  Document Released: 12/18/2006 Document Revised: 11/17/2017 Document Reviewed: 11/17/2017  [employer] Patient Education © 2018 [employer]  BMI for Adults  Body mass index (BMI) is a number that is calculated from a person's weight and height. In most adults, the number is used to find how much of an adult's weight is made up of fat. BMI is not as accurate as a direct measure of body fat.  How is BMI calculated?  BMI is calculated by dividing weight in kilograms by height in meters squared. It can also be calculated by dividing weight in pounds by height in inches squared, then multiplying the resulting number by 703. Charts are available to help you find your BMI quickly and easily without doing this calculation.  How is BMI interpreted?  Health care  professionals use BMI charts to identify whether an adult is underweight, at a normal weight, or overweight based on the following guidelines:  · Underweight: BMI less than 18.5.  · Normal weight: BMI between 18.5 and 24.9.  · Overweight: BMI between 25 and 29.9.  · Obese: BMI of 30 and above.    BMI is usually interpreted the same for males and females.  Weight includes both fat and muscle, so someone with a muscular build, such as an athlete, may have a BMI that is higher than 24.9. In cases like these, BMI may not accurately depict body fat. To determine if excess body fat is the cause of a BMI of 25 or higher, further assessments may need to be done by a health care provider.  Why is BMI a useful tool?  BMI is used to identify a possible weight problem that may be related to a medical problem or may increase the risk for medical problems. BMI can also be used to promote changes to reach a healthy weight.  This information is not intended to replace advice given to you by your health care provider. Make sure you discuss any questions you have with your health care provider.  Document Released: 08/29/2005 Document Revised: 04/27/2017 Document Reviewed: 05/15/2015  [employer] Interactive Patient Education © 2018 [employer]  Influenza Virus Vaccine injection  What is this medicine?  INFLUENZA VIRUS VACCINE (in [facility] SEEN) helps to reduce the risk of getting influenza also known as the flu. The vaccine only helps protect you against some strains of the flu.  This medicine may be used for other purposes; ask your health care provider or pharmacist if you have questions.  COMMON BRAND NAME(S): Afluria, Agriflu, Alfuria, FLUAD, Fluarix, Fluarix Quadrivalent, Flublok, Flublok Quadrivalent, FLUCELVAX, Flulaval, Fluvirin, Fluzone, Fluzone High-Dose, Fluzone Intradermal  What should I tell my health care provider before I take this medicine?  They need to know if you have any of these  conditions:  -bleeding disorder like hemophilia  -fever or infection  -Guillain-[place] syndrome or other neurological problems  -immune system problems  -infection with the human immunodeficiency virus (HIV) or AIDS  -low blood platelet counts  -multiple sclerosis  -an unusual or allergic reaction to influenza virus vaccine, latex, other medicines, foods, dyes, or preservatives. Different brands of vaccines contain different allergens. Some may contain latex or eggs. Talk to your doctor about your allergies to make sure that you get the right vaccine.  -pregnant or trying to get pregnant  -breast-feeding  How should I use this medicine?  This vaccine is for injection into a muscle or under the skin. It is given by a health care professional.  A copy of Vaccine Information Statements will be given before each vaccination. Read this sheet carefully each time. The sheet may change frequently.  Talk to your healthcare provider to see which vaccines are right for you. Some vaccines should not be used in all age groups.  Overdosage: If you think you have taken too much of this medicine contact a poison control center or emergency room at once.  NOTE: This medicine is only for you. Do not share this medicine with others.  What if I miss a dose?  This does not apply.  What may interact with this medicine?  -chemotherapy or radiation therapy  -medicines that lower your immune system like etanercept, anakinra, infliximab, and adalimumab  -medicines that treat or prevent blood clots like warfarin  -phenytoin  -steroid medicines like prednisone or cortisone  -theophylline  -vaccines  This list may not describe all possible interactions. Give your health care provider a list of all the medicines, herbs, non-prescription drugs, or dietary supplements you use. Also tell them if you smoke, drink alcohol, or use illegal drugs. Some items may interact with your medicine.  What should I watch for while using this medicine?  Report any  side effects that do not go away within 3 days to your doctor or health care professional. Call your health care provider if any unusual symptoms occur within 6 weeks of receiving this vaccine.  You may still catch the flu, but the illness is not usually as bad. You cannot get the flu from the vaccine. The vaccine will not protect against colds or other illnesses that may cause fever. The vaccine is needed every year.  What side effects may I notice from receiving this medicine?  Side effects that you should report to your doctor or health care professional as soon as possible:  -allergic reactions like skin rash, itching or hives, swelling of the face, lips, or tongue  Side effects that usually do not require medical attention (report to your doctor or health care professional if they continue or are bothersome):  -fever  -headache  -muscle aches and pains  -pain, tenderness, redness, or swelling at the injection site  -tiredness  This list may not describe all possible side effects. Call your doctor for medical advice about side effects. You may report side effects to FDA at 1-277-FDA-2604.  Where should I keep my medicine?  The vaccine will be given by a health care professional in a clinic, pharmacy, doctor's office, or other health care setting. You will not be given vaccine doses to store at home.  NOTE: This sheet is a summary. It may not cover all possible information. If you have questions about this medicine, talk to your doctor, pharmacist, or health care provider.  © 2018 [employer] (2016-07-08 10:07:28)

## 2018-11-08 NOTE — PROGRESS NOTES
QUICK REFERENCE INFORMATION:  The ABCs of the Annual Wellness Visit    Initial Medicare Wellness Visit    HEALTH RISK ASSESSMENT    1955    Recent Hospitalizations:  Recently treated at the following:  [facility].        Current Medical Providers:  Patient Care Team:  Alvaro Small MD as PCP - General (Internal Medicine)  Alvaro Small MD as PCP - Internal Medicine  Jose Bonner MD as Consulting Physician (Cardiology)  Fabián Morales MD as Consulting Physician (Cardiology)        Smoking Status:  History   Smoking Status   • Never Smoker   Smokeless Tobacco   • Never Used       Alcohol Consumption:  History   Alcohol Use   • 0.6 oz/week   • 1 Glasses of wine per week     Comment: social / caffeine use       Depression Screen:   PHQ-2/PHQ-9 Depression Screening 11/8/2018   Little interest or pleasure in doing things 0   Feeling down, depressed, or hopeless 0   Trouble falling or staying asleep, or sleeping too much 1   Feeling tired or having little energy 0   Poor appetite or overeating 0   Feeling bad about yourself - or that you are a failure or have let yourself or your family down 0   Trouble concentrating on things, such as reading the newspaper or watching television 0   Moving or speaking so slowly that other people could have noticed. Or the opposite - being so fidgety or restless that you have been moving around a lot more than usual 0   Thoughts that you would be better off dead, or of hurting yourself in some way 0   Total Score 1   If you checked off any problems, how difficult have these problems made it for you to do your work, take care of things at home, or get along with other people? Not difficult at all       Health Habits and Functional and Cognitive Screening:  Functional & Cognitive Status 11/8/2018   Do you have difficulty preparing food and eating? No   Do you have difficulty bathing yourself, getting dressed or grooming yourself? No   Do you have  difficulty using the toilet? No   Do you have difficulty moving around from place to place? No   Do you have trouble with steps or getting out of a bed or a chair? No   In the past year have you fallen or experienced a near fall? No   Current Diet Well Balanced Diet   Dental Exam Up to date   Eye Exam Up to date   Exercise (times per week) 3 times per week   Current Exercise Activities Include Aerobics   Do you need help using the phone?  No   Are you deaf or do you have serious difficulty hearing?  No   Do you need help with transportation? No   Do you need help shopping? No   Do you need help preparing meals?  No   Do you need help with housework?  No   Do you need help with laundry? No   Do you need help taking your medications? No   Do you need help managing money? No   Do you ever drive or ride in a car without wearing a seat belt? No   Have you felt unusual stress, anger or loneliness in the last month? No   Who do you live with? Spouse   If you need help, do you have trouble finding someone available to you? No   Have you been bothered in the last four weeks by sexual problems? No   Do you have difficulty concentrating, remembering or making decisions? No           Does the patient have evidence of cognitive impairment? No    Asiprin use counseling: Taking ASA appropriately as indicated      Recent Lab Results:    Visual Acuity:  No exam data present    Age-appropriate Screening Schedule:  Refer to the list below for future screening recommendations based on patient's age, sex and/or medical conditions. Orders for these recommended tests are listed in the plan section. The patient has been provided with a written plan.    Health Maintenance   Topic Date Due   • ZOSTER VACCINE (1 of 2) 11/22/2003   • INFLUENZA VACCINE  08/01/2018   • COLONOSCOPY  04/01/2019   • LIPID PANEL  10/31/2019   • MAMMOGRAM  11/27/2019   • DXA SCAN  11/28/2019   • TDAP/TD VACCINES (2 - Td) 05/16/2021   • PAP SMEAR  Excluded         Subjective   History of Present Illness    Chastity Berrios is a 62 y.o. female who presents for an Annual Wellness Visit.    The following portions of the patient's history were reviewed and updated as appropriate: allergies, current medications, past family history, past medical history, past social history, past surgical history and problem list.    Outpatient Medications Prior to Visit   Medication Sig Dispense Refill   • amoxicillin (AMOXIL) 500 MG capsule TAKE ALL PILLS 1 HOUR PRIOR TO DENTAL PROCEDURE 4 capsule 0   • aspirin 81 MG EC tablet Take 1 tablet by mouth Daily. 60 tablet 1   • calcipotriene-betamethasone (TACLONEX) 0.005-0.064 % external suspension Apply  topically Daily.     • fluticasone (FLONASE) 50 MCG/ACT nasal spray 2 sprays into each nostril Daily As Needed.     • loratadine (CLARITIN) 10 MG tablet Take 10 mg by mouth Daily.     • metoprolol succinate XL (TOPROL XL) 25 MG 24 hr tablet Take 1 tablet by mouth Daily. 30 tablet 11   • Multiple Vitamins-Minerals (MULTIVITAMIN ADULTS PO) Take 1 tablet by mouth Daily.     • vitamin D (ERGOCALCIFEROL) 86783 units capsule capsule TAKE ONE CAPSULE BY MOUTH ONCE WEEKLY 12 capsule 1     Facility-Administered Medications Prior to Visit   Medication Dose Route Frequency Provider Last Rate Last Dose   • Chlorhexidine Gluconate Cloth 2 % pads 1 application  1 application Topical Q12H PRN Kaylah Prakash APRN           Patient Active Problem List   Diagnosis   • Psoriasis   • Paget's bone disease   • Perennial allergic rhinitis   • PAT (paroxysmal atrial tachycardia) (CMS/HCC)   • Migraine without aura and without status migrainosus, not intractable   • IGT (impaired glucose tolerance)   • Osteopenia   • Vitamin D deficiency   • Nonrheumatic aortic valve stenosis   • Aortic stenosis   • Aortic valve stenosis   • Left carotid stenosis   • S/P AVR (aortic valve replacement)       Advance Care Planning:  has NO advance directive - information provided to the  "patient today    Identification of Risk Factors:  Risk factors include: weight .    Review of Systems    Compared to one year ago, the patient feels her physical health is better.  Compared to one year ago, the patient feels her mental health is better.    Objective     Physical Exam    Vitals:    11/08/18 1346   BP: 132/62   Resp: 16   Temp: 98.3 °F (36.8 °C)   TempSrc: Oral   Weight: 83.9 kg (185 lb)   Height: 165.1 cm (65\")       Patient's Body mass index is 30.79 kg/m². BMI is above normal parameters. Recommendations include: exercise counseling and nutrition counseling.      Assessment/Plan   Patient Self-Management and Personalized Health Advice  The patient has been provided with information about: exercise and weight management and preventive services including:   · Exercise counseling provided, Fall Risk assessment done, Nutrition counseling provided.    Visit Diagnoses:  No diagnosis found.    No orders of the defined types were placed in this encounter.      Outpatient Encounter Prescriptions as of 11/8/2018   Medication Sig Dispense Refill   • amoxicillin (AMOXIL) 500 MG capsule TAKE ALL PILLS 1 HOUR PRIOR TO DENTAL PROCEDURE 4 capsule 0   • aspirin 81 MG EC tablet Take 1 tablet by mouth Daily. 60 tablet 1   • calcipotriene-betamethasone (TACLONEX) 0.005-0.064 % external suspension Apply  topically Daily.     • fluticasone (FLONASE) 50 MCG/ACT nasal spray 2 sprays into each nostril Daily As Needed.     • loratadine (CLARITIN) 10 MG tablet Take 10 mg by mouth Daily.     • metoprolol succinate XL (TOPROL XL) 25 MG 24 hr tablet Take 1 tablet by mouth Daily. 30 tablet 11   • Multiple Vitamins-Minerals (MULTIVITAMIN ADULTS PO) Take 1 tablet by mouth Daily.     • vitamin D (ERGOCALCIFEROL) 00418 units capsule capsule TAKE ONE CAPSULE BY MOUTH ONCE WEEKLY 12 capsule 1     Facility-Administered Encounter Medications as of 11/8/2018   Medication Dose Route Frequency Provider Last Rate Last Dose   • Chlorhexidine " Gluconate Cloth 2 % pads 1 application  1 application Topical Q12H PRN Kaylah Prakash APRN           Reviewed use of high risk medication in the elderly: yes  Reviewed for potential of harmful drug interactions in the elderly: yes      ECG 12 Lead  Date/Time: 11/8/2018 4:54 PM  Performed by: JASPREET RAMIREZ  Authorized by: JASPREET RAMIREZ   Comparison: compared with previous ECG from 3/9/2018  Similar to previous ECG  Rhythm: sinus rhythm  Rate: normal  Conduction: conduction normal  ST Segments: ST segments normal  T Waves: T waves normal  QRS axis: normal  Other: no other findings  Clinical impression: normal ECG  Comments: Normal sinus rhythm.  Heart rate is 80.  This is a normal EKG.  There is no change from the prior EKG dated 3/9/2018.              Follow Up:  No Follow-up on file.     An After Visit Summary and PPPS with all of these plans were given to the patient.

## 2018-11-09 ENCOUNTER — OFFICE VISIT (OUTPATIENT)
Dept: CARDIOLOGY | Facility: CLINIC | Age: 63
End: 2018-11-09

## 2018-11-09 VITALS
HEART RATE: 81 BPM | DIASTOLIC BLOOD PRESSURE: 80 MMHG | OXYGEN SATURATION: 97 % | SYSTOLIC BLOOD PRESSURE: 138 MMHG | HEIGHT: 65 IN | BODY MASS INDEX: 30.59 KG/M2 | WEIGHT: 183.6 LBS

## 2018-11-09 DIAGNOSIS — T82.857A STENOSIS OF PROSTHETIC AORTIC VALVE: Primary | ICD-10-CM

## 2018-11-09 PROCEDURE — 99213 OFFICE O/P EST LOW 20 MIN: CPT | Performed by: INTERNAL MEDICINE

## 2018-11-09 NOTE — PROGRESS NOTES
Subjective:     Encounter Date:11/09/2018      Patient ID: Chastity Berrios is a 62 y.o. female.    Chief Complaint: Prosthetic aortic valve.  History of Present Illness    62-year-old female presents today for reevaluation.  She is doing significantly better than she was this time last year.  Patient says she's feeling great no limitations of activities.  No chest pain shortness of breath palpitations lightheadedness swelling or fatigue.    Review of Systems   All other systems reviewed and are negative.      Procedures       Objective:     Physical Exam   Constitutional: She is oriented to person, place, and time. She appears well-developed.   HENT:   Head: Normocephalic.   Eyes: Conjunctivae are normal.   Neck: Normal range of motion.   Cardiovascular: Normal rate and regular rhythm.    Murmur heard.   Harsh midsystolic murmur is present with a grade of 1/6  at the upper right sternal border radiating to the neck  Pulmonary/Chest: Breath sounds normal.   Abdominal: Soft. Bowel sounds are normal.   Musculoskeletal: Normal range of motion. She exhibits no edema.   Neurological: She is alert and oriented to person, place, and time.   Skin: Skin is warm and dry.   Psychiatric: She has a normal mood and affect. Her behavior is normal.   Vitals reviewed.      Lab Review:       Assessment:          Diagnosis Plan   1. Stenosis of prosthetic aortic valve  Adult Transthoracic Echo Complete W/ Cont if Necessary Per Protocol          Plan:       1.  Prosthetic aortic valve.  Patient did have some increased gradients on her initial echo postoperatively.  I'm when a repeat her echo next year which abuse about 2 years since her surgery.  Clinically she is doing great her murmur is one out of 6.  2.  SVT status post ablation resolved  3.  Blood pressures good  4.  History of carotid stenosis mild.    5. Follow-up one year sooner if issues

## 2018-11-28 ENCOUNTER — HOSPITAL ENCOUNTER (OUTPATIENT)
Dept: MAMMOGRAPHY | Facility: HOSPITAL | Age: 63
Discharge: HOME | End: 2018-11-28
Attending: INTERNAL MEDICINE | Admitting: INTERNAL MEDICINE

## 2018-11-28 DIAGNOSIS — Z12.31 VISIT FOR SCREENING MAMMOGRAM: ICD-10-CM

## 2018-11-28 PROCEDURE — 77067 SCR MAMMO BI INCL CAD: CPT

## 2019-02-19 ENCOUNTER — OFFICE VISIT (OUTPATIENT)
Dept: INTERNAL MEDICINE | Facility: CLINIC | Age: 64
End: 2019-02-19

## 2019-02-19 VITALS
HEART RATE: 89 BPM | OXYGEN SATURATION: 91 % | HEIGHT: 65 IN | DIASTOLIC BLOOD PRESSURE: 72 MMHG | SYSTOLIC BLOOD PRESSURE: 146 MMHG | RESPIRATION RATE: 16 BRPM | TEMPERATURE: 97.6 F | BODY MASS INDEX: 30.55 KG/M2

## 2019-02-19 DIAGNOSIS — K52.9 GASTROENTERITIS: Primary | ICD-10-CM

## 2019-02-19 PROCEDURE — 90670 PCV13 VACCINE IM: CPT | Performed by: INTERNAL MEDICINE

## 2019-02-19 PROCEDURE — G0009 ADMIN PNEUMOCOCCAL VACCINE: HCPCS | Performed by: INTERNAL MEDICINE

## 2019-02-19 PROCEDURE — 99213 OFFICE O/P EST LOW 20 MIN: CPT | Performed by: INTERNAL MEDICINE

## 2019-02-19 RX ORDER — ATORVASTATIN CALCIUM 20 MG/1
20 TABLET, FILM COATED ORAL DAILY
Qty: 90 TABLET | Refills: 3 | Status: SHIPPED | OUTPATIENT
Start: 2019-02-19 | End: 2019-11-11

## 2019-02-19 NOTE — PATIENT INSTRUCTIONS
Pneumococcal Conjugate Vaccine (PCV13) What You Need to Know  1. Why get vaccinated?  Vaccination can protect both children and adults from pneumococcal disease.  Pneumococcal disease is caused by bacteria that can spread from person to person through close contact. It can cause ear infections, and it can also lead to more serious infections of the:  · Lungs (pneumonia),  · Blood (bacteremia), and  · Covering of the brain and spinal cord (meningitis).    Pneumococcal pneumonia is most common among adults. Pneumococcal meningitis can cause deafness and brain damage, and it kills about 1 child in 10 who get it.  Anyone can get pneumococcal disease, but children under 2 years of age and adults 65 years and older, people with certain medical conditions, and cigarette smokers are at the highest risk.  Before there was a vaccine, the [place] saw:  · more than 700 cases of meningitis,  · about 13,000 blood infections,  · about 5 million ear infections, and  · about 200 deaths    in children under 5 each year from pneumococcal disease. Since vaccine became available, severe pneumococcal disease in these children has fallen by 88%.  About 18,000 older adults die of pneumococcal disease each year in the [place].  Treatment of pneumococcal infections with penicillin and other drugs is not as effective as it used to be, because some strains of the disease have become resistant to these drugs. This makes prevention of the disease, through vaccination, even more important.  2. PCV13 vaccine  Pneumococcal conjugate vaccine (called PCV13) protects against 13 types of pneumococcal bacteria.  PCV13 is routinely given to children at 2, 4, 6, and 12-15 months of age. It is also recommended for children and adults 2 to 64 years of age with certain health conditions, and for all adults 65 years of age and older. Your doctor can give you details.  3. Some people should not get this vaccine  Anyone who has ever had a  life-threatening allergic reaction to a dose of this vaccine, to an earlier pneumococcal vaccine called PCV7, or to any vaccine containing diphtheria toxoid (for example, DTaP), should not get PCV13.  Anyone with a severe allergy to any component of PCV13 should not get the vaccine. Tell your doctor if the person being vaccinated has any severe allergies.  If the person scheduled for vaccination is not feeling well, your healthcare provider might decide to reschedule the shot on another day.  4. Risks of a vaccine reaction  With any medicine, including vaccines, there is a chance of reactions. These are usually mild and go away on their own, but serious reactions are also possible.  Problems reported following PCV13 varied by age and dose in the series. The most common problems reported among children were:  · About half became drowsy after the shot, had a temporary loss of appetite, or had redness or tenderness where the shot was given.  · About 1 out of 3 had swelling where the shot was given.  · About 1 out of 3 had a mild fever, and about 1 in 20 had a fever over 102.2°F.  · Up to about 8 out of 10 became fussy or irritable.    Adults have reported pain, redness, and swelling where the shot was given; also mild fever, fatigue, headache, chills, or muscle pain.  Young children who get PCV13 along with inactivated flu vaccine at the same time may be at increased risk for seizures caused by fever. Ask your doctor for more information.  Problems that could happen after any vaccine:  · People sometimes faint after a medical procedure, including vaccination. Sitting or lying down for about 15 minutes can help prevent fainting, and injuries caused by a fall. Tell your doctor if you feel dizzy, or have vision changes or ringing in the ears.  · Some older children and adults get severe pain in the shoulder and have difficulty moving the arm where a shot was given. This happens very rarely.  · Any medication can cause a  severe allergic reaction. Such reactions from a vaccine are very rare, estimated at about 1 in a million doses, and would happen within a few minutes to a few hours after the vaccination.  As with any medicine, there is a very small chance of a vaccine causing a serious injury or death.  The safety of vaccines is always being monitored. For more information, visit: www.cdc.gov/vaccinesafety/  5. What if there is a serious reaction?  What should I look for?  Look for anything that concerns you, such as signs of a severe allergic reaction, very high fever, or unusual behavior.  Signs of a severe allergic reaction can include hives, swelling of the face and throat, difficulty breathing, a fast heartbeat, dizziness, and weakness--usually within a few minutes to a few hours after the vaccination.  What should I do?  · If you think it is a severe allergic reaction or other emergency that can’t wait, call 9-1-1 or get the person to the nearest hospital. Otherwise, call your doctor.  · Reactions should be reported to the Vaccine Adverse Event Reporting System (VAERS). Your doctor should file this report, or you can do it yourself through the VAERS web site at www.vaers.[facility].gov, or by calling 1-173.442.8798.  ? [employer] does not give medical advice.  6. The National Vaccine Injury Compensation Program  The National Vaccine Injury Compensation Program (VICP) is a federal program that was created to compensate people who may have been injured by certain vaccines.  Persons who believe they may have been injured by a vaccine can learn about the program and about filing a claim by calling 1-219.897.7583 or visiting the VICP website at www.hrsa.gov/vaccinecompensation. There is a time limit to file a claim for compensation.  7. How can I learn more?  · Ask your healthcare provider. He or she can give you the vaccine package insert or suggest other sources of information.  · Call your local or state health department.  · Contact the  Centers for Disease Control and Prevention (CDC):  ? Call 1-113.141.4100 (6-005-QYI-INFO) or  ? Visit CDC's website at www.cdc.gov/vaccines  Vaccine Information Statement, PCV13 Vaccine (11/05/2015)  This information is not intended to replace advice given to you by your health care provider. Make sure you discuss any questions you have with your health care provider.  Document Released: 10/15/2007 Document Revised: 09/07/2017 Document Reviewed: 09/07/2017  Elsevier Interactive Patient Education © 2017 [employer] Inc.

## 2019-02-19 NOTE — PROGRESS NOTES
Subjective   Chastity Berrios is a 63 y.o. female.     Chief Complaint   Patient presents with   • Dehydration     sick on cruise         Dehydration   The current episode started 1 to 4 weeks ago. The problem occurs rarely. The problem has been resolved. Associated symptoms include a change in bowel habit, headaches, nausea and vomiting. Pertinent negatives include no chills, coughing, fever, sore throat or urinary symptoms. She has tried drinking for the symptoms. The treatment provided significant relief.        The following portions of the patient's history were reviewed and updated as appropriate: allergies, current medications, past social history and problem list.    Outpatient Medications Marked as Taking for the 2/19/19 encounter (Office Visit) with Alvaro Small MD   Medication Sig Dispense Refill   • aspirin 81 MG EC tablet Take 1 tablet by mouth Daily. 60 tablet 1   • calcipotriene-betamethasone (TACLONEX) 0.005-0.064 % external suspension Apply  topically Daily.     • fluticasone (FLONASE) 50 MCG/ACT nasal spray 2 sprays into each nostril Daily As Needed.     • loratadine (CLARITIN) 10 MG tablet Take 10 mg by mouth Daily.     • metoprolol succinate XL (TOPROL XL) 25 MG 24 hr tablet Take 1 tablet by mouth Daily. 30 tablet 11   • Multiple Vitamins-Minerals (MULTIVITAMIN ADULTS PO) Take 1 tablet by mouth Daily.     • vitamin D (ERGOCALCIFEROL) 39880 units capsule capsule TAKE ONE CAPSULE BY MOUTH ONCE WEEKLY 12 capsule 1       Review of Systems   Constitutional: Negative for chills and fever.   HENT: Negative for sore throat.    Respiratory: Negative for cough.    Gastrointestinal: Positive for change in bowel habit, nausea and vomiting.   Neurological: Positive for headaches.       Objective   Vitals:    02/19/19 0828   BP: 146/72   Pulse: 89   Resp: 16   Temp: 97.6 °F (36.4 °C)   SpO2: 91%    There were no vitals filed for this visit. [unfilled]  Body mass index is 30.55 kg/m².      Physical Exam    Constitutional: She appears well-developed and well-nourished. No distress.   HENT:   Head: Normocephalic and atraumatic.   Neck: Carotid bruit is not present.   Cardiovascular: Regular rhythm and intact distal pulses.   Pulmonary/Chest: Effort normal and breath sounds normal. No respiratory distress. She has no wheezes. She has no rales.   Abdominal: Soft. Bowel sounds are normal. She exhibits no mass. There is no tenderness. There is no guarding.         Problem List Items Addressed This Visit     None      Visit Diagnoses     Gastroenteritis    -  Primary        Assessment/Plan   In for recheck of acute gastroenteritis.  12 days ago she became ill right at the end of a cruise.  Literally was 10 minutes from getting off a ship.  Developed nausea and vomiting and diarrhea.  Symptoms last about 9 hours.  They then cleared up.  They ran an EKG and some lab work including a CBC and CMP which were unremarkable other than elevated blood sugar.  She is back to normal now.  No further evaluation indicated.  Blood sugar is elevated at 177.  She will check a few fasting blood sugars at home and let me know if they are running over 120.  She had a trivial left carotid stenosis around the time of her surgery on ultrasound.  Will begin on Lipitor 20 mg daily.  She will get Prevnar today.  Pneumovax in 1 year.           .bmifollowup  Dragon disclaimer:   Much of this encounter note is an electronic transcription/translation of spoken language to printed text. The electronic translation of spoken language may permit erroneous, or at times, nonsensical words or phrases to be inadvertently transcribed; Although I have reviewed the note for such errors, some may still exist.

## 2019-04-09 RX ORDER — METOPROLOL SUCCINATE 25 MG/1
TABLET, EXTENDED RELEASE ORAL
Qty: 30 TABLET | Refills: 10 | Status: SHIPPED | OUTPATIENT
Start: 2019-04-09 | End: 2020-03-07

## 2019-04-22 RX ORDER — ERGOCALCIFEROL 1.25 MG/1
CAPSULE ORAL
Qty: 12 CAPSULE | Refills: 0 | Status: SHIPPED | OUTPATIENT
Start: 2019-04-22 | End: 2019-07-08 | Stop reason: SDUPTHER

## 2019-04-29 ENCOUNTER — TELEPHONE (OUTPATIENT)
Dept: CARDIOLOGY | Facility: CLINIC | Age: 64
End: 2019-04-29

## 2019-04-30 RX ORDER — AMOXICILLIN 500 MG/1
CAPSULE ORAL
Qty: 4 CAPSULE | Refills: 0 | Status: SHIPPED | OUTPATIENT
Start: 2019-04-30 | End: 2021-01-16

## 2019-05-01 NOTE — TELEPHONE ENCOUNTER
Pt wants to know what all she needs to take these pre-meds for besides going to the dentist?  She also sees an eye doctor and a dermatologist.  Please advise.    Thanks,  Estephanie

## 2019-07-08 ENCOUNTER — TELEPHONE (OUTPATIENT)
Dept: CARDIOLOGY | Facility: CLINIC | Age: 64
End: 2019-07-08

## 2019-07-08 RX ORDER — ERGOCALCIFEROL 1.25 MG/1
CAPSULE ORAL
Qty: 12 CAPSULE | Refills: 0 | Status: SHIPPED | OUTPATIENT
Start: 2019-07-08 | End: 2019-10-07 | Stop reason: SDUPTHER

## 2019-07-08 NOTE — TELEPHONE ENCOUNTER
Not needed for finger. Would continue to clean with peroxide once daily and keep covered for next 48 hours then expose to air and clean daily. If site worsens- to follow up with PCP.      AL

## 2019-07-08 NOTE — TELEPHONE ENCOUNTER
Regarding: Non-Urgent Medical Question  Contact: [phone]  ----- Message from [employer], Generic sent at 7/4/2019  7:31 PM EDT -----    I cut my  right index finger pad with a kitchen knife .   Bled profusely  while I rinsed it with water and poured hydrogen peroxide over it.  Bleeding has stopped using a bandaid and applying pressure.   After  45 minutes I Changed the bandaid and the skin is laying flat and looks closed.      My question is should I take some kind of antibiotic against infection.  I know I take 4 amoxicillin tablets for simple dental procedures.    Chastity Berrios

## 2019-09-09 ENCOUNTER — CLINICAL SUPPORT (OUTPATIENT)
Dept: INTERNAL MEDICINE | Facility: CLINIC | Age: 64
End: 2019-09-09

## 2019-09-09 DIAGNOSIS — Z23 NEED FOR IMMUNIZATION AGAINST INFLUENZA: Primary | ICD-10-CM

## 2019-09-09 PROCEDURE — G0008 ADMIN INFLUENZA VIRUS VAC: HCPCS | Performed by: INTERNAL MEDICINE

## 2019-09-09 PROCEDURE — 90674 CCIIV4 VAC NO PRSV 0.5 ML IM: CPT | Performed by: INTERNAL MEDICINE

## 2019-09-09 NOTE — PATIENT INSTRUCTIONS
Influenza Virus Vaccine injection  What is this medicine?  INFLUENZA VIRUS VACCINE (in floo EN [facility] SEEN) helps to reduce the risk of getting influenza also known as the flu. The vaccine only helps protect you against some strains of the flu.  This medicine may be used for other purposes; ask your health care provider or pharmacist if you have questions.  COMMON BRAND NAME(S): Afluria, Agriflu, Alfuria, FLUAD, Fluarix, Fluarix Quadrivalent, Flublok, Flublok Quadrivalent, FLUCELVAX, Flulaval, Fluvirin, Fluzone, Fluzone High-Dose, Fluzone Intradermal  What should I tell my health care provider before I take this medicine?  They need to know if you have any of these conditions:  -bleeding disorder like hemophilia  -fever or infection  -Guillain-[place] syndrome or other neurological problems  -immune system problems  -infection with the human immunodeficiency virus (HIV) or AIDS  -low blood platelet counts  -multiple sclerosis  -an unusual or allergic reaction to influenza virus vaccine, latex, other medicines, foods, dyes, or preservatives. Different brands of vaccines contain different allergens. Some may contain latex or eggs. Talk to your doctor about your allergies to make sure that you get the right vaccine.  -pregnant or trying to get pregnant  -breast-feeding  How should I use this medicine?  This vaccine is for injection into a muscle or under the skin. It is given by a health care professional.  A copy of Vaccine Information Statements will be given before each vaccination. Read this sheet carefully each time. The sheet may change frequently.  Talk to your healthcare provider to see which vaccines are right for you. Some vaccines should not be used in all age groups.  Overdosage: If you think you have taken too much of this medicine contact a poison control center or emergency room at once.  NOTE: This medicine is only for you. Do not share this medicine with others.  What if I miss a dose?  This  does not apply.  What may interact with this medicine?  -chemotherapy or radiation therapy  -medicines that lower your immune system like etanercept, anakinra, infliximab, and adalimumab  -medicines that treat or prevent blood clots like warfarin  -phenytoin  -steroid medicines like prednisone or cortisone  -theophylline  -vaccines  This list may not describe all possible interactions. Give your health care provider a list of all the medicines, herbs, non-prescription drugs, or dietary supplements you use. Also tell them if you smoke, drink alcohol, or use illegal drugs. Some items may interact with your medicine.  What should I watch for while using this medicine?  Report any side effects that do not go away within 3 days to your doctor or health care professional. Call your health care provider if any unusual symptoms occur within 6 weeks of receiving this vaccine.  You may still catch the flu, but the illness is not usually as bad. You cannot get the flu from the vaccine. The vaccine will not protect against colds or other illnesses that may cause fever. The vaccine is needed every year.  What side effects may I notice from receiving this medicine?  Side effects that you should report to your doctor or health care professional as soon as possible:  -allergic reactions like skin rash, itching or hives, swelling of the face, lips, or tongue  Side effects that usually do not require medical attention (report to your doctor or health care professional if they continue or are bothersome):  -fever  -headache  -muscle aches and pains  -pain, tenderness, redness, or swelling at the injection site  -tiredness  This list may not describe all possible side effects. Call your doctor for medical advice about side effects. You may report side effects to FDA at 7-022-FDA-0385.  Where should I keep my medicine?  The vaccine will be given by a health care professional in a clinic, pharmacy, doctor's office, or other health care  setting. You will not be given vaccine doses to store at home.  NOTE: This sheet is a summary. It may not cover all possible information. If you have questions about this medicine, talk to your doctor, pharmacist, or health care provider.  © 2019 Elsevier/Gold Standard (2016-07-08 10:07:28)

## 2019-10-07 RX ORDER — ERGOCALCIFEROL 1.25 MG/1
CAPSULE ORAL
Qty: 12 CAPSULE | Refills: 0 | Status: SHIPPED | OUTPATIENT
Start: 2019-10-07 | End: 2019-12-23

## 2019-11-01 ENCOUNTER — TRANSCRIBE ORDERS (OUTPATIENT)
Dept: ADMINISTRATIVE | Facility: HOSPITAL | Age: 64
End: 2019-11-01

## 2019-11-01 DIAGNOSIS — Z12.39 SCREENING BREAST EXAMINATION: Primary | ICD-10-CM

## 2019-11-06 ENCOUNTER — HOSPITAL ENCOUNTER (OUTPATIENT)
Dept: CARDIOLOGY | Facility: HOSPITAL | Age: 64
Discharge: HOME | End: 2019-11-06
Attending: INTERNAL MEDICINE | Admitting: INTERNAL MEDICINE

## 2019-11-06 VITALS
BODY MASS INDEX: 30.49 KG/M2 | DIASTOLIC BLOOD PRESSURE: 82 MMHG | HEART RATE: 71 BPM | SYSTOLIC BLOOD PRESSURE: 158 MMHG | WEIGHT: 183 LBS | HEIGHT: 65 IN

## 2019-11-06 DIAGNOSIS — T82.857A STENOSIS OF PROSTHETIC AORTIC VALVE: ICD-10-CM

## 2019-11-06 LAB
AORTIC ARCH: 2.3 CM
AORTIC DIMENSIONLESS INDEX: 0.5 (DI)
AORTIC ROOT ANNULUS: 2.2 CM
ASCENDING AORTA: 3.4 CM
BH CV ECHO AV AORTIC VALVE AT ACCEL TIME CALCULATED: NORMAL MSEC
BH CV ECHO MEAS - AI DEC SLOPE: 97.9 CM/SEC^2
BH CV ECHO MEAS - AI MAX PG: 43.4 MMHG
BH CV ECHO MEAS - AI MAX VEL: 329.6 CM/SEC
BH CV ECHO MEAS - AI P1/2T: 985.7 MSEC
BH CV ECHO MEAS - AO ACC TIME: 0.12 SEC
BH CV ECHO MEAS - AO MAX PG (FULL): 16.2 MMHG
BH CV ECHO MEAS - AO MAX PG: 20.4 MMHG
BH CV ECHO MEAS - AO MEAN PG (FULL): 9.1 MMHG
BH CV ECHO MEAS - AO MEAN PG: 11.7 MMHG
BH CV ECHO MEAS - AO V2 MAX: 225.7 CM/SEC
BH CV ECHO MEAS - AO V2 MEAN: 162.2 CM/SEC
BH CV ECHO MEAS - AO V2 VTI: 52.2 CM
BH CV ECHO MEAS - ASC AORTA: 3.4 CM
BH CV ECHO MEAS - AT: 119 SEC
BH CV ECHO MEAS - AVA(I,A): 1.4 CM^2
BH CV ECHO MEAS - AVA(I,D): 1.4 CM^2
BH CV ECHO MEAS - AVA(V,A): 1.3 CM^2
BH CV ECHO MEAS - AVA(V,D): 1.3 CM^2
BH CV ECHO MEAS - BSA(HAYCOCK): 2 M^2
BH CV ECHO MEAS - BSA: 1.9 M^2
BH CV ECHO MEAS - BZI_BMI: 30.5 KILOGRAMS/M^2
BH CV ECHO MEAS - BZI_METRIC_HEIGHT: 165.1 CM
BH CV ECHO MEAS - BZI_METRIC_WEIGHT: 83 KG
BH CV ECHO MEAS - EDV(MOD-SP2): 104 ML
BH CV ECHO MEAS - EDV(MOD-SP4): 93 ML
BH CV ECHO MEAS - EDV(TEICH): 79.7 ML
BH CV ECHO MEAS - EF(CUBED): 81 %
BH CV ECHO MEAS - EF(MOD-BP): 63 %
BH CV ECHO MEAS - EF(MOD-SP2): 62.5 %
BH CV ECHO MEAS - EF(MOD-SP4): 63.4 %
BH CV ECHO MEAS - EF(TEICH): 73.9 %
BH CV ECHO MEAS - ESV(MOD-SP2): 39 ML
BH CV ECHO MEAS - ESV(MOD-SP4): 34 ML
BH CV ECHO MEAS - ESV(TEICH): 20.8 ML
BH CV ECHO MEAS - FS: 42.5 %
BH CV ECHO MEAS - IVS/LVPW: 1.1
BH CV ECHO MEAS - IVSD: 1.3 CM
BH CV ECHO MEAS - LAT PEAK E' VEL: 11 CM/SEC
BH CV ECHO MEAS - LV DIASTOLIC VOL/BSA (35-75): 48.8 ML/M^2
BH CV ECHO MEAS - LV MASS(C)D: 186.7 GRAMS
BH CV ECHO MEAS - LV MASS(C)DI: 98 GRAMS/M^2
BH CV ECHO MEAS - LV MAX PG: 4.2 MMHG
BH CV ECHO MEAS - LV MEAN PG: 2.5 MMHG
BH CV ECHO MEAS - LV SYSTOLIC VOL/BSA (12-30): 17.8 ML/M^2
BH CV ECHO MEAS - LV V1 MAX: 102.4 CM/SEC
BH CV ECHO MEAS - LV V1 MEAN: 74.8 CM/SEC
BH CV ECHO MEAS - LV V1 VTI: 24.3 CM
BH CV ECHO MEAS - LVIDD: 4.2 CM
BH CV ECHO MEAS - LVIDS: 2.4 CM
BH CV ECHO MEAS - LVLD AP2: 8 CM
BH CV ECHO MEAS - LVLD AP4: 7.9 CM
BH CV ECHO MEAS - LVLS AP2: 7.1 CM
BH CV ECHO MEAS - LVLS AP4: 6.9 CM
BH CV ECHO MEAS - LVOT AREA (M): 2.8 CM^2
BH CV ECHO MEAS - LVOT AREA: 2.9 CM^2
BH CV ECHO MEAS - LVOT DIAM: 1.9 CM
BH CV ECHO MEAS - LVPWD: 1.2 CM
BH CV ECHO MEAS - MED PEAK E' VEL: 7 CM/SEC
BH CV ECHO MEAS - MR MAX PG: 112.9 MMHG
BH CV ECHO MEAS - MR MAX VEL: 531.3 CM/SEC
BH CV ECHO MEAS - MV A DUR: 0.14 SEC
BH CV ECHO MEAS - MV A MAX VEL: 74.1 CM/SEC
BH CV ECHO MEAS - MV DEC SLOPE: 511.3 CM/SEC^2
BH CV ECHO MEAS - MV DEC TIME: 0.17 SEC
BH CV ECHO MEAS - MV E MAX VEL: 89.1 CM/SEC
BH CV ECHO MEAS - MV E/A: 1.2
BH CV ECHO MEAS - MV MAX PG: 3.6 MMHG
BH CV ECHO MEAS - MV MEAN PG: 1.6 MMHG
BH CV ECHO MEAS - MV P1/2T MAX VEL: 91.7 CM/SEC
BH CV ECHO MEAS - MV P1/2T: 52.5 MSEC
BH CV ECHO MEAS - MV V2 MAX: 95.4 CM/SEC
BH CV ECHO MEAS - MV V2 MEAN: 56.1 CM/SEC
BH CV ECHO MEAS - MV V2 VTI: 28.8 CM
BH CV ECHO MEAS - MVA P1/2T LCG: 2.4 CM^2
BH CV ECHO MEAS - MVA(P1/2T): 4.2 CM^2
BH CV ECHO MEAS - MVA(VTI): 2.5 CM^2
BH CV ECHO MEAS - PA ACC TIME: 0.13 SEC
BH CV ECHO MEAS - PA MAX PG (FULL): 1.6 MMHG
BH CV ECHO MEAS - PA MAX PG: 3 MMHG
BH CV ECHO MEAS - PA PR(ACCEL): 18.4 MMHG
BH CV ECHO MEAS - PA V2 MAX: 86.1 CM/SEC
BH CV ECHO MEAS - PULM A REVS DUR: 0.1 SEC
BH CV ECHO MEAS - PULM A REVS VEL: 29.1 CM/SEC
BH CV ECHO MEAS - PULM DIAS VEL: 54.8 CM/SEC
BH CV ECHO MEAS - PULM S/D: 1.2
BH CV ECHO MEAS - PULM SYS VEL: 64.3 CM/SEC
BH CV ECHO MEAS - PVA(V,A): 1.3 CM^2
BH CV ECHO MEAS - PVA(V,D): 1.3 CM^2
BH CV ECHO MEAS - QP/QS: 0.31
BH CV ECHO MEAS - RV MAX PG: 1.4 MMHG
BH CV ECHO MEAS - RV MEAN PG: 0.78 MMHG
BH CV ECHO MEAS - RV V1 MAX: 58.7 CM/SEC
BH CV ECHO MEAS - RV V1 MEAN: 41.2 CM/SEC
BH CV ECHO MEAS - RV V1 VTI: 12.1 CM
BH CV ECHO MEAS - RVOT AREA: 1.8 CM^2
BH CV ECHO MEAS - RVOT DIAM: 1.5 CM
BH CV ECHO MEAS - SI(CUBED): 32.1 ML/M^2
BH CV ECHO MEAS - SI(LVOT): 37.4 ML/M^2
BH CV ECHO MEAS - SI(MOD-SP2): 34.1 ML/M^2
BH CV ECHO MEAS - SI(MOD-SP4): 31 ML/M^2
BH CV ECHO MEAS - SI(TEICH): 30.9 ML/M^2
BH CV ECHO MEAS - SUP REN AO DIAM: 2.2 CM
BH CV ECHO MEAS - SV(CUBED): 61.1 ML
BH CV ECHO MEAS - SV(LVOT): 71.3 ML
BH CV ECHO MEAS - SV(MOD-SP2): 65 ML
BH CV ECHO MEAS - SV(MOD-SP4): 59 ML
BH CV ECHO MEAS - SV(RVOT): 22.1 ML
BH CV ECHO MEAS - SV(TEICH): 58.9 ML
BH CV ECHO MEAS - TAPSE (>1.6): 1.9 CM2
BH CV ECHO MEASUREMENTS AVERAGE E/E' RATIO: 9.9
BH CV XLRA - RV BASE: 2.1 CM
BH CV XLRA - RV LENGTH: 5.2 CM
BH CV XLRA - RV MID: 1.6 CM
BH CV XLRA - TDI S': 11 CM/SEC
LEFT ATRIUM VOLUME INDEX: 26 ML/M2
SINUS: 3.1 CM
STJ: 2.9 CM

## 2019-11-06 PROCEDURE — 93306 TTE W/DOPPLER COMPLETE: CPT | Performed by: INTERNAL MEDICINE

## 2019-11-06 PROCEDURE — 93306 TTE W/DOPPLER COMPLETE: CPT

## 2019-11-06 PROCEDURE — 0399T ADULT TRANSTHORACIC ECHO COMPLETE W/ CONT IF NECESSARY PER PROTOCOL: CPT | Performed by: INTERNAL MEDICINE

## 2019-11-06 PROCEDURE — 0399T HC MYOCARDL STRAIN IMAG QUAN ASSMT PER SESS: CPT

## 2019-11-11 ENCOUNTER — OFFICE VISIT (OUTPATIENT)
Dept: INTERNAL MEDICINE | Facility: CLINIC | Age: 64
End: 2019-11-11

## 2019-11-11 VITALS
OXYGEN SATURATION: 97 % | HEART RATE: 80 BPM | HEIGHT: 65 IN | BODY MASS INDEX: 31.16 KG/M2 | SYSTOLIC BLOOD PRESSURE: 132 MMHG | WEIGHT: 187 LBS | DIASTOLIC BLOOD PRESSURE: 68 MMHG | TEMPERATURE: 98.1 F | RESPIRATION RATE: 16 BRPM

## 2019-11-11 DIAGNOSIS — M88.9 PAGET'S BONE DISEASE: ICD-10-CM

## 2019-11-11 DIAGNOSIS — R73.02 IGT (IMPAIRED GLUCOSE TOLERANCE): ICD-10-CM

## 2019-11-11 DIAGNOSIS — Z79.899 MEDICATION MANAGEMENT: ICD-10-CM

## 2019-11-11 DIAGNOSIS — I47.1 PAT (PAROXYSMAL ATRIAL TACHYCARDIA) (HCC): Primary | ICD-10-CM

## 2019-11-11 DIAGNOSIS — Z95.2 S/P AVR (AORTIC VALVE REPLACEMENT): ICD-10-CM

## 2019-11-11 PROBLEM — I35.0 AORTIC VALVE STENOSIS: Status: RESOLVED | Noted: 2018-02-20 | Resolved: 2019-11-11

## 2019-11-11 PROCEDURE — G0438 PPPS, INITIAL VISIT: HCPCS | Performed by: INTERNAL MEDICINE

## 2019-11-11 PROCEDURE — 99213 OFFICE O/P EST LOW 20 MIN: CPT | Performed by: INTERNAL MEDICINE

## 2019-11-11 NOTE — PROGRESS NOTES
The ABCs of the Annual Wellness Visit  Initial Medicare Wellness Visit    No chief complaint on file.      Subjective   History of Present Illness:  Chastity Berrios is a 63 y.o. female who presents for an Initial Medicare Wellness Visit.    HEALTH RISK ASSESSMENT    Recent Hospitalizations:  No hospitalization(s) within the last year.    Current Medical Providers:  Patient Care Team:  Alvaro Small MD as PCP - General (Internal Medicine)  Alvaro Small MD as PCP - Internal Medicine  Alvaro Small MD as PCP - Claims Attributed  Jose Bonner MD as Consulting Physician (Cardiology)  Fabián Morales MD as Consulting Physician (Cardiology)    Smoking Status:  Social History     Tobacco Use   Smoking Status Never Smoker   Smokeless Tobacco Never Used       Alcohol Consumption:  Social History     Substance and Sexual Activity   Alcohol Use Yes   • Alcohol/week: 0.6 oz   • Types: 1 Glasses of wine per week   • Frequency: 2-4 times a month   • Drinks per session: 1 or 2   • Binge frequency: Never    Comment: social / caffeine use       Depression Screen:   PHQ-2/PHQ-9 Depression Screening 11/11/2019   Little interest or pleasure in doing things 0   Feeling down, depressed, or hopeless 0   Trouble falling or staying asleep, or sleeping too much 1   Feeling tired or having little energy 1   Poor appetite or overeating 0   Feeling bad about yourself - or that you are a failure or have let yourself or your family down 0   Trouble concentrating on things, such as reading the newspaper or watching television 0   Moving or speaking so slowly that other people could have noticed. Or the opposite - being so fidgety or restless that you have been moving around a lot more than usual 0   Thoughts that you would be better off dead, or of hurting yourself in some way 0   Total Score 2   If you checked off any problems, how difficult have these problems made it for you to do your work, take care of things at home, or get  along with other people? Not difficult at all       Fall Risk Screen:  GORDON Fall Risk Assessment was completed, and patient is at LOW risk for falls.Assessment completed on:11/11/2019    Health Habits and Functional and Cognitive Screening:  Functional & Cognitive Status 11/11/2019   Do you have difficulty preparing food and eating? No   Do you have difficulty bathing yourself, getting dressed or grooming yourself? No   Do you have difficulty using the toilet? No   Do you have difficulty moving around from place to place? No   Do you have trouble with steps or getting out of a bed or a chair? No   Current Diet Unhealthy Diet   Dental Exam Up to date   Eye Exam Up to date   Exercise (times per week) 3 times per week   Current Exercise Activities Include Cardiovasular Workout on Exercise Equipment   Do you need help using the phone?  No   Are you deaf or do you have serious difficulty hearing?  No   Do you need help with transportation? No   Do you need help shopping? No   Do you need help preparing meals?  No   Do you need help with housework?  No   Do you need help with laundry? No   Do you need help taking your medications? No   Do you need help managing money? No   Do you ever drive or ride in a car without wearing a seat belt? No   Have you felt unusual stress, anger or loneliness in the last month? No   Who do you live with? Spouse   If you need help, do you have trouble finding someone available to you? No   Have you been bothered in the last four weeks by sexual problems? No   Do you have difficulty concentrating, remembering or making decisions? No         Does the patient have evidence of cognitive impairment? No    Asprin use counseling:Does not need ASA (and currently is not on it)    Age-appropriate Screening Schedule:  Refer to the list below for future screening recommendations based on patient's age, sex and/or medical conditions. Orders for these recommended tests are listed in the plan section. The  patient has been provided with a written plan.    Health Maintenance   Topic Date Due   • ZOSTER VACCINE (1 of 2) 11/22/2003   • COLONOSCOPY  04/01/2019   • LIPID PANEL  10/31/2019   • DXA SCAN  11/28/2019   • PNEUMOCOCCAL VACCINES (65+ LOW/MEDIUM RISK) (2 of 2 - PPSV23) 02/19/2020   • MAMMOGRAM  11/28/2020   • TDAP/TD VACCINES (2 - Td) 05/16/2021   • INFLUENZA VACCINE  Completed   • PAP SMEAR  Discontinued          The following portions of the patient's history were reviewed and updated as appropriate: allergies, current medications, past family history, past medical history, past social history, past surgical history and problem list.    Outpatient Medications Prior to Visit   Medication Sig Dispense Refill   • aspirin 81 MG EC tablet Take 1 tablet by mouth Daily. 60 tablet 1   • calcipotriene-betamethasone (TACLONEX) 0.005-0.064 % external suspension Apply  topically Daily.     • fluticasone (FLONASE) 50 MCG/ACT nasal spray 2 sprays into each nostril Daily As Needed.     • loratadine (CLARITIN) 10 MG tablet Take 10 mg by mouth Daily.     • metoprolol succinate XL (TOPROL-XL) 25 MG 24 hr tablet TAKE ONE TABLET BY MOUTH DAILY 30 tablet 10   • Multiple Vitamins-Minerals (MULTIVITAMIN ADULTS PO) Take 1 tablet by mouth Daily.     • vitamin D (ERGOCALCIFEROL) 64627 units capsule capsule TAKE ONE CAPSULE BY MOUTH ONCE WEEKLY 12 capsule 0   • amoxicillin (AMOXIL) 500 MG capsule Take all 4 pills 1 hour prior to dental procedure 4 capsule 0   • atorvastatin (LIPITOR) 20 MG tablet Take 1 tablet by mouth Daily. 90 tablet 3     Facility-Administered Medications Prior to Visit   Medication Dose Route Frequency Provider Last Rate Last Dose   • Chlorhexidine Gluconate Cloth 2 % pads 1 application  1 application Topical Q12H PRN Kaylah Prakash APRN           Patient Active Problem List   Diagnosis   • Psoriasis   • Paget's bone disease   • Perennial allergic rhinitis   • PAT (paroxysmal atrial tachycardia) (CMS/HCC)   •  "Migraine without aura and without status migrainosus, not intractable   • IGT (impaired glucose tolerance)   • Osteopenia   • Vitamin D deficiency   • Aortic valve stenosis   • Left carotid stenosis   • S/P AVR (aortic valve replacement)       Advanced Care Planning:  Patient does not have an advance directive - information provided to the patient today    Review of Systems    Compared to one year ago, the patient feels her physical health is the same.  Compared to one year ago, the patient feels her mental health is the same.    Reviewed chart for potential of high risk medication in the elderly: yes  Reviewed chart for potential of harmful drug interactions in the elderly:yes    Objective         Vitals:    11/11/19 1407   BP: 132/68   BP Location: Left arm   Patient Position: Sitting   Cuff Size: Adult   Pulse: 80   Resp: 16   Temp: 98.1 °F (36.7 °C)   TempSrc: Oral   SpO2: 97%   Weight: 84.8 kg (187 lb)   Height: 165.1 cm (65\")   PainSc: 0-No pain       Body mass index is 31.12 kg/m².  Discussed the patient's BMI with her. The BMI is above average; BMI management plan is completed.    Physical Exam          Assessment/Plan   Medicare Risks and Personalized Health Plan  CMS Preventative Services Quick Reference  Advance Directive Discussion  Obesity/Overweight     The above risks/problems have been discussed with the patient.  Pertinent information has been shared with the patient in the After Visit Summary.  Follow up plans and orders are seen below in the Assessment/Plan Section.    There are no diagnoses linked to this encounter.  Follow Up:  No Follow-up on file.     An After Visit Summary and PPPS were given to the patient.           "

## 2019-11-12 ENCOUNTER — OFFICE VISIT (OUTPATIENT)
Dept: CARDIOLOGY | Facility: CLINIC | Age: 64
End: 2019-11-12

## 2019-11-12 VITALS
HEART RATE: 83 BPM | SYSTOLIC BLOOD PRESSURE: 138 MMHG | BODY MASS INDEX: 30.42 KG/M2 | WEIGHT: 182.6 LBS | HEIGHT: 65 IN | OXYGEN SATURATION: 99 % | DIASTOLIC BLOOD PRESSURE: 70 MMHG

## 2019-11-12 DIAGNOSIS — I47.1 PAT (PAROXYSMAL ATRIAL TACHYCARDIA) (HCC): ICD-10-CM

## 2019-11-12 DIAGNOSIS — Z95.2 S/P AVR (AORTIC VALVE REPLACEMENT): Primary | ICD-10-CM

## 2019-11-12 LAB
ALBUMIN SERPL-MCNC: 4.4 G/DL (ref 3.5–5.2)
ALBUMIN/GLOB SERPL: 1.6 G/DL
ALP SERPL-CCNC: 97 U/L (ref 39–117)
ALT SERPL-CCNC: 30 U/L (ref 1–33)
AST SERPL-CCNC: 25 U/L (ref 1–32)
BASOPHILS # BLD AUTO: 0.06 10*3/MM3 (ref 0–0.2)
BASOPHILS NFR BLD AUTO: 0.9 % (ref 0–1.5)
BILIRUB SERPL-MCNC: 0.4 MG/DL (ref 0.2–1.2)
BUN SERPL-MCNC: 12 MG/DL (ref 8–23)
BUN/CREAT SERPL: 11.7 (ref 7–25)
CALCIUM SERPL-MCNC: 9.4 MG/DL (ref 8.6–10.5)
CHLORIDE SERPL-SCNC: 102 MMOL/L (ref 98–107)
CHOLEST SERPL-MCNC: 214 MG/DL (ref 0–200)
CO2 SERPL-SCNC: 28.3 MMOL/L (ref 22–29)
CREAT SERPL-MCNC: 1.03 MG/DL (ref 0.57–1)
EOSINOPHIL # BLD AUTO: 0.22 10*3/MM3 (ref 0–0.4)
EOSINOPHIL NFR BLD AUTO: 3.2 % (ref 0.3–6.2)
ERYTHROCYTE [DISTWIDTH] IN BLOOD BY AUTOMATED COUNT: 14 % (ref 12.3–15.4)
GLOBULIN SER CALC-MCNC: 2.8 GM/DL
GLUCOSE SERPL-MCNC: 94 MG/DL (ref 65–99)
HBA1C MFR BLD: 5.7 % (ref 4.8–5.6)
HCT VFR BLD AUTO: 39.6 % (ref 34–46.6)
HDLC SERPL-MCNC: 46 MG/DL (ref 40–60)
HGB BLD-MCNC: 13 G/DL (ref 12–15.9)
IMM GRANULOCYTES # BLD AUTO: 0.06 10*3/MM3 (ref 0–0.05)
IMM GRANULOCYTES NFR BLD AUTO: 0.9 % (ref 0–0.5)
LDLC SERPL CALC-MCNC: 133 MG/DL (ref 0–100)
LYMPHOCYTES # BLD AUTO: 2.55 10*3/MM3 (ref 0.7–3.1)
LYMPHOCYTES NFR BLD AUTO: 36.8 % (ref 19.6–45.3)
MCH RBC QN AUTO: 28.5 PG (ref 26.6–33)
MCHC RBC AUTO-ENTMCNC: 32.8 G/DL (ref 31.5–35.7)
MCV RBC AUTO: 86.8 FL (ref 79–97)
MONOCYTES # BLD AUTO: 0.55 10*3/MM3 (ref 0.1–0.9)
MONOCYTES NFR BLD AUTO: 7.9 % (ref 5–12)
NEUTROPHILS # BLD AUTO: 3.49 10*3/MM3 (ref 1.7–7)
NEUTROPHILS NFR BLD AUTO: 50.3 % (ref 42.7–76)
NRBC BLD AUTO-RTO: 0 /100 WBC (ref 0–0.2)
PLATELET # BLD AUTO: 209 10*3/MM3 (ref 140–450)
POTASSIUM SERPL-SCNC: 4.2 MMOL/L (ref 3.5–5.2)
PROT SERPL-MCNC: 7.2 G/DL (ref 6–8.5)
RBC # BLD AUTO: 4.56 10*6/MM3 (ref 3.77–5.28)
SODIUM SERPL-SCNC: 142 MMOL/L (ref 136–145)
TRIGL SERPL-MCNC: 176 MG/DL (ref 0–150)
VLDLC SERPL CALC-MCNC: 35.2 MG/DL
WBC # BLD AUTO: 6.93 10*3/MM3 (ref 3.4–10.8)

## 2019-11-12 PROCEDURE — 99213 OFFICE O/P EST LOW 20 MIN: CPT | Performed by: INTERNAL MEDICINE

## 2019-11-18 NOTE — PROGRESS NOTES
Subjective:     Encounter Date:11/12/2019      Patient ID: Chastity Berrios is a 63 y.o. female.    Chief Complaint: Aortic valve replacement/history of SVT.  History of Present Illness    63-year-old female who presents today for evaluation.  From a cardiovascular standpoint she is doing well.  She denies chest pain shortness of breath palpitations lightheadedness swelling fatigue.  Patient says she does itch a lot though I do not know the etiology of that is the only issue she complained about.    Review of Systems   All other systems reviewed and are negative.      Procedures  Interpretation Summary     · Left ventricular wall thickness is consistent with mild concentric hypertrophy.  · Calculated EF = 63%.  · Left ventricular systolic function is normal.  · Global Longitudinal LV strain = -23%.  · Mild aortic valve regurgitation is present.  · There is a 23 mm porcine bioprosthetic valve present. The aortic valve peak and mean gradients are within defined limits. The aortic insufficiency does not appear to be perivalvular or at a coaptation of the leaflets.          Objective:     Physical Exam   Constitutional: She is oriented to person, place, and time. She appears well-developed.   HENT:   Head: Normocephalic.   Eyes: Conjunctivae are normal.   Neck: Normal range of motion.   Cardiovascular: Normal rate, regular rhythm and normal heart sounds.   Pulmonary/Chest: Breath sounds normal.   Abdominal: Soft. Bowel sounds are normal.   Musculoskeletal: Normal range of motion. She exhibits no edema.   Neurological: She is alert and oriented to person, place, and time.   Skin: Skin is warm and dry.   Psychiatric: She has a normal mood and affect. Her behavior is normal.   Vitals reviewed.      Lab Review:       Assessment:          Diagnosis Plan   1. S/P AVR (aortic valve replacement)     2. PAT (paroxysmal atrial tachycardia) (CMS/[facility])            Plan:         1.  Status post aortic valve replacement.  Echo noted  above clinically doing well no issues.  2.  SVT status post ablation no recurrence.  3.  Blood pressures acceptable  4.  Continue same follow-up in 1 to 2 years sooner if issues should arise

## 2019-12-02 ENCOUNTER — HOSPITAL ENCOUNTER (OUTPATIENT)
Dept: MAMMOGRAPHY | Facility: HOSPITAL | Age: 64
Discharge: HOME | End: 2019-12-02
Admitting: INTERNAL MEDICINE

## 2019-12-02 DIAGNOSIS — Z12.39 SCREENING BREAST EXAMINATION: ICD-10-CM

## 2019-12-02 PROCEDURE — 77067 SCR MAMMO BI INCL CAD: CPT

## 2019-12-23 RX ORDER — ERGOCALCIFEROL 1.25 MG/1
CAPSULE ORAL
Qty: 12 CAPSULE | Refills: 0 | Status: SHIPPED | OUTPATIENT
Start: 2019-12-23 | End: 2020-03-16 | Stop reason: SDUPTHER

## 2020-03-07 RX ORDER — METOPROLOL SUCCINATE 25 MG/1
TABLET, EXTENDED RELEASE ORAL
Qty: 30 TABLET | Refills: 9 | Status: SHIPPED | OUTPATIENT
Start: 2020-03-07 | End: 2020-11-17 | Stop reason: SDUPTHER

## 2020-03-16 RX ORDER — ERGOCALCIFEROL 1.25 MG/1
CAPSULE ORAL
Qty: 12 CAPSULE | Refills: 0 | Status: SHIPPED | OUTPATIENT
Start: 2020-03-16 | End: 2020-06-14 | Stop reason: SDUPTHER

## 2020-05-22 ENCOUNTER — TELEPHONE (OUTPATIENT)
Dept: INTERNAL MEDICINE | Facility: CLINIC | Age: 65
End: 2020-05-22

## 2020-05-26 ENCOUNTER — TELEMEDICINE (OUTPATIENT)
Dept: INTERNAL MEDICINE | Facility: CLINIC | Age: 65
End: 2020-05-26

## 2020-05-26 DIAGNOSIS — N89.8 VAGINAL ITCHING: Primary | ICD-10-CM

## 2020-05-26 PROCEDURE — 99213 OFFICE O/P EST LOW 20 MIN: CPT | Performed by: INTERNAL MEDICINE

## 2020-05-26 RX ORDER — NYSTATIN AND TRIAMCINOLONE ACETONIDE 100000; 1 [USP'U]/G; MG/G
OINTMENT TOPICAL 2 TIMES DAILY
Qty: 30 G | Refills: 2 | Status: SHIPPED | OUTPATIENT
Start: 2020-05-26 | End: 2020-11-13

## 2020-05-26 NOTE — PROGRESS NOTES
Subjective   Chastity Berrios is a 64 y.o. female.     Chief Complaint   Patient presents with   • Vaginal Itching         Complaints of vaginal itching for about 6 months.  On and off.  She is tried multiple home remedies.  They all worked temporarily.  He comes right back.  She has tried cranberry juice, baby powder, Vaseline, yogurt, wearing pads all of which helped temporarily.  Mainly in the labial areas.       The following portions of the patient's history were reviewed and updated as appropriate: allergies, current medications, past social history and problem list.    Outpatient Medications Marked as Taking for the 5/26/20 encounter (Telemedicine) with Alvaro Small MD   Medication Sig Dispense Refill   • amoxicillin (AMOXIL) 500 MG capsule Take all 4 pills 1 hour prior to dental procedure 4 capsule 0   • aspirin 81 MG EC tablet Take 1 tablet by mouth Daily. 60 tablet 1   • fluticasone (FLONASE) 50 MCG/ACT nasal spray 2 sprays into each nostril Daily As Needed.     • loratadine (CLARITIN) 10 MG tablet Take 10 mg by mouth Daily.     • metoprolol succinate XL (TOPROL-XL) 25 MG 24 hr tablet TAKE ONE TABLET BY MOUTH DAILY 30 tablet 9   • Multiple Vitamins-Minerals (MULTIVITAMIN ADULTS PO) Take 1 tablet by mouth Daily.     • vitamin D (ERGOCALCIFEROL) 1.25 MG (99941 UT) capsule capsule TAKE OJNE CAPSULE BY MOUTH ONCE WEEKLY 12 capsule 0       Review of Systems   Constitutional: Negative for chills and fever.   Skin: Negative for rash.       Objective   There were no vitals filed for this visit.   Wt Readings from Last 3 Encounters:   11/12/19 82.8 kg (182 lb 9.6 oz)   11/11/19 84.8 kg (187 lb)   11/06/19 83 kg (183 lb)    There is no height or weight on file to calculate BMI.      Physical Exam   Constitutional: She appears well-developed and well-nourished.   Psychiatric: She has a normal mood and affect. Her behavior is normal. Judgment and thought content normal.         Problem List Items Addressed This  Visit     None      Visit Diagnoses     Vaginal itching    -  Primary    Relevant Medications    nystatin-triamcinolone (MYCOLOG) 364500-8.1 UNIT/GM-% ointment        Assessment/Plan   Video visit today with the COVID pandemic.  She has had some vulvar itching over the past 6 months or so.  She is tried all sorts of home remedies with only temporary success.  She does have a history of psoriasis.  She is mainly interested in vaginal psoriasis as a cause.  That is hard to say.  Could also be vaginal dryness related to lack of estrogen.  So-called atrophic vaginitis.  May be local yeast infection.  Will try on a round of Mycolog cream and see if that helps.  May need a topical estrogen or a Derm evaluation to look for vaginal psoriasis.  Spent 15 minutes with patient on the visit today.             Dragon disclaimer:   Much of this encounter note is an electronic transcription/translation of spoken language to printed text. The electronic translation of spoken language may permit erroneous, or at times, nonsensical words or phrases to be inadvertently transcribed; Although I have reviewed the note for such errors, some may still exist.

## 2020-06-15 RX ORDER — ERGOCALCIFEROL 1.25 MG/1
50000 CAPSULE ORAL
Qty: 12 CAPSULE | Refills: 3 | Status: SHIPPED | OUTPATIENT
Start: 2020-06-15 | End: 2021-05-10 | Stop reason: SDUPTHER

## 2020-10-23 ENCOUNTER — OFFICE VISIT (OUTPATIENT)
Dept: INTERNAL MEDICINE | Facility: CLINIC | Age: 65
End: 2020-10-23

## 2020-10-23 DIAGNOSIS — Z23 NEED FOR INFLUENZA VACCINATION: Primary | ICD-10-CM

## 2020-10-23 PROCEDURE — G0008 ADMIN INFLUENZA VIRUS VAC: HCPCS | Performed by: INTERNAL MEDICINE

## 2020-10-23 PROCEDURE — 90694 VACC AIIV4 NO PRSRV 0.5ML IM: CPT | Performed by: INTERNAL MEDICINE

## 2020-10-23 NOTE — PATIENT INSTRUCTIONS
Influenza (Flu) Vaccine (Inactivated or Recombinant): What You Need to Know  1. Why get vaccinated?  Influenza vaccine can prevent influenza (flu).  Flu is a contagious disease that spreads around the [place] every year, usually between October and May. Anyone can get the flu, but it is more dangerous for some people. Infants and young children, people 65 years of age and older, pregnant women, and people with certain health conditions or a weakened immune system are at greatest risk of flu complications.  Pneumonia, bronchitis, sinus infections and ear infections are examples of flu-related complications. If you have a medical condition, such as heart disease, cancer or diabetes, flu can make it worse.  Flu can cause fever and chills, sore throat, muscle aches, fatigue, cough, headache, and runny or stuffy nose. Some people may have vomiting and diarrhea, though this is more common in children than adults.  Each year thousands of people in the [place] die from flu, and many more are hospitalized. Flu vaccine prevents millions of illnesses and flu-related visits to the doctor each year.  2. Influenza vaccine  CDC recommends everyone 6 months of age and older get vaccinated every flu season. Children 6 months through 8 years of age may need 2 doses during a single flu season. Everyone else needs only 1 dose each flu season.  It takes about 2 weeks for protection to develop after vaccination.  There are many flu viruses, and they are always changing. Each year a new flu vaccine is made to protect against three or four viruses that are likely to cause disease in the upcoming flu season. Even when the vaccine doesn't exactly match these viruses, it may still provide some protection.  Influenza vaccine does not cause flu.  Influenza vaccine may be given at the same time as other vaccines.  3. Talk with your health care provider  Tell your vaccine provider if the person getting the vaccine:  · Has had an  allergic reaction after a previous dose of influenza vaccine, or has any severe, life-threatening allergies.  · Has ever had Guillain-Barré Syndrome (also called GBS).  In some cases, your health care provider may decide to postpone influenza vaccination to a future visit.  People with minor illnesses, such as a cold, may be vaccinated. People who are moderately or severely ill should usually wait until they recover before getting influenza vaccine.  Your health care provider can give you more information.  4. Risks of a vaccine reaction  · Soreness, redness, and swelling where shot is given, fever, muscle aches, and headache can happen after influenza vaccine.  · There may be a very small increased risk of Guillain-Barré Syndrome (GBS) after inactivated influenza vaccine (the flu shot).  Young children who get the flu shot along with pneumococcal vaccine (PCV13), and/or DTaP vaccine at the same time might be slightly more likely to have a seizure caused by fever. Tell your health care provider if a child who is getting flu vaccine has ever had a seizure.  People sometimes faint after medical procedures, including vaccination. Tell your provider if you feel dizzy or have vision changes or ringing in the ears.  As with any medicine, there is a very remote chance of a vaccine causing a severe allergic reaction, other serious injury, or death.  5. What if there is a serious problem?  An allergic reaction could occur after the vaccinated person leaves the clinic. If you see signs of a severe allergic reaction (hives, swelling of the face and throat, difficulty breathing, a fast heartbeat, dizziness, or weakness), call 9-1-1 and get the person to the nearest hospital.  For other signs that concern you, call your health care provider.  Adverse reactions should be reported to the Vaccine Adverse Event Reporting System (VAERS). Your health care provider will usually file this report, or you can do it yourself. Visit the  VAERS website at www.vaers.[facility].gov or call 1-580.877.2048.VAERS is only for reporting reactions, and VAERS staff do not give medical advice.  6. The National Vaccine Injury Compensation Program  The National Vaccine Injury Compensation Program (VICP) is a federal program that was created to compensate people who may have been injured by certain vaccines. Visit the VICP website at www.[facility].gov/vaccinecompensation or call 1-164.403.7689 to learn about the program and about filing a claim. There is a time limit to file a claim for compensation.  7. How can I learn more?  · Ask your healthcare provider.  · Call your local or state health department.  · Contact the Centers for Disease Control and Prevention (CDC):  ? Call 1-390.248.5879 (9-431-VRR-INFO) or  ? Visit CDC's www.cdc.gov/flu  Vaccine Information Statement (Interim) Inactivated Influenza Vaccine (8/15/2019)  This information is not intended to replace advice given to you by your health care provider. Make sure you discuss any questions you have with your health care provider.  Document Released: 10/12/2007 Document Revised: 04/07/2020 Document Reviewed: 08/19/2019  Elsevier Patient Education © 2020 [employer]

## 2020-11-02 ENCOUNTER — TRANSCRIBE ORDERS (OUTPATIENT)
Dept: ADMINISTRATIVE | Facility: HOSPITAL | Age: 65
End: 2020-11-02

## 2020-11-02 DIAGNOSIS — Z12.39 SCREENING BREAST EXAMINATION: Primary | ICD-10-CM

## 2020-11-13 ENCOUNTER — TELEPHONE (OUTPATIENT)
Dept: CARDIOLOGY | Facility: CLINIC | Age: 65
End: 2020-11-13

## 2020-11-13 ENCOUNTER — OFFICE VISIT (OUTPATIENT)
Dept: INTERNAL MEDICINE | Facility: CLINIC | Age: 65
End: 2020-11-13

## 2020-11-13 VITALS
TEMPERATURE: 96.8 F | SYSTOLIC BLOOD PRESSURE: 142 MMHG | HEART RATE: 79 BPM | BODY MASS INDEX: 31.99 KG/M2 | WEIGHT: 192 LBS | HEIGHT: 65 IN | RESPIRATION RATE: 16 BRPM | OXYGEN SATURATION: 100 % | DIASTOLIC BLOOD PRESSURE: 60 MMHG

## 2020-11-13 DIAGNOSIS — M88.9 PAGET'S BONE DISEASE: ICD-10-CM

## 2020-11-13 DIAGNOSIS — R73.02 IGT (IMPAIRED GLUCOSE TOLERANCE): Primary | ICD-10-CM

## 2020-11-13 DIAGNOSIS — R93.7 ABNORMAL X-RAY OF BONE: ICD-10-CM

## 2020-11-13 DIAGNOSIS — I47.1 PAT (PAROXYSMAL ATRIAL TACHYCARDIA) (HCC): ICD-10-CM

## 2020-11-13 DIAGNOSIS — Z95.2 S/P AVR (AORTIC VALVE REPLACEMENT): ICD-10-CM

## 2020-11-13 DIAGNOSIS — I65.22 LEFT CAROTID STENOSIS: ICD-10-CM

## 2020-11-13 DIAGNOSIS — Z95.2 S/P AVR (AORTIC VALVE REPLACEMENT): Primary | ICD-10-CM

## 2020-11-13 DIAGNOSIS — Z79.899 MEDICATION MANAGEMENT: ICD-10-CM

## 2020-11-13 PROCEDURE — G0439 PPPS, SUBSEQ VISIT: HCPCS | Performed by: INTERNAL MEDICINE

## 2020-11-13 PROCEDURE — 99214 OFFICE O/P EST MOD 30 MIN: CPT | Performed by: INTERNAL MEDICINE

## 2020-11-13 NOTE — PROGRESS NOTES
Subjective   Chastity Berrios is a 64 y.o. female.     Chief Complaint   Patient presents with   • Hyperlipidemia   • Hyperglycemia         In for recheck of aortic stenosis.  An aortic valve replacement 2/20/2018.  She is completely asymptomatic.  No angina.  No congestive heart failure.  No syncope or presyncope.  Got a porcine aVR.  No history of recent palpitations or PSVT.    Hyperglycemia  This is a chronic problem. The current episode started more than 1 year ago. The problem occurs constantly. The problem has been unchanged. Pertinent negatives include no abdominal pain, chest pain, chills, coughing, fatigue, fever, nausea or vomiting. Nothing aggravates the symptoms. The treatment provided no relief.        The following portions of the patient's history were reviewed and updated as appropriate: allergies, current medications, past social history and problem list.    Outpatient Medications Marked as Taking for the 11/13/20 encounter (Office Visit) with Alvaro Small MD   Medication Sig Dispense Refill   • aspirin 81 MG EC tablet Take 1 tablet by mouth Daily. 60 tablet 1   • fluticasone (FLONASE) 50 MCG/ACT nasal spray 2 sprays into each nostril Daily As Needed.     • loratadine (CLARITIN) 10 MG tablet Take 10 mg by mouth Daily.     • metoprolol succinate XL (TOPROL-XL) 25 MG 24 hr tablet TAKE ONE TABLET BY MOUTH DAILY 30 tablet 9   • Multiple Vitamins-Minerals (MULTIVITAMIN ADULTS PO) Take 1 tablet by mouth Daily.     • vitamin D (ERGOCALCIFEROL) 1.25 MG (30018 UT) capsule capsule Take 1 capsule by mouth Every 7 (Seven) Days. 12 capsule 3       Review of Systems   Constitutional: Negative for chills, fatigue and fever.   Respiratory: Negative for cough, shortness of breath and wheezing.    Cardiovascular: Negative for chest pain, palpitations and leg swelling.   Gastrointestinal: Negative for abdominal pain, constipation, diarrhea, nausea and vomiting.       Objective   Vitals:    11/13/20 1330   BP: 142/60    Pulse: 79   Resp: 16   Temp: 96.8 °F (36 °C)   SpO2: 100%          11/13/20  1330   Weight: 87.1 kg (192 lb)    [unfilled]  Body mass index is 31.95 kg/m².      Physical Exam   Constitutional: She appears well-developed. No distress.   HENT:   Head: Normocephalic and atraumatic.   Neck: Carotid bruit is not present. No thyromegaly present.   Cardiovascular: Normal rate and regular rhythm. Exam reveals no gallop.   Murmur ( ) heard.   Crescendo decrescendo systolic murmur is present with a grade of 2/6.  Short grade 2/6 FLOR at the aortic area, grade 2 diastolic blow aortic area   Pulmonary/Chest: Effort normal and breath sounds normal. No respiratory distress. She has no wheezes. She has no rales.   Abdominal: Soft. Normal appearance and bowel sounds are normal. She exhibits no mass. There is no abdominal tenderness. There is no guarding.   Neurological: She is alert.         Problems Addressed this Visit        Cardiovascular and Mediastinum    Left carotid stenosis    PAT (paroxysmal atrial tachycardia) (CMS/[facility])       Endocrine    IGT (impaired glucose tolerance) - Primary       Other    S/P AVR (aortic valve replacement)      Diagnoses       Codes Comments    IGT (impaired glucose tolerance)    -  Primary ICD-10-CM: R73.02  ICD-9-CM: 790.22     Left carotid stenosis     ICD-10-CM: I65.22  ICD-9-CM: 433.10     PAT (paroxysmal atrial tachycardia) (CMS/Former[facility])     ICD-10-CM: I47.1  ICD-9-CM: 427.0     S/P AVR (aortic valve replacement)     ICD-10-CM: Z95.2  ICD-9-CM: V43.3         Assessment/Plan   In for recheck of AVR and IGT.  History of PSVT.  History of Paget's disease.  Overall health is doing very well.  She's done great since he aVR.  No cardiac symptoms.  She got annual lab work today November 2020 with CBC, CMP, lipids, A1c.  Labs look great.  Lipids remain borderline.  She had actually no evidence of CAD.  No recent consideration to treating her lipids.  She got her annual wellness visit today as well.   She has a mammogram upcoming.  Due for Pneumovax after 11/23/2020.  Due for repeat DEXA scan.  We'll follow-up one year.  She is 2 hours postprandial today.  She needs to improve her exercise.    PPE today includes face mask and eye shield.         Dragon disclaimer:   Much of this encounter note is an electronic transcription/translation of spoken language to printed text. The electronic translation of spoken language may permit erroneous, or at times, nonsensical words or phrases to be inadvertently transcribed; Although I have reviewed the note for such errors, some may still exist.

## 2020-11-13 NOTE — PATIENT INSTRUCTIONS
"BMI for Adults  What is BMI?  Body mass index (BMI) is a number that is calculated from a person's weight and height. BMI can help estimate how much of a person's weight is composed of fat. BMI does not measure body fat directly. Rather, it is an alternative to procedures that directly measure body fat, which can be difficult and expensive.  BMI can help identify people who may be at higher risk for certain medical problems.  What are BMI measurements used for?  BMI is used as a screening tool to identify possible weight problems. It helps determine whether a person is obese, overweight, a healthy weight, or underweight.  BMI is useful for:  · Identifying a weight problem that may be related to a medical condition or may increase the risk for medical problems.  · Promoting changes, such as changes in diet and exercise, to help reach a healthy weight. BMI screening can be repeated to see if these changes are working.  How is BMI calculated?  BMI involves measuring your weight in relation to your height. Both height and weight are measured, and the BMI is calculated from those numbers. This can be done either in English ([place] or metric measurements. Note that charts and online BMI calculators are available to help you find your BMI quickly and easily without having to do these calculations yourself.  To calculate your BMI in English ([place] measurements:    1. Measure your weight in pounds (lb).  2. Multiply the number of pounds by 703.  ? For example, for a person who weighs 180 lb, multiply that number by 703, which equals 126,540.  3. Measure your height in inches. Then multiply that number by itself to get a measurement called \"inches squared.\"  ? For example, for a person who is 70 inches tall, the \"inches squared\" measurement is 70 inches x 70 inches, which equals 4,900 inches squared.  4. Divide the total from step 2 (number of lb x 703) by the total from step 3 (inches squared): 126,540 ÷ 4,900 = 25.8. This is " "your BMI.  To calculate your BMI in metric measurements:  1. Measure your weight in kilograms (kg).  2. Measure your height in meters (m). Then multiply that number by itself to get a measurement called \"meters squared.\"  ? For example, for a person who is 1.75 m tall, the \"meters squared\" measurement is 1.75 m x 1.75 m, which is equal to 3.1 meters squared.  3. Divide the number of kilograms (your weight) by the meters squared number. In this example: 70 ÷ 3.1 = 22.6. This is your BMI.  What do the results mean?  BMI charts are used to identify whether you are underweight, normal weight, overweight, or obese. The following guidelines will be used:  · Underweight: BMI less than 18.5.  · Normal weight: BMI between 18.5 and 24.9.  · Overweight: BMI between 25 and 29.9.  · Obese: BMI of 30 or above.  Keep these notes in mind:  · Weight includes both fat and muscle, so someone with a muscular build, such as an athlete, may have a BMI that is higher than 24.9. In cases like these, BMI is not an accurate measure of body fat.  · To determine if excess body fat is the cause of a BMI of 25 or higher, further assessments may need to be done by a health care provider.  · BMI is usually interpreted in the same way for men and women.  Where to find more information  For more information about BMI, including tools to quickly calculate your BMI, go to these websites:  · Centers for Disease Control and Prevention: www.cdc.gov  · American Heart Association: www.heart.org  · National Heart, Lung, and Blood [place]: www.nhlbi.nih.gov  Summary  · Body mass index (BMI) is a number that is calculated from a person's weight and height.  · BMI may help estimate how much of a person's weight is composed of fat. BMI can help identify those who may be at higher risk for certain medical problems.  · BMI can be measured using English measurements or metric measurements.  · BMI charts are used to identify whether you are underweight, normal " weight, overweight, or obese.  This information is not intended to replace advice given to you by your health care provider. Make sure you discuss any questions you have with your health care provider.  Document Released: 08/29/2005 Document Revised: 09/09/2020 Document Reviewed: 07/17/2020  Elsevier Patient Education © 2020 [employer]  Exercising to Stay Healthy  To become healthy and stay healthy, it is recommended that you do moderate-intensity and vigorous-intensity exercise. You can tell that you are exercising at a moderate intensity if your heart starts beating faster and you start breathing faster but can still hold a conversation. You can tell that you are exercising at a vigorous intensity if you are breathing much harder and faster and cannot hold a conversation while exercising.  Exercising regularly is important. It has many health benefits, such as:  · Improving overall fitness, flexibility, and endurance.  · Increasing bone density.  · Helping with weight control.  · Decreasing body fat.  · Increasing muscle strength.  · Reducing stress and tension.  · Improving overall health.  How often should I exercise?  Choose an activity that you enjoy, and set realistic goals. Your health care provider can help you make an activity plan that works for you.  Exercise regularly as told by your health care provider. This may include:  · Doing strength training two times a week, such as:  ? Lifting weights.  ? Using resistance bands.  ? Push-ups.  ? Sit-ups.  ? Yoga.  · Doing a certain intensity of exercise for a given amount of time. Choose from these options:  ? A total of 150 minutes of moderate-intensity exercise every week.  ? A total of 75 minutes of vigorous-intensity exercise every week.  ? A mix of moderate-intensity and vigorous-intensity exercise every week.  Children, pregnant women, people who have not exercised regularly, people who are overweight, and older adults may need to talk with a health  care provider about what activities are safe to do. If you have a medical condition, be sure to talk with your health care provider before you start a new exercise program.  What are some exercise ideas?  Moderate-intensity exercise ideas include:  · Walking 1 mile (1.6 km) in about 15 minutes.  · Biking.  · Hiking.  · Golfing.  · Dancing.  · Water aerobics.  Vigorous-intensity exercise ideas include:  · Walking 4.5 miles (7.2 km) or more in about 1 hour.  · Jogging or running 5 miles (8 km) in about 1 hour.  · Biking 10 miles (16.1 km) or more in about 1 hour.  · Lap swimming.  · Roller-skating or in-line skating.  · Cross-country skiing.  · Vigorous competitive sports, such as football, basketball, and soccer.  · Jumping rope.  · Aerobic dancing.  What are some everyday activities that can help me to get exercise?  · Yard work, such as:  ? Pushing a .  ? Raking and bagging leaves.  · Washing your car.  · Pushing a stroller.  · Shoveling snow.  · Gardening.  · Washing windows or floors.  How can I be more active in my day-to-day activities?  · Use stairs instead of an elevator.  · Take a walk during your lunch break.  · If you drive, park your car farther away from your work or school.  · If you take public transportation, get off one stop early and walk the rest of the way.  · Stand up or walk around during all of your indoor phone calls.  · Get up, stretch, and walk around every 30 minutes throughout the day.  · Enjoy exercise with a friend. Support to continue exercising will help you keep a regular routine of activity.  What guidelines can I follow while exercising?  · Before you start a new exercise program, talk with your health care provider.  · Do not exercise so much that you hurt yourself, feel dizzy, or get very short of breath.  · Wear comfortable clothes and wear shoes with good support.  · Drink plenty of water while you exercise to prevent dehydration or heat stroke.  · Work out until your  breathing and your heartbeat get faster.  Where to find more information  · [place] Department of Health and Human Services: www.hhs.gov  · Centers for Disease Control and Prevention (CDC): www.cdc.gov  Summary  · Exercising regularly is important. It will improve your overall fitness, flexibility, and endurance.  · Regular exercise also will improve your overall health. It can help you control your weight, reduce stress, and improve your bone density.  · Do not exercise so much that you hurt yourself, feel dizzy, or get very short of breath.  · Before you start a new exercise program, talk with your health care provider.  This information is not intended to replace advice given to you by your health care provider. Make sure you discuss any questions you have with your health care provider.  Document Released: 01/20/2012 Document Revised: 11/30/2018 Document Reviewed: 11/08/2018  [employer] Patient Education © 2020 [employer]  Advance Directive    Advance directives are legal documents that let you make choices ahead of time about your health care and medical treatment in case you become unable to communicate for yourself. Advance directives are a way for you to make known your wishes to family, friends, and health care providers. This can let others know about your end-of-life care if you become unable to communicate.  Discussing and writing advance directives should happen over time rather than all at once. Advance directives can be changed depending on your situation and what you want, even after you have signed the advance directives.  There are different types of advance directives, such as:  · Medical power of .  · Living will.  · Do not resuscitate (DNR) or do not attempt resuscitation (DNAR) order.  Health care proxy and medical power of   A health care proxy is also called a health care agent. This is a person who is appointed to make medical decisions for you in cases where you are unable to  make the decisions yourself. Generally, people choose someone they know well and trust to represent their preferences. Make sure to ask this person for an agreement to act as your proxy. A proxy may have to exercise judgment in the event of a medical decision for which your wishes are not known.  A medical power of  is a legal document that names your health care proxy. Depending on the laws in your state, after the document is written, it may also need to be:  · Signed.  · Notarized.  · Dated.  · Copied.  · Witnessed.  · Incorporated into your medical record.  You may also want to appoint someone to manage your money in a situation in which you are unable to do so. This is called a durable power of  for finances. It is a separate legal document from the durable power of  for health care. You may choose the same person or someone different from your health care proxy to act as your agent in money matters.  If you do not appoint a proxy, or if there is a concern that the proxy is not acting in your best interests, a court may appoint a guardian to act on your behalf.  Living will  A living will is a set of instructions that state your wishes about medical care when you cannot express them yourself. Health care providers should keep a copy of your living will in your medical record. You may want to give a copy to family members or friends. To alert caregivers in case of an emergency, you can place a card in your wallet to let them know that you have a living will and where they can find it. A living will is used if you become:  · Terminally ill.  · Disabled.  · Unable to communicate or make decisions.  Items to consider in your living will include:  · To use or not to use life-support equipment, such as dialysis machines and breathing machines (ventilators).  · A DNR or DNAR order. This tells health care providers not to use cardiopulmonary resuscitation (CPR) if breathing or heartbeat  stops.  · To use or not to use tube feeding.  · To be given or not to be given food and fluids.  · Comfort (palliative) care when the goal becomes comfort rather than a cure.  · Donation of organs and tissues.  A living will does not give instructions for distributing your money and property if you should pass away.  DNR or DNAR  A DNR or DNAR order is a request not to have CPR in the event that your heart stops beating or you stop breathing. If a DNR or DNAR order has not been made and shared, a health care provider will try to help any patient whose heart has stopped or who has stopped breathing. If you plan to have surgery, talk with your health care provider about how your DNR or DNAR order will be followed if problems occur.  What if I do not have an advance directive?  If you do not have an advance directive, some states assign family decision makers to act on your behalf based on how closely you are related to them. Each state has its own laws about advance directives. You may want to check with your health care provider, , or state representative about the laws in your state.  Summary  · Advance directives are the legal documents that allow you to make choices ahead of time about your health care and medical treatment in case you become unable to tell others about your care.  · The process of discussing and writing advance directives should happen over time. You can change the advance directives, even after you have signed them.  · Advance directives include DNR or DNAR orders, living ceballos, and designating an agent as your medical power of .  This information is not intended to replace advice given to you by your health care provider. Make sure you discuss any questions you have with your health care provider.  Document Released: 03/26/2009 Document Revised: 07/16/2020 Document Reviewed: 07/16/2020  Elsevier Patient Education © 2020 [employer].  Pneumococcal Polysaccharide Vaccine  (PPSV23): What You Need to Know  1. Why get vaccinated?  Pneumococcal polysaccharide vaccine (PPSV23) can prevent pneumococcal disease.  Pneumococcal disease refers to any illness caused by pneumococcal bacteria. These bacteria can cause many types of illnesses, including pneumonia, which is an infection of the lungs. Pneumococcal bacteria are one of the most common causes of pneumonia.  Besides pneumonia, pneumococcal bacteria can also cause:  · Ear infections  · Sinus infections  · Meningitis (infection of the tissue covering the brain and spinal cord)  · Bacteremia (bloodstream infection)  Anyone can get pneumococcal disease, but children under 2 years of age, people with certain medical conditions, adults 65 years or older, and cigarette smokers are at the highest risk.  Most pneumococcal infections are mild. However, some can result in long-term problems, such as brain damage or hearing loss. Meningitis, bacteremia, and pneumonia caused by pneumococcal disease can be fatal.  2. PPSV23  PPSV23 protects against 23 types of bacteria that cause pneumococcal disease.  PPSV23 is recommended for:  · All adults 65 years or older,  · Anyone 2 years or older with certain medical conditions that can lead to an increased risk for pneumococcal disease.  Most people need only one dose of PPSV23. A second dose of PPSV23, and another type of pneumococcal vaccine called PCV13, are recommended for certain high-risk groups. Your health care provider can give you more information.  People 65 years or older should get a dose of PPSV23 even if they have already gotten one or more doses of the vaccine before they turned 65.  3. Talk with your health care provider  Tell your vaccine provider if the person getting the vaccine:  · Has had an allergic reaction after a previous dose of PPSV23, or has any severe, life-threatening allergies.  In some cases, your health care provider may decide to postpone PPSV23 vaccination to a future  visit.  People with minor illnesses, such as a cold, may be vaccinated. People who are moderately or severely ill should usually wait until they recover before getting PPSV23.  Your health care provider can give you more information.  4. Risks of a vaccine reaction  · Redness or pain where the shot is given, feeling tired, fever, or muscle aches can happen after PPSV23.  People sometimes faint after medical procedures, including vaccination. Tell your provider if you feel dizzy or have vision changes or ringing in the ears.  As with any medicine, there is a very remote chance of a vaccine causing a severe allergic reaction, other serious injury, or death.  5. What if there is a serious problem?  An allergic reaction could occur after the vaccinated person leaves the clinic. If you see signs of a severe allergic reaction (hives, swelling of the face and throat, difficulty breathing, a fast heartbeat, dizziness, or weakness), call 9-1-1 and get the person to the nearest hospital.  For other signs that concern you, call your health care provider.  Adverse reactions should be reported to the Vaccine Adverse Event Reporting System (VAERS). Your health care provider will usually file this report, or you can do it yourself. Visit the VAERS website at www.vaers.hhs.gov or call 1-313.693.3461. VAERS is only for reporting reactions, and VAERS staff do not give medical advice.  6. How can I learn more?  · Ask your health care provider.  · Call your local or state health department.  · Contact the Centers for Disease Control and Prevention (CDC):  ? Call 1-989.585.4906 (2-756-RIB-INFO) or  ? Visit CDC's website at www.cdc.gov/vaccines  CDC Vaccine Information Statement PPSV23 Vaccine (10/30/2019)  This information is not intended to replace advice given to you by your health care provider. Make sure you discuss any questions you have with your health care provider.  Document Released: 10/15/2007 Document Revised: 04/07/2020  Document Reviewed: 07/30/2019  Elsevier Patient Education © 2020 [employer]

## 2020-11-13 NOTE — PROGRESS NOTES
The ABCs of the Annual Wellness Visit  Subsequent Medicare Wellness Visit    No chief complaint on file.      Subjective   History of Present Illness:  Chastity Berrios is a 64 y.o. female who presents for a Subsequent Medicare Wellness Visit.    HEALTH RISK ASSESSMENT    Recent Hospitalizations:  No hospitalization(s) within the last year.    Current Medical Providers:  Patient Care Team:  Alvaro Small MD as PCP - General (Internal Medicine)  Alvaro Small MD as PCP - Internal Medicine  Jsoe Bonner MD as Consulting Physician (Cardiology)  Fabián Morales MD as Consulting Physician (Cardiology)    Smoking Status:  Social History     Tobacco Use   Smoking Status Never Smoker   Smokeless Tobacco Never Used       Alcohol Consumption:  Social History     Substance and Sexual Activity   Alcohol Use Yes   • Alcohol/week: 1.0 standard drinks   • Types: 1 Glasses of wine per week   • Frequency: 2-4 times a month   • Drinks per session: 1 or 2   • Binge frequency: Never    Comment: social / caffeine use       Depression Screen:   PHQ-2/PHQ-9 Depression Screening 11/13/2020   Little interest or pleasure in doing things 0   Feeling down, depressed, or hopeless 0   Trouble falling or staying asleep, or sleeping too much 1   Feeling tired or having little energy 1   Poor appetite or overeating 1   Feeling bad about yourself - or that you are a failure or have let yourself or your family down 0   Trouble concentrating on things, such as reading the newspaper or watching television 0   Moving or speaking so slowly that other people could have noticed. Or the opposite - being so fidgety or restless that you have been moving around a lot more than usual 0   Thoughts that you would be better off dead, or of hurting yourself in some way 0   Total Score 3   If you checked off any problems, how difficult have these problems made it for you to do your work, take care of things at home, or get along with other people? Not  difficult at all       Fall Risk Screen:  GORDON Fall Risk Assessment was completed, and patient is at LOW risk for falls.Assessment completed on:11/13/2020    Health Habits and Functional and Cognitive Screening:  Functional & Cognitive Status 11/13/2020   Do you have difficulty preparing food and eating? No   Do you have difficulty bathing yourself, getting dressed or grooming yourself? No   Do you have difficulty using the toilet? No   Do you have difficulty moving around from place to place? No   Do you have trouble with steps or getting out of a bed or a chair? No   Current Diet Well Balanced Diet   Dental Exam Not up to date   Eye Exam Up to date   Exercise (times per week) 0 times per week   Current Exercises Include No Regular Exercise;Walking   Current Exercise Activities Include -   Do you need help using the phone?  No   Are you deaf or do you have serious difficulty hearing?  No   Do you need help with transportation? No   Do you need help shopping? No   Do you need help preparing meals?  No   Do you need help with housework?  No   Do you need help with laundry? No   Do you need help taking your medications? No   Do you need help managing money? No   Do you ever drive or ride in a car without wearing a seat belt? No   Have you felt unusual stress, anger or loneliness in the last month? No   Who do you live with? Spouse   If you need help, do you have trouble finding someone available to you? No   Have you been bothered in the last four weeks by sexual problems? No   Do you have difficulty concentrating, remembering or making decisions? No         Does the patient have evidence of cognitive impairment? No    Asprin use counseling:Taking ASA appropriately as indicated    Age-appropriate Screening Schedule:  Refer to the list below for future screening recommendations based on patient's age, sex and/or medical conditions. Orders for these recommended tests are listed in the plan section. The patient has  been provided with a written plan.    Health Maintenance   Topic Date Due   • DXA SCAN  11/28/2019   • LIPID PANEL  11/11/2020   • TDAP/TD VACCINES (2 - Td) 05/16/2021   • MAMMOGRAM  12/02/2021   • COLONOSCOPY  04/01/2024   • INFLUENZA VACCINE  Completed   • ZOSTER VACCINE  Completed          The following portions of the patient's history were reviewed and updated as appropriate: allergies, current medications, past family history, past medical history, past social history, past surgical history and problem list.    Outpatient Medications Prior to Visit   Medication Sig Dispense Refill   • aspirin 81 MG EC tablet Take 1 tablet by mouth Daily. 60 tablet 1   • fluticasone (FLONASE) 50 MCG/ACT nasal spray 2 sprays into each nostril Daily As Needed.     • loratadine (CLARITIN) 10 MG tablet Take 10 mg by mouth Daily.     • metoprolol succinate XL (TOPROL-XL) 25 MG 24 hr tablet TAKE ONE TABLET BY MOUTH DAILY 30 tablet 9   • Multiple Vitamins-Minerals (MULTIVITAMIN ADULTS PO) Take 1 tablet by mouth Daily.     • vitamin D (ERGOCALCIFEROL) 1.25 MG (04587 UT) capsule capsule Take 1 capsule by mouth Every 7 (Seven) Days. 12 capsule 3   • amoxicillin (AMOXIL) 500 MG capsule Take all 4 pills 1 hour prior to dental procedure 4 capsule 0   • calcipotriene-betamethasone (TACLONEX) 0.005-0.064 % external suspension Apply  topically Daily.     • nystatin-triamcinolone (MYCOLOG) 460719-5.1 UNIT/GM-% ointment Apply  topically to the appropriate area as directed 2 (Two) Times a Day. 30 g 2     Facility-Administered Medications Prior to Visit   Medication Dose Route Frequency Provider Last Rate Last Dose   • Chlorhexidine Gluconate Cloth 2 % pads 1 application  1 application Topical Q12H PRN Kaylah Prakash APRN           Patient Active Problem List   Diagnosis   • Psoriasis   • Paget's bone disease   • Perennial allergic rhinitis   • PAT (paroxysmal atrial tachycardia) (CMS/HCC)   • Migraine without aura and without status  "migrainosus, not intractable   • IGT (impaired glucose tolerance)   • Osteopenia   • Vitamin D deficiency   • Left carotid stenosis   • S/P AVR (aortic valve replacement)       Advanced Care Planning:  ACP discussion was held with the patient during this visit. Patient does not have an advance directive, information provided.    Review of Systems    Compared to one year ago, the patient feels her physical health is worse.  Compared to one year ago, the patient feels her mental health is the same.    Reviewed chart for potential of high risk medication in the elderly: yes  Reviewed chart for potential of harmful drug interactions in the elderly:yes    Objective         Vitals:    11/13/20 1330   BP: 142/60   Pulse: 79   Resp: 16   Temp: 96.8 °F (36 °C)   TempSrc: Infrared   SpO2: 100%   Weight: 87.1 kg (192 lb)   Height: 165.1 cm (65\")   PainSc: 0-No pain       Body mass index is 31.95 kg/m².  Discussed the patient's BMI with her. The BMI is above average; BMI management plan is completed.    Physical Exam          Assessment/Plan   Medicare Risks and Personalized Health Plan  CMS Preventative Services Quick Reference  Advance Directive Discussion  Immunizations Discussed/Encouraged (specific immunizations; Pneumococcal 23 )  Inactivity/Sedentary    The above risks/problems have been discussed with the patient.  Pertinent information has been shared with the patient in the After Visit Summary.  Follow up plans and orders are seen below in the Assessment/Plan Section.    There are no diagnoses linked to this encounter.  Follow Up:  No follow-ups on file.     An After Visit Summary and PPPS were given to the patient.             "

## 2020-11-14 LAB
ALBUMIN SERPL-MCNC: 4.5 G/DL (ref 3.5–5.2)
ALBUMIN/GLOB SERPL: 1.7 G/DL
ALP SERPL-CCNC: 91 U/L (ref 39–117)
ALT SERPL-CCNC: 30 U/L (ref 1–33)
AST SERPL-CCNC: 26 U/L (ref 1–32)
BASOPHILS # BLD AUTO: 0.07 10*3/MM3 (ref 0–0.2)
BASOPHILS NFR BLD AUTO: 1.1 % (ref 0–1.5)
BILIRUB SERPL-MCNC: 0.3 MG/DL (ref 0–1.2)
BUN SERPL-MCNC: 11 MG/DL (ref 8–23)
BUN/CREAT SERPL: 10.4 (ref 7–25)
CALCIUM SERPL-MCNC: 9.5 MG/DL (ref 8.6–10.5)
CHLORIDE SERPL-SCNC: 101 MMOL/L (ref 98–107)
CHOLEST SERPL-MCNC: 231 MG/DL (ref 0–200)
CHOLEST/HDLC SERPL: 4.62 {RATIO}
CO2 SERPL-SCNC: 29.2 MMOL/L (ref 22–29)
CREAT SERPL-MCNC: 1.06 MG/DL (ref 0.57–1)
EOSINOPHIL # BLD AUTO: 0.29 10*3/MM3 (ref 0–0.4)
EOSINOPHIL NFR BLD AUTO: 4.4 % (ref 0.3–6.2)
ERYTHROCYTE [DISTWIDTH] IN BLOOD BY AUTOMATED COUNT: 14.8 % (ref 12.3–15.4)
GLOBULIN SER CALC-MCNC: 2.7 GM/DL
GLUCOSE SERPL-MCNC: 99 MG/DL (ref 65–99)
HBA1C MFR BLD: 5.7 % (ref 4.8–5.6)
HCT VFR BLD AUTO: 40.5 % (ref 34–46.6)
HDLC SERPL-MCNC: 50 MG/DL (ref 40–60)
HGB BLD-MCNC: 13.1 G/DL (ref 12–15.9)
IMM GRANULOCYTES # BLD AUTO: 0.02 10*3/MM3 (ref 0–0.05)
IMM GRANULOCYTES NFR BLD AUTO: 0.3 % (ref 0–0.5)
LDLC SERPL CALC-MCNC: 139 MG/DL (ref 0–100)
LYMPHOCYTES # BLD AUTO: 2.56 10*3/MM3 (ref 0.7–3.1)
LYMPHOCYTES NFR BLD AUTO: 39 % (ref 19.6–45.3)
MCH RBC QN AUTO: 27.9 PG (ref 26.6–33)
MCHC RBC AUTO-ENTMCNC: 32.3 G/DL (ref 31.5–35.7)
MCV RBC AUTO: 86.4 FL (ref 79–97)
MONOCYTES # BLD AUTO: 0.58 10*3/MM3 (ref 0.1–0.9)
MONOCYTES NFR BLD AUTO: 8.8 % (ref 5–12)
NEUTROPHILS # BLD AUTO: 3.05 10*3/MM3 (ref 1.7–7)
NEUTROPHILS NFR BLD AUTO: 46.4 % (ref 42.7–76)
NRBC BLD AUTO-RTO: 0 /100 WBC (ref 0–0.2)
PLATELET # BLD AUTO: 220 10*3/MM3 (ref 140–450)
POTASSIUM SERPL-SCNC: 4.7 MMOL/L (ref 3.5–5.2)
PROT SERPL-MCNC: 7.2 G/DL (ref 6–8.5)
RBC # BLD AUTO: 4.69 10*6/MM3 (ref 3.77–5.28)
SODIUM SERPL-SCNC: 140 MMOL/L (ref 136–145)
TRIGL SERPL-MCNC: 236 MG/DL (ref 0–150)
VLDLC SERPL CALC-MCNC: 42 MG/DL (ref 5–40)
WBC # BLD AUTO: 6.57 10*3/MM3 (ref 3.4–10.8)

## 2020-11-17 ENCOUNTER — HOSPITAL ENCOUNTER (OUTPATIENT)
Dept: CARDIOLOGY | Facility: HOSPITAL | Age: 65
Discharge: HOME | End: 2020-11-17
Admitting: INTERNAL MEDICINE

## 2020-11-17 ENCOUNTER — OFFICE VISIT (OUTPATIENT)
Dept: CARDIOLOGY | Facility: CLINIC | Age: 65
End: 2020-11-17

## 2020-11-17 VITALS
WEIGHT: 185 LBS | SYSTOLIC BLOOD PRESSURE: 172 MMHG | HEART RATE: 83 BPM | HEIGHT: 65 IN | BODY MASS INDEX: 30.82 KG/M2 | DIASTOLIC BLOOD PRESSURE: 94 MMHG

## 2020-11-17 VITALS
WEIGHT: 189 LBS | HEIGHT: 65 IN | SYSTOLIC BLOOD PRESSURE: 172 MMHG | HEART RATE: 79 BPM | DIASTOLIC BLOOD PRESSURE: 94 MMHG | BODY MASS INDEX: 31.49 KG/M2

## 2020-11-17 DIAGNOSIS — Z95.2 S/P AVR (AORTIC VALVE REPLACEMENT): ICD-10-CM

## 2020-11-17 DIAGNOSIS — Z95.2 S/P AVR (AORTIC VALVE REPLACEMENT): Primary | ICD-10-CM

## 2020-11-17 LAB
AORTIC ARCH: 2.2 CM
AORTIC DIMENSIONLESS INDEX: 0.4 (DI)
ASCENDING AORTA: 3.2 CM
BH CV ECHO AV AORTIC VALVE AT ACCEL TIME CALCULATED: NORMAL MSEC
BH CV ECHO MEAS - AI DEC SLOPE: 319 CM/SEC^2
BH CV ECHO MEAS - AI MAX PG: 72.9 MMHG
BH CV ECHO MEAS - AI MAX VEL: 427 CM/SEC
BH CV ECHO MEAS - AI P1/2T: 392.1 MSEC
BH CV ECHO MEAS - AO ACC TIME: 0.07 SEC
BH CV ECHO MEAS - AO MAX PG (FULL): 21.9 MMHG
BH CV ECHO MEAS - AO MAX PG: 25.8 MMHG
BH CV ECHO MEAS - AO MEAN PG (FULL): 11 MMHG
BH CV ECHO MEAS - AO MEAN PG: 13 MMHG
BH CV ECHO MEAS - AO ROOT AREA (BSA CORRECTED): 1.8
BH CV ECHO MEAS - AO ROOT AREA: 9.6 CM^2
BH CV ECHO MEAS - AO ROOT DIAM: 3.5 CM
BH CV ECHO MEAS - AO V2 MAX: 254 CM/SEC
BH CV ECHO MEAS - AO V2 MEAN: 172 CM/SEC
BH CV ECHO MEAS - AO V2 VTI: 50.1 CM
BH CV ECHO MEAS - ASC AORTA: 3.2 CM
BH CV ECHO MEAS - AT: 71 SEC
BH CV ECHO MEAS - AVA(I,A): 1.3 CM^2
BH CV ECHO MEAS - AVA(I,D): 1.3 CM^2
BH CV ECHO MEAS - AVA(V,A): 1.2 CM^2
BH CV ECHO MEAS - AVA(V,D): 1.2 CM^2
BH CV ECHO MEAS - BSA(HAYCOCK): 2 M^2
BH CV ECHO MEAS - BSA: 1.9 M^2
BH CV ECHO MEAS - BZI_BMI: 30.8 KILOGRAMS/M^2
BH CV ECHO MEAS - BZI_METRIC_HEIGHT: 165.1 CM
BH CV ECHO MEAS - BZI_METRIC_WEIGHT: 83.9 KG
BH CV ECHO MEAS - EDV(MOD-SP2): 61 ML
BH CV ECHO MEAS - EDV(MOD-SP4): 59 ML
BH CV ECHO MEAS - EDV(TEICH): 65.9 ML
BH CV ECHO MEAS - EF(CUBED): 70.4 %
BH CV ECHO MEAS - EF(MOD-BP): 62.1 %
BH CV ECHO MEAS - EF(MOD-SP2): 65.6 %
BH CV ECHO MEAS - EF(MOD-SP4): 57.6 %
BH CV ECHO MEAS - EF(TEICH): 62.7 %
BH CV ECHO MEAS - ESV(MOD-SP2): 21 ML
BH CV ECHO MEAS - ESV(MOD-SP4): 25 ML
BH CV ECHO MEAS - ESV(TEICH): 24.6 ML
BH CV ECHO MEAS - FS: 33.3 %
BH CV ECHO MEAS - IVS/LVPW: 1
BH CV ECHO MEAS - IVSD: 1.2 CM
BH CV ECHO MEAS - LAT PEAK E' VEL: 8.4 CM/SEC
BH CV ECHO MEAS - LV DIASTOLIC VOL/BSA (35-75): 30.8 ML/M^2
BH CV ECHO MEAS - LV MASS(C)D: 159.3 GRAMS
BH CV ECHO MEAS - LV MASS(C)DI: 83.2 GRAMS/M^2
BH CV ECHO MEAS - LV MAX PG: 3.9 MMHG
BH CV ECHO MEAS - LV MEAN PG: 2 MMHG
BH CV ECHO MEAS - LV SYSTOLIC VOL/BSA (12-30): 13.1 ML/M^2
BH CV ECHO MEAS - LV V1 MAX: 98.3 CM/SEC
BH CV ECHO MEAS - LV V1 MEAN: 69 CM/SEC
BH CV ECHO MEAS - LV V1 VTI: 21 CM
BH CV ECHO MEAS - LVIDD: 3.9 CM
BH CV ECHO MEAS - LVIDS: 2.6 CM
BH CV ECHO MEAS - LVLD AP2: 6.4 CM
BH CV ECHO MEAS - LVLD AP4: 6.9 CM
BH CV ECHO MEAS - LVLS AP2: 5.5 CM
BH CV ECHO MEAS - LVLS AP4: 5.9 CM
BH CV ECHO MEAS - LVOT AREA (M): 3.1 CM^2
BH CV ECHO MEAS - LVOT AREA: 3.1 CM^2
BH CV ECHO MEAS - LVOT DIAM: 2 CM
BH CV ECHO MEAS - LVPWD: 1.2 CM
BH CV ECHO MEAS - MED PEAK E' VEL: 4.4 CM/SEC
BH CV ECHO MEAS - MR MAX PG: 132.7 MMHG
BH CV ECHO MEAS - MR MAX VEL: 576 CM/SEC
BH CV ECHO MEAS - MV A DUR: 0.12 SEC
BH CV ECHO MEAS - MV A MAX VEL: 83.6 CM/SEC
BH CV ECHO MEAS - MV DEC SLOPE: 256 CM/SEC^2
BH CV ECHO MEAS - MV DEC TIME: 0.26 SEC
BH CV ECHO MEAS - MV E MAX VEL: 51.4 CM/SEC
BH CV ECHO MEAS - MV E/A: 0.61
BH CV ECHO MEAS - MV MAX PG: 3.8 MMHG
BH CV ECHO MEAS - MV MEAN PG: 2 MMHG
BH CV ECHO MEAS - MV P1/2T MAX VEL: 79.9 CM/SEC
BH CV ECHO MEAS - MV P1/2T: 91.4 MSEC
BH CV ECHO MEAS - MV V2 MAX: 97.4 CM/SEC
BH CV ECHO MEAS - MV V2 MEAN: 66.1 CM/SEC
BH CV ECHO MEAS - MV V2 VTI: 24.7 CM
BH CV ECHO MEAS - MVA P1/2T LCG: 2.8 CM^2
BH CV ECHO MEAS - MVA(P1/2T): 2.4 CM^2
BH CV ECHO MEAS - MVA(VTI): 2.7 CM^2
BH CV ECHO MEAS - PA ACC TIME: 0.08 SEC
BH CV ECHO MEAS - PA MAX PG (FULL): 2.6 MMHG
BH CV ECHO MEAS - PA MAX PG: 3.6 MMHG
BH CV ECHO MEAS - PA PR(ACCEL): 44.4 MMHG
BH CV ECHO MEAS - PA V2 MAX: 95 CM/SEC
BH CV ECHO MEAS - PULM A REVS DUR: 0.11 SEC
BH CV ECHO MEAS - PULM A REVS VEL: 30.4 CM/SEC
BH CV ECHO MEAS - PULM DIAS VEL: 33 CM/SEC
BH CV ECHO MEAS - PULM S/D: 1.4
BH CV ECHO MEAS - PULM SYS VEL: 47.6 CM/SEC
BH CV ECHO MEAS - PVA(V,A): 1.7 CM^2
BH CV ECHO MEAS - PVA(V,D): 1.7 CM^2
BH CV ECHO MEAS - QP/QS: 0.41
BH CV ECHO MEAS - RAP SYSTOLE: 8 MMHG
BH CV ECHO MEAS - RV MAX PG: 1 MMHG
BH CV ECHO MEAS - RV MEAN PG: 1 MMHG
BH CV ECHO MEAS - RV V1 MAX: 50.3 CM/SEC
BH CV ECHO MEAS - RV V1 MEAN: 33.5 CM/SEC
BH CV ECHO MEAS - RV V1 VTI: 8.6 CM
BH CV ECHO MEAS - RVOT AREA: 3.1 CM^2
BH CV ECHO MEAS - RVOT DIAM: 2 CM
BH CV ECHO MEAS - RVSP: 22 MMHG
BH CV ECHO MEAS - SI(AO): 251.9 ML/M^2
BH CV ECHO MEAS - SI(CUBED): 21.8 ML/M^2
BH CV ECHO MEAS - SI(LVOT): 34.5 ML/M^2
BH CV ECHO MEAS - SI(MOD-SP2): 20.9 ML/M^2
BH CV ECHO MEAS - SI(MOD-SP4): 17.8 ML/M^2
BH CV ECHO MEAS - SI(TEICH): 21.6 ML/M^2
BH CV ECHO MEAS - SUP REN AO DIAM: 2 CM
BH CV ECHO MEAS - SV(AO): 482 ML
BH CV ECHO MEAS - SV(CUBED): 41.7 ML
BH CV ECHO MEAS - SV(LVOT): 66 ML
BH CV ECHO MEAS - SV(MOD-SP2): 40 ML
BH CV ECHO MEAS - SV(MOD-SP4): 34 ML
BH CV ECHO MEAS - SV(RVOT): 27 ML
BH CV ECHO MEAS - SV(TEICH): 41.3 ML
BH CV ECHO MEAS - TAPSE (>1.6): 1.8 CM
BH CV ECHO MEAS - TR MAX VEL: 187 CM/SEC
BH CV ECHO MEASUREMENTS AVERAGE E/E' RATIO: 8.03
BH CV XLRA - RV BASE: 1.8 CM
BH CV XLRA - RV LENGTH: 5.9 CM
BH CV XLRA - RV MID: 1.5 CM
BH CV XLRA - TDI S': 12.8 CM/SEC
LEFT ATRIUM VOLUME INDEX: 23 ML/M2
MAXIMAL PREDICTED HEART RATE: 154 BPM
SINUS: 3 CM
STJ: 3.2 CM
STRESS TARGET HR: 131 BPM

## 2020-11-17 PROCEDURE — 93306 TTE W/DOPPLER COMPLETE: CPT

## 2020-11-17 PROCEDURE — 93306 TTE W/DOPPLER COMPLETE: CPT | Performed by: INTERNAL MEDICINE

## 2020-11-17 PROCEDURE — 99214 OFFICE O/P EST MOD 30 MIN: CPT | Performed by: INTERNAL MEDICINE

## 2020-11-17 RX ORDER — METOPROLOL SUCCINATE 25 MG/1
25 TABLET, EXTENDED RELEASE ORAL DAILY
Qty: 90 TABLET | Refills: 3 | Status: SHIPPED | OUTPATIENT
Start: 2020-11-17 | End: 2021-11-24

## 2020-11-17 NOTE — PROGRESS NOTES
Subjective:     Encounter Date:11/12/2019      Patient ID: Chastity Berrios is a 64 y.o. female.    Chief Complaint: Aortic valve replacement/history of SVT.  History of Present Illness    64-year-old female who presents today for reevaluation.  Clinically she is doing fine.  Dr. Small had noticed that her murmur had increased during auscultation.  With that she was set up for an echo and was sent over here.  Patient also said she has been little bit more short of breath than she was last year.  Between these 2 factors obviously we need to repeat an echocardiogram which was done today really did not show much of a difference.  Patient does however say that her pressures been running a little bit higher she has not been exercising the past 6 months due to the pandemic he is to go to the gym all the time but stopped doing that.    Review of Systems   All other systems reviewed and are negative.      Procedures    Interpretation Summary 11/6/19    · Left ventricular wall thickness is consistent with mild concentric hypertrophy.  · Calculated EF = 63%.  · Left ventricular systolic function is normal.  · Global Longitudinal LV strain = -23%.  · Mild aortic valve regurgitation is present.  · There is a 23 mm porcine bioprosthetic valve present. The aortic valve peak and mean gradients are within defined limits. The aortic insufficiency does not appear to be perivalvular or at a coaptation of the leaflets.     Interpretation [id number]/17/2020    · Calculated left ventricular EF = 62.1% Estimated left ventricular EF was in agreement with the calculated left ventricular EF. Left ventricular systolic function is normal.  · Left ventricular wall thickness is consistent with concentric hypertrophy.  · There is a bioprosthetic aortic valve present. Mild paravalvular regurgitation is present in the prosthetic aortic valve.  · Peak velocity of the flow distal to the aortic valve is 254 cm/s. Aortic valve maximum pressure gradient  is 25.8 mmHg. Aortic valve mean pressure gradient is 13 mmHg. Aortic valve dimensionless index is 0.4 .  · Aortic valve area is 1.3 cm2.  · Mild tricuspid valve regurgitation is present  · Estimated right ventricular systolic pressure from tricuspid regurgitation is normal (<35 mmHg). Calculated right ventricular systolic pressure from tricuspid regurgitation is 22 mmHg.  · Compared with prior on 11/6/2019 no significant difference             Objective:     Physical Exam   Constitutional: She is oriented to person, place, and time. She appears well-developed.   HENT:   Head: Normocephalic.   Eyes: Conjunctivae are normal.   Neck: Normal range of motion.   Cardiovascular: Normal rate, regular rhythm and normal heart sounds.   Pulmonary/Chest: Breath sounds normal.   Abdominal: Soft. Bowel sounds are normal.   Musculoskeletal: Normal range of motion.         General: No edema.   Neurological: She is alert and oriented to person, place, and time.   Skin: Skin is warm and dry.   Psychiatric: She has a normal mood and affect. Her behavior is normal.   Vitals reviewed.      Lab Review:       Assessment:          Diagnosis Plan   1. S/P AVR (aortic valve replacement)            Plan:         1.  Status post aortic valve replacement.  Echo noted above clinically doing well no issues.  2.  SVT status post ablation no recurrence.  3.  Blood pressures are higher than normal.  Today it was exceptionally high 172/94.  She says been running about 150s.  Due to the pandemic though she has not been exercising prior to stopping exercising her blood pressure has been running in the 120s range.  Encourage her to follow closely resume exercise obviously staying socially distant and if her blood pressure does improve to contact either me or Dr. Small's office.  I also believe that is why Dr. Small noted a increase volume of her murmur could have been from a higher pressure.  4.  Continue same follow-up in 1 to 2 years sooner if issues  should arise

## 2020-12-01 ENCOUNTER — OFFICE VISIT (OUTPATIENT)
Dept: INTERNAL MEDICINE | Facility: CLINIC | Age: 65
End: 2020-12-01

## 2020-12-01 DIAGNOSIS — Z23 NEED FOR VACCINATION: Primary | ICD-10-CM

## 2020-12-01 PROCEDURE — G0009 ADMIN PNEUMOCOCCAL VACCINE: HCPCS | Performed by: INTERNAL MEDICINE

## 2020-12-01 PROCEDURE — 90732 PPSV23 VACC 2 YRS+ SUBQ/IM: CPT | Performed by: INTERNAL MEDICINE

## 2020-12-01 NOTE — PATIENT INSTRUCTIONS
Pneumococcal Polysaccharide Vaccine (PPSV23): What You Need to Know  1. Why get vaccinated?  Pneumococcal polysaccharide vaccine (PPSV23) can prevent pneumococcal disease.  Pneumococcal disease refers to any illness caused by pneumococcal bacteria. These bacteria can cause many types of illnesses, including pneumonia, which is an infection of the lungs. Pneumococcal bacteria are one of the most common causes of pneumonia.  Besides pneumonia, pneumococcal bacteria can also cause:  · Ear infections  · Sinus infections  · Meningitis (infection of the tissue covering the brain and spinal cord)  · Bacteremia (bloodstream infection)  Anyone can get pneumococcal disease, but children under 2 years of age, people with certain medical conditions, adults 65 years or older, and cigarette smokers are at the highest risk.  Most pneumococcal infections are mild. However, some can result in long-term problems, such as brain damage or hearing loss. Meningitis, bacteremia, and pneumonia caused by pneumococcal disease can be fatal.  2. PPSV23  PPSV23 protects against 23 types of bacteria that cause pneumococcal disease.  PPSV23 is recommended for:  · All adults 65 years or older,  · Anyone 2 years or older with certain medical conditions that can lead to an increased risk for pneumococcal disease.  Most people need only one dose of PPSV23. A second dose of PPSV23, and another type of pneumococcal vaccine called PCV13, are recommended for certain high-risk groups. Your health care provider can give you more information.  People 65 years or older should get a dose of PPSV23 even if they have already gotten one or more doses of the vaccine before they turned 65.  3. Talk with your health care provider  Tell your vaccine provider if the person getting the vaccine:  · Has had an allergic reaction after a previous dose of PPSV23, or has any severe, life-threatening allergies.  In some cases, your health care provider may decide to postpone  PPSV23 vaccination to a future visit.  People with minor illnesses, such as a cold, may be vaccinated. People who are moderately or severely ill should usually wait until they recover before getting PPSV23.  Your health care provider can give you more information.  4. Risks of a vaccine reaction  · Redness or pain where the shot is given, feeling tired, fever, or muscle aches can happen after PPSV23.  People sometimes faint after medical procedures, including vaccination. Tell your provider if you feel dizzy or have vision changes or ringing in the ears.  As with any medicine, there is a very remote chance of a vaccine causing a severe allergic reaction, other serious injury, or death.  5. What if there is a serious problem?  An allergic reaction could occur after the vaccinated person leaves the clinic. If you see signs of a severe allergic reaction (hives, swelling of the face and throat, difficulty breathing, a fast heartbeat, dizziness, or weakness), call 9-1-1 and get the person to the nearest hospital.  For other signs that concern you, call your health care provider.  Adverse reactions should be reported to the Vaccine Adverse Event Reporting System (VAERS). Your health care provider will usually file this report, or you can do it yourself. Visit the VAERS website at www.vaers.hhs.gov or call 1-250.911.6965. VAERS is only for reporting reactions, and VAERS staff do not give medical advice.  6. How can I learn more?  · Ask your health care provider.  · Call your local or state health department.  · Contact the Centers for Disease Control and Prevention (CDC):  ? Call 1-787.182.2321 (1-869-REA-INFO) or  ? Visit CDC's website at www.cdc.gov/vaccines  CDC Vaccine Information Statement PPSV23 Vaccine (10/30/2019)  This information is not intended to replace advice given to you by your health care provider. Make sure you discuss any questions you have with your health care provider.  Document Revised: 04/07/2020  Document Reviewed: 07/30/2019  [employer]vier Patient Education © 2020 [employer]

## 2021-01-16 ENCOUNTER — HOSPITAL ENCOUNTER (OUTPATIENT)
Facility: HOSPITAL | Age: 66
Setting detail: OBSERVATION
Discharge: HOME | End: 2021-01-17
Attending: EMERGENCY MEDICINE | Admitting: INTERNAL MEDICINE

## 2021-01-16 ENCOUNTER — APPOINTMENT (OUTPATIENT)
Dept: CT IMAGING | Facility: HOSPITAL | Age: 66
End: 2021-01-16

## 2021-01-16 DIAGNOSIS — K57.20 DIVERTICULITIS OF LARGE INTESTINE WITH ABSCESS WITHOUT BLEEDING: Primary | ICD-10-CM

## 2021-01-16 LAB
ALBUMIN SERPL-MCNC: 4.2 G/DL (ref 3.5–5.2)
ALBUMIN/GLOB SERPL: 1.2 G/DL
ALP SERPL-CCNC: 85 U/L (ref 39–117)
ALT SERPL W P-5'-P-CCNC: 25 U/L (ref 1–33)
ANION GAP SERPL CALCULATED.3IONS-SCNC: 11.2 MMOL/L (ref 5–15)
AST SERPL-CCNC: 23 U/L (ref 1–32)
BACTERIA UR QL AUTO: NORMAL /HPF
BASOPHILS # BLD AUTO: 0.06 10*3/MM3 (ref 0–0.2)
BASOPHILS NFR BLD AUTO: 0.6 % (ref 0–1.5)
BILIRUB SERPL-MCNC: 0.4 MG/DL (ref 0–1.2)
BILIRUB UR QL STRIP: NEGATIVE
BUN SERPL-MCNC: 11 MG/DL (ref 8–23)
BUN/CREAT SERPL: 10 (ref 7–25)
CALCIUM SPEC-SCNC: 9.3 MG/DL (ref 8.6–10.5)
CHLORIDE SERPL-SCNC: 102 MMOL/L (ref 98–107)
CLARITY UR: CLEAR
CO2 SERPL-SCNC: 25.8 MMOL/L (ref 22–29)
COLOR UR: YELLOW
CREAT SERPL-MCNC: 1.1 MG/DL (ref 0.57–1)
D-LACTATE SERPL-SCNC: 1 MMOL/L (ref 0.5–2)
DEPRECATED RDW RBC AUTO: 42.5 FL (ref 37–54)
EOSINOPHIL # BLD AUTO: 0.17 10*3/MM3 (ref 0–0.4)
EOSINOPHIL NFR BLD AUTO: 1.6 % (ref 0.3–6.2)
ERYTHROCYTE [DISTWIDTH] IN BLOOD BY AUTOMATED COUNT: 13.9 % (ref 12.3–15.4)
GFR SERPL CREATININE-BSD FRML MDRD: 50 ML/MIN/1.73
GLOBULIN UR ELPH-MCNC: 3.4 GM/DL
GLUCOSE SERPL-MCNC: 107 MG/DL (ref 65–99)
GLUCOSE UR STRIP-MCNC: NEGATIVE MG/DL
HCT VFR BLD AUTO: 40.2 % (ref 34–46.6)
HGB BLD-MCNC: 12.8 G/DL (ref 12–15.9)
HGB UR QL STRIP.AUTO: NEGATIVE
HYALINE CASTS UR QL AUTO: NORMAL /LPF
IMM GRANULOCYTES # BLD AUTO: 0.06 10*3/MM3 (ref 0–0.05)
IMM GRANULOCYTES NFR BLD AUTO: 0.6 % (ref 0–0.5)
KETONES UR QL STRIP: NEGATIVE
LEUKOCYTE ESTERASE UR QL STRIP.AUTO: ABNORMAL
LIPASE SERPL-CCNC: 48 U/L (ref 13–60)
LYMPHOCYTES # BLD AUTO: 2.72 10*3/MM3 (ref 0.7–3.1)
LYMPHOCYTES NFR BLD AUTO: 25.8 % (ref 19.6–45.3)
MCH RBC QN AUTO: 26.9 PG (ref 26.6–33)
MCHC RBC AUTO-ENTMCNC: 31.8 G/DL (ref 31.5–35.7)
MCV RBC AUTO: 84.5 FL (ref 79–97)
MONOCYTES # BLD AUTO: 0.84 10*3/MM3 (ref 0.1–0.9)
MONOCYTES NFR BLD AUTO: 8 % (ref 5–12)
NEUTROPHILS NFR BLD AUTO: 6.69 10*3/MM3 (ref 1.7–7)
NEUTROPHILS NFR BLD AUTO: 63.4 % (ref 42.7–76)
NITRITE UR QL STRIP: NEGATIVE
NRBC BLD AUTO-RTO: 0.1 /100 WBC (ref 0–0.2)
PH UR STRIP.AUTO: 7 [PH] (ref 5–8)
PLATELET # BLD AUTO: 249 10*3/MM3 (ref 140–450)
PMV BLD AUTO: 10.3 FL (ref 6–12)
POTASSIUM SERPL-SCNC: 4.2 MMOL/L (ref 3.5–5.2)
PROT SERPL-MCNC: 7.6 G/DL (ref 6–8.5)
PROT UR QL STRIP: NEGATIVE
RBC # BLD AUTO: 4.76 10*6/MM3 (ref 3.77–5.28)
RBC # UR: NORMAL /HPF
REF LAB TEST METHOD: NORMAL
SARS-COV-2 RNA RESP QL NAA+PROBE: NOT DETECTED
SODIUM SERPL-SCNC: 139 MMOL/L (ref 136–145)
SP GR UR STRIP: 1.01 (ref 1–1.03)
SQUAMOUS #/AREA URNS HPF: NORMAL /HPF
UROBILINOGEN UR QL STRIP: ABNORMAL
WBC # BLD AUTO: 10.54 10*3/MM3 (ref 3.4–10.8)
WBC UR QL AUTO: NORMAL /HPF

## 2021-01-16 PROCEDURE — G0378 HOSPITAL OBSERVATION PER HR: HCPCS

## 2021-01-16 PROCEDURE — 80053 COMPREHEN METABOLIC PANEL: CPT | Performed by: EMERGENCY MEDICINE

## 2021-01-16 PROCEDURE — 96361 HYDRATE IV INFUSION ADD-ON: CPT

## 2021-01-16 PROCEDURE — 81001 URINALYSIS AUTO W/SCOPE: CPT | Performed by: EMERGENCY MEDICINE

## 2021-01-16 PROCEDURE — 99284 EMERGENCY DEPT VISIT MOD MDM: CPT

## 2021-01-16 PROCEDURE — 85025 COMPLETE CBC W/AUTO DIFF WBC: CPT | Performed by: EMERGENCY MEDICINE

## 2021-01-16 PROCEDURE — 99203 OFFICE O/P NEW LOW 30 MIN: CPT | Performed by: SURGERY

## 2021-01-16 PROCEDURE — 87040 BLOOD CULTURE FOR BACTERIA: CPT | Performed by: EMERGENCY MEDICINE

## 2021-01-16 PROCEDURE — 83605 ASSAY OF LACTIC ACID: CPT | Performed by: EMERGENCY MEDICINE

## 2021-01-16 PROCEDURE — 25010000003 CEFTRIAXONE PER 250 MG: Performed by: HOSPITALIST

## 2021-01-16 PROCEDURE — 96365 THER/PROPH/DIAG IV INF INIT: CPT

## 2021-01-16 PROCEDURE — 96367 TX/PROPH/DG ADDL SEQ IV INF: CPT

## 2021-01-16 PROCEDURE — C9803 HOPD COVID-19 SPEC COLLECT: HCPCS

## 2021-01-16 PROCEDURE — 25010000002 IOPAMIDOL 61 % SOLUTION: Performed by: EMERGENCY MEDICINE

## 2021-01-16 PROCEDURE — U0003 INFECTIOUS AGENT DETECTION BY NUCLEIC ACID (DNA OR RNA); SEVERE ACUTE RESPIRATORY SYNDROME CORONAVIRUS 2 (SARS-COV-2) (CORONAVIRUS DISEASE [COVID-19]), AMPLIFIED PROBE TECHNIQUE, MAKING USE OF HIGH THROUGHPUT TECHNOLOGIES AS DESCRIBED BY CMS-2020-01-R: HCPCS | Performed by: EMERGENCY MEDICINE

## 2021-01-16 PROCEDURE — 83690 ASSAY OF LIPASE: CPT | Performed by: EMERGENCY MEDICINE

## 2021-01-16 PROCEDURE — 74177 CT ABD & PELVIS W/CONTRAST: CPT

## 2021-01-16 RX ORDER — SODIUM CHLORIDE 9 MG/ML
100 INJECTION, SOLUTION INTRAVENOUS CONTINUOUS
Status: DISCONTINUED | OUTPATIENT
Start: 2021-01-16 | End: 2021-01-17 | Stop reason: HOSPADM

## 2021-01-16 RX ORDER — LEVOFLOXACIN 5 MG/ML
750 INJECTION, SOLUTION INTRAVENOUS ONCE
Status: DISCONTINUED | OUTPATIENT
Start: 2021-01-16 | End: 2021-01-16

## 2021-01-16 RX ORDER — HYDROCODONE BITARTRATE AND ACETAMINOPHEN 5; 325 MG/1; MG/1
1 TABLET ORAL EVERY 4 HOURS PRN
Status: DISCONTINUED | OUTPATIENT
Start: 2021-01-16 | End: 2021-01-17 | Stop reason: HOSPADM

## 2021-01-16 RX ORDER — ONDANSETRON 4 MG/1
4 TABLET, FILM COATED ORAL EVERY 6 HOURS PRN
Status: DISCONTINUED | OUTPATIENT
Start: 2021-01-16 | End: 2021-01-17 | Stop reason: HOSPADM

## 2021-01-16 RX ORDER — CETIRIZINE HYDROCHLORIDE 10 MG/1
10 TABLET ORAL DAILY
Status: DISCONTINUED | OUTPATIENT
Start: 2021-01-16 | End: 2021-01-17 | Stop reason: HOSPADM

## 2021-01-16 RX ORDER — CEFTRIAXONE SODIUM 2 G/50ML
2 INJECTION, SOLUTION INTRAVENOUS EVERY 24 HOURS
Status: DISCONTINUED | OUTPATIENT
Start: 2021-01-16 | End: 2021-01-16

## 2021-01-16 RX ORDER — MORPHINE SULFATE 2 MG/ML
1 INJECTION, SOLUTION INTRAMUSCULAR; INTRAVENOUS EVERY 4 HOURS PRN
Status: DISCONTINUED | OUTPATIENT
Start: 2021-01-16 | End: 2021-01-17 | Stop reason: HOSPADM

## 2021-01-16 RX ORDER — UREA 10 %
3 LOTION (ML) TOPICAL NIGHTLY PRN
Status: DISCONTINUED | OUTPATIENT
Start: 2021-01-16 | End: 2021-01-17 | Stop reason: HOSPADM

## 2021-01-16 RX ORDER — SODIUM CHLORIDE 0.9 % (FLUSH) 0.9 %
10 SYRINGE (ML) INJECTION AS NEEDED
Status: DISCONTINUED | OUTPATIENT
Start: 2021-01-16 | End: 2021-01-16

## 2021-01-16 RX ORDER — AMOXICILLIN 250 MG
1 CAPSULE ORAL 2 TIMES DAILY
Status: DISCONTINUED | OUTPATIENT
Start: 2021-01-16 | End: 2021-01-17 | Stop reason: HOSPADM

## 2021-01-16 RX ORDER — ONDANSETRON 2 MG/ML
4 INJECTION INTRAMUSCULAR; INTRAVENOUS EVERY 6 HOURS PRN
Status: DISCONTINUED | OUTPATIENT
Start: 2021-01-16 | End: 2021-01-17 | Stop reason: HOSPADM

## 2021-01-16 RX ORDER — METOPROLOL SUCCINATE 25 MG/1
25 TABLET, EXTENDED RELEASE ORAL DAILY
Status: DISCONTINUED | OUTPATIENT
Start: 2021-01-16 | End: 2021-01-17 | Stop reason: HOSPADM

## 2021-01-16 RX ORDER — ASPIRIN 81 MG/1
81 TABLET ORAL DAILY
Status: DISCONTINUED | OUTPATIENT
Start: 2021-01-16 | End: 2021-01-17 | Stop reason: HOSPADM

## 2021-01-16 RX ORDER — NALOXONE HCL 0.4 MG/ML
0.4 VIAL (ML) INJECTION
Status: DISCONTINUED | OUTPATIENT
Start: 2021-01-16 | End: 2021-01-17 | Stop reason: HOSPADM

## 2021-01-16 RX ORDER — ACETAMINOPHEN 325 MG/1
650 TABLET ORAL EVERY 4 HOURS PRN
Status: DISCONTINUED | OUTPATIENT
Start: 2021-01-16 | End: 2021-01-17 | Stop reason: HOSPADM

## 2021-01-16 RX ORDER — AMOXICILLIN AND CLAVULANATE POTASSIUM 875; 125 MG/1; MG/1
1 TABLET, FILM COATED ORAL EVERY 12 HOURS SCHEDULED
Status: DISCONTINUED | OUTPATIENT
Start: 2021-01-16 | End: 2021-01-17 | Stop reason: HOSPADM

## 2021-01-16 RX ADMIN — METRONIDAZOLE 500 MG: 500 INJECTION, SOLUTION INTRAVENOUS at 14:07

## 2021-01-16 RX ADMIN — CETIRIZINE HYDROCHLORIDE ALLERGY 10 MG: 10 TABLET ORAL at 17:56

## 2021-01-16 RX ADMIN — CEFTRIAXONE SODIUM 2 G: 2 INJECTION, SOLUTION INTRAVENOUS at 17:46

## 2021-01-16 RX ADMIN — SODIUM CHLORIDE 100 ML/HR: 9 INJECTION, SOLUTION INTRAVENOUS at 15:38

## 2021-01-16 RX ADMIN — ASPIRIN 81 MG: 81 TABLET, COATED ORAL at 17:56

## 2021-01-16 RX ADMIN — METOPROLOL SUCCINATE 25 MG: 25 TABLET, EXTENDED RELEASE ORAL at 17:56

## 2021-01-16 RX ADMIN — IOPAMIDOL 85 ML: 612 INJECTION, SOLUTION INTRAVENOUS at 12:49

## 2021-01-16 RX ADMIN — AMOXICILLIN AND CLAVULANATE POTASSIUM 1 TABLET: 875; 125 TABLET, FILM COATED ORAL at 21:29

## 2021-01-16 RX ADMIN — DOCUSATE SODIUM 50MG AND SENNOSIDES 8.6MG 1 TABLET: 8.6; 5 TABLET, FILM COATED ORAL at 21:29

## 2021-01-16 RX ADMIN — SODIUM CHLORIDE 500 ML: 9 INJECTION, SOLUTION INTRAVENOUS at 11:11

## 2021-01-16 NOTE — ED PROVIDER NOTES
EMERGENCY DEPARTMENT ENCOUNTER    Room Number:  12/12  Date of encounter:  1/16/2021  PCP: Alvaro Small MD  Historian: Patient     I used full protective equipment while examining this patient.  This includes face mask, gloves and protective eyewear.  I washed my hands before entering the room and immediately upon leaving the room      HPI:  Chief Complaint: Abdominal pain  A complete HPI/ROS/PMH/PSH/SH/FH are unobtainable due to: None    Context: Chastity Berrios is a 65 y.o. female who presents to the ED c/o abdominal pain.  Patient states her symptoms began about 7 days ago and have been fairly intermittent.  Pain lasts for several minutes at a time and comes out of the blue without known exacerbating or inciting cause.  Pain tends to go away without any specific treatment.  Pain is described as sharp and in the left lower quadrant.  She states that she did have constipation last week but took some Dulcolax and had a fairly large bowel movement afterwards without any problems.  She is having no dysuria or hematuria.  She is having really no nausea or vomiting.  She is had no recent fever.  Patient reports no prior abdominal surgeries.      PAST MEDICAL HISTORY  Active Ambulatory Problems     Diagnosis Date Noted   • Psoriasis 11/03/2016   • Paget's bone disease 11/03/2016   • Perennial allergic rhinitis 11/03/2016   • PAT (paroxysmal atrial tachycardia) (CMS/Formerly [facility]) 11/03/2016   • Migraine without aura and without status migrainosus, not intractable 11/03/2016   • IGT (impaired glucose tolerance) 11/03/2016   • Osteopenia 11/03/2016   • Vitamin D deficiency 11/03/2016   • Left carotid stenosis 03/09/2018   • S/P AVR (aortic valve replacement) 03/28/2018     Resolved Ambulatory Problems     Diagnosis Date Noted   • Aortic valve stenosis 02/20/2018     Past Medical History:   Diagnosis Date   • Angina pectoris (CMS/Formerly [facility])    • Aortic regurgitation    • Aortic systolic murmur on examination    • Aortic valvar stenosis     • Arrhythmia    • Arthralgia    • Chest pain    • Coronary artery disease    • Environmental allergies    • First degree AV block    • Heart palpitations    • Hyperlipidemia    • Migraines    • Mild concentric left ventricular hypertrophy (LVH)    • Mitral regurgitation    • PSVT (paroxysmal supraventricular tachycardia) (CMS/HCC)    • Pulmonic regurgitation    • Snoring    • SOB (shortness of breath)    • Spinal headache    • SVT (supraventricular tachycardia) (CMS/HCC)    • Wheezing          PAST SURGICAL HISTORY  Past Surgical History:   Procedure Laterality Date   • AORTIC VALVE REPAIR/REPLACEMENT N/A 2/27/2018    Procedure: EMANI MINI STERNOTOMY AORTIC VALVE REPLACEMENT PRP;  Surgeon: Jaen Claude Jones MD;  Location: [facility] MAIN OR;  Service:    • CARDIAC ABLATION  02/14/2014   • CARDIAC CATHETERIZATION  01/06/2014   • CARDIAC CATHETERIZATION N/A 1/24/2018    Procedure: Right and Left Heart Cath;  Surgeon: Fabián Morales MD;  Location: [facility] CATH INVASIVE LOCATION;  Service:    • CARDIAC CATHETERIZATION N/A 1/24/2018    Procedure: Coronary angiography;  Surgeon: Fabián Morales MD;  Location: [facility] CATH INVASIVE LOCATION;  Service:    • CARDIAC CATHETERIZATION N/A 1/24/2018    Procedure: Left ventriculography;  Surgeon: Fabián Morales MD;  Location: [facility] CATH INVASIVE LOCATION;  Service:    • CATARACT EXTRACTION     • TONSILLECTOMY           FAMILY HISTORY  Family History   Problem Relation Age of Onset   • Hyperlipidemia Mother    • Stroke Mother    • Hypertension Father    • Diabetes Father    • Pancreatic cancer Father    • Breast cancer Maternal Grandmother    • Diabetes Maternal Grandfather    • Lung cancer Maternal Grandfather    • Malig Hyperthermia Neg Hx          SOCIAL HISTORY  Social History     Socioeconomic History   • Marital status:      Spouse name: Edilson Berrios   • Number of children: 3   • Years of education: 17   • Highest education level: Not on file    Occupational History   • Occupation: housewife   Tobacco Use   • Smoking status: Never Smoker   • Smokeless tobacco: Never Used   Substance and Sexual Activity   • Alcohol use: Yes     Alcohol/week: 1.0 standard drinks     Types: 1 Glasses of wine per week     Frequency: 2-4 times a month     Drinks per session: 1 or 2     Binge frequency: Never     Comment: social / caffeine use   • Drug use: No   • Sexual activity: Defer         ALLERGIES  Patient has no known allergies.       REVIEW OF SYSTEMS  Review of Systems   Constitutional: Negative.  Negative for fever.   HENT: Negative.  Negative for sore throat.    Eyes: Negative.    Respiratory: Negative.  Negative for cough.    Cardiovascular: Negative.  Negative for chest pain.   Gastrointestinal: Positive for abdominal pain (As per HPI).   Genitourinary: Negative.  Negative for dysuria.   Musculoskeletal: Negative.  Negative for back pain.   Skin: Negative.  Negative for rash.   Neurological: Negative.  Negative for headaches.   All other systems reviewed and are negative.          PHYSICAL EXAM    I have reviewed the triage vital signs and nursing notes.    ED Triage Vitals   Temp Heart Rate Resp BP SpO2   01/16/21 1027 01/16/21 1027 01/16/21 1027 01/16/21 1045 01/16/21 1027   97.9 °F (36.6 °C) 116 16 161/67 97 %      Temp src Heart Rate Source Patient Position BP Location FiO2 (%)   01/16/21 1027 01/16/21 1027 -- -- --   Tympanic Monitor          Physical Exam  GENERAL: Alert female in no apparent distress.  Slightly tachycardic at triage.  HENT: nares patent  EYES: no scleral icterus  CV: regular rhythm, regular rate-no murmur, heart rate in the 90s upon my exam  RESPIRATORY: normal effort, clear to auscultation bilaterally-O2 saturations 98% on room air  ABDOMEN: soft, mild tenderness palpation in the left lower quadrant-no rebound or guarding  MUSCULOSKELETAL: no deformity  NEURO: Strength, sensation, and coordination are grossly intact.  Speech and mentation  are unremarkable  SKIN: warm, dry      LAB RESULTS  Recent Results (from the past 24 hour(s))   Comprehensive Metabolic Panel    Collection Time: 01/16/21 10:57 AM    Specimen: Blood   Result Value Ref Range    Glucose 107 (H) 65 - 99 mg/dL    BUN 11 8 - 23 mg/dL    Creatinine 1.10 (H) 0.57 - 1.00 mg/dL    Sodium 139 136 - 145 mmol/L    Potassium 4.2 3.5 - 5.2 mmol/L    Chloride 102 98 - 107 mmol/L    CO2 25.8 22.0 - 29.0 mmol/L    Calcium 9.3 8.6 - 10.5 mg/dL    Total Protein 7.6 6.0 - 8.5 g/dL    Albumin 4.20 3.50 - 5.20 g/dL    ALT (SGPT) 25 1 - 33 U/L    AST (SGOT) 23 1 - 32 U/L    Alkaline Phosphatase 85 39 - 117 U/L    Total Bilirubin 0.4 0.0 - 1.2 mg/dL    eGFR Non African Amer 50 (L) >60 mL/min/1.73    Globulin 3.4 gm/dL    A/G Ratio 1.2 g/dL    BUN/Creatinine Ratio 10.0 7.0 - 25.0    Anion Gap 11.2 5.0 - 15.0 mmol/L   Lipase    Collection Time: 01/16/21 10:57 AM    Specimen: Blood   Result Value Ref Range    Lipase 48 13 - 60 U/L   CBC Auto Differential    Collection Time: 01/16/21 10:57 AM    Specimen: Blood   Result Value Ref Range    WBC 10.54 3.40 - 10.80 10*3/mm3    RBC 4.76 3.77 - 5.28 10*6/mm3    Hemoglobin 12.8 12.0 - 15.9 g/dL    Hematocrit 40.2 34.0 - 46.6 %    MCV 84.5 79.0 - 97.0 fL    MCH 26.9 26.6 - 33.0 pg    MCHC 31.8 31.5 - 35.7 g/dL    RDW 13.9 12.3 - 15.4 %    RDW-SD 42.5 37.0 - 54.0 fl    MPV 10.3 6.0 - 12.0 fL    Platelets 249 140 - 450 10*3/mm3    Neutrophil % 63.4 42.7 - 76.0 %    Lymphocyte % 25.8 19.6 - 45.3 %    Monocyte % 8.0 5.0 - 12.0 %    Eosinophil % 1.6 0.3 - 6.2 %    Basophil % 0.6 0.0 - 1.5 %    Immature Grans % 0.6 (H) 0.0 - 0.5 %    Neutrophils, Absolute 6.69 1.70 - 7.00 10*3/mm3    Lymphocytes, Absolute 2.72 0.70 - 3.10 10*3/mm3    Monocytes, Absolute 0.84 0.10 - 0.90 10*3/mm3    Eosinophils, Absolute 0.17 0.00 - 0.40 10*3/mm3    Basophils, Absolute 0.06 0.00 - 0.20 10*3/mm3    Immature Grans, Absolute 0.06 (H) 0.00 - 0.05 10*3/mm3    nRBC 0.1 0.0 - 0.2 /100 WBC    Urinalysis With Microscopic If Indicated (No Culture) - Urine, Clean Catch    Collection Time: 01/16/21 11:11 AM    Specimen: Urine, Clean Catch   Result Value Ref Range    Color, UA Yellow Yellow, Straw    Appearance, UA Clear Clear    pH, UA 7.0 5.0 - 8.0    Specific Gravity, UA 1.010 1.005 - 1.030    Glucose, UA Negative Negative    Ketones, UA Negative Negative    Bilirubin, UA Negative Negative    Blood, UA Negative Negative    Protein, UA Negative Negative    Leuk Esterase, UA Trace (A) Negative    Nitrite, UA Negative Negative    Urobilinogen, UA 0.2 E.U./dL 0.2 - 1.0 E.U./dL   Urinalysis, Microscopic Only - Urine, Clean Catch    Collection Time: 01/16/21 11:11 AM    Specimen: Urine, Clean Catch   Result Value Ref Range    RBC, UA 0-2 None Seen, 0-2 /HPF    WBC, UA 0-2 None Seen, 0-2 /HPF    Bacteria, UA None Seen None Seen /HPF    Squamous Epithelial Cells, UA 0-2 None Seen, 0-2 /HPF    Hyaline Casts, UA None Seen None Seen /LPF    Methodology Automated Microscopy        Ordered the above labs and independently reviewed the results.      RADIOLOGY  Ct Abdomen Pelvis With Contrast    Result Date: 1/16/2021  CT ABDOMEN PELVIS W CONTRAST-  INDICATIONS: Left lower quadrant pain  TECHNIQUE: Radiation dose reduction techniques were utilized, including automated exposure control and exposure modulation based on body size. Enhanced ABDOMEN AND PELVIS CT  COMPARISON: Abdomen CT from 07/06/2004  FINDINGS:  Mild nonspecific chronic nodular thickening of the adrenal glands.  Relative low-density of the liver suggests steatosis.    Otherwise unremarkable appearance of the liver, spleen, adrenal glands, pancreas, kidneys, bladder.  No bowel obstruction. Colonic diverticula are seen, with proximal sigmoid acute diverticulitis. Wall thickening at this level, 1.2 cm, presumably reactive in nature, follow-up evaluation with direct visualization recommended to exclude any possibility of underlying lesion. A small abscess is  apparent along the posterior margin of proximal sigmoid colon at this level, 1.0 x 1.2 x 1.3 cm, for example axial image 101.  No free intraperitoneal gas or free fluid.  Scattered small mesenteric and para-aortic lymph nodes are seen that are not significant by size criteria.  Abdominal aorta is not aneurysmal. Aortic and other arterial calcifications are present.  The lung bases are clear.  Degenerative changes are seen in the spine. No acute fracture is identified.         Proximal sigmoid diverticulitis with wall thickening and small abscess at the posterior margin of the colon wall.  Discussed by telephone with Dr. Hayes at 1302, 01/16/2021  This report was finalized on 1/16/2021 1:06 PM by Dr. Cong Evans M.D.        I ordered the above noted radiological studies. Reviewed by me and discussed with radiologist.  See dictation for official radiology interpretation.      PROCEDURES  Procedures      MEDICATIONS GIVEN IN ER    Medications   sodium chloride 0.9 % flush 10 mL (has no administration in time range)   levoFLOXacin (LEVAQUIN) 750 mg/150 mL D5W (premix) (LEVAQUIN) 750 mg (has no administration in time range)   metroNIDAZOLE (FLAGYL) 500 mg/100mL IVPB (500 mg Intravenous New Bag 1/16/21 1407)   sodium chloride 0.9 % bolus 500 mL (0 mL Intravenous Stopped 1/16/21 1349)   iopamidol (ISOVUE-300) 61 % injection 100 mL (85 mL Intravenous Given by Other 1/16/21 1249)         PROGRESS, DATA ANALYSIS, CONSULTS, AND MEDICAL DECISION MAKING    All labs have been independently reviewed by me.  All radiology studies have been reviewed by me and discussed with radiologist dictating the report.   EKG's independently viewed and interpreted by me.  Discussion below represents my analysis of pertinent findings related to patient's condition, differential diagnosis, treatment plan and final disposition.      ED Course as of Jan 16 1409   Sat Jan 16, 2021   1053 [id number]-old female presents with intermittent left  lower quadrant pain lasting minutes at a time ongoing for about 7 days off and on.  Exam is fairly benign with mild tenderness to palpation.  Differential diagnosis would include but is not limited to diverticulitis, colitis and kidney stone.    [DB]   1154 Reviewed labs which are fairly unremarkable.  White count is 10.5.  Urinalysis and chemistries are fairly benign.  At this point with patient having tenderness in the left lower quadrant and no clear cause we will get CT scan for further evaluation.    [DB]   1313 I discussed CT scan of the abdomen with Dr. Evans.  He does report that there is changes consistent with sigmoid diverticulitis with small abscess.    [DB]   1314 I discussed at length results of CT scan with patient.  I did recommend admission to the hospital if she does have a noted abscess.  However this abscess is small and is posterior and may not be amenable to CT-guided drainage.  While I recommended admission I told her that going home is certainly a possibility although she could certainly get worse and end up needing come back to the hospital.  She will think about it and discuss with her  whether she would rather stay in the hospital or go home and be treated as an outpatient.    [DB]   1338 Patient would like to be hospitalized for further evaluation treatment.  We will give IV Levaquin and Flagyl to cover for acute diverticulitis with abscess.  Will consult hospitalist about admission.    [DB]   1407 Ambreen evaluation this patient with Dr. Deshaun Shepherd who will admit to the hospital for further evaluation treatment.    [DB]      ED Course User Index  [DB] Matias Hayes MD       AS OF 14:09 EST VITALS:    BP - 161/67  HR - 116  TEMP - 97.9 °F (36.6 °C) (Tympanic)  O2 SATS - 97%      DIAGNOSIS  Final diagnoses:   Diverticulitis of large intestine with abscess without bleeding         DISPOSITION  Admission       Matias Hayes MD  01/16/21 2125

## 2021-01-16 NOTE — ED TRIAGE NOTES
LLQ abd. Pain x 1 week states originally thought was constipated, used laxatives and has had several bowel movements with no relief in abd. Pain. Patient masked in first look triage. I was wearing mask and goggles.

## 2021-01-16 NOTE — H&P
Patient Name:  Chastity Berrios  YOB: 1955  MRN:  [id number]  Admit Date:  1/16/2021  Patient Care Team:  Alvaro Small MD as PCP - General (Internal Medicine)  Alvaro Small MD as PCP - Internal Medicine  Jose Bonner MD as Consulting Physician (Cardiology)  Fabián Morales MD as Consulting Physician (Cardiology)      Subjective   History Present Illness     Chief Complaint   Patient presents with   • Abdominal Pain       Ms. Berrios is a 65 y.o. female with a history of CAD and tissue AVR that presents to [facility] complaining of abdominal pain for the last 7 days worse over last 2 days.  Started with constipation last week.      Abdominal Pain  This is a new problem. The current episode started in the past 7 days. The onset quality is gradual. The problem occurs intermittently. The problem has been gradually worsening. The pain is located in the LLQ. The pain is severe. The quality of the pain is sharp. The abdominal pain does not radiate. Associated symptoms include constipation. Pertinent negatives include no fever. The pain is aggravated by eating, palpation and certain positions. Relieved by: holding pressure over it with hand. She has tried nothing for the symptoms. Prior diagnostic workup includes CT scan.     Review of Systems   Constitutional: Negative.  Negative for fever.   HENT: Negative.    Respiratory: Negative.    Cardiovascular: Negative.    Gastrointestinal: Positive for abdominal pain and constipation.        See above   Endocrine: Negative.    Genitourinary: Negative.    Musculoskeletal: Negative.    Skin: Negative.    Neurological: Negative.    Hematological: Negative.    Psychiatric/Behavioral: Negative.         Personal History     Past Medical History:   Diagnosis Date   • Angina pectoris (CMS/HCC)    • Aortic regurgitation     trace to mild   • Aortic systolic murmur on examination    • Aortic valvar stenosis    • Arrhythmia    • Arthralgia      both thumbs are getting stiff   • Chest pain    • Coronary artery disease    • Environmental allergies    • First degree AV block    • Heart palpitations    • Hyperlipidemia    • IGT (impaired glucose tolerance)    • Left carotid stenosis 3/9/2018   • Migraines    • Mild concentric left ventricular hypertrophy (LVH)    • Mitral regurgitation     trivial to mild   • Psoriasis     ELBOWS CALOS AND LEGS CALOS   • PSVT (paroxysmal supraventricular tachycardia) (CMS/HCC)    • Pulmonic regurgitation     trace   • Snoring    • SOB (shortness of breath)    • Spinal headache     AFTER CHILDBIRTH   • SVT (supraventricular tachycardia) (CMS/HCC)    • Wheezing     NOTHING CURRENT     Past Surgical History:   Procedure Laterality Date   • AORTIC VALVE REPAIR/REPLACEMENT N/A 2/27/2018    Procedure: EMANI MINI STERNOTOMY AORTIC VALVE REPLACEMENT PRP;  Surgeon: Jean Claude Jones MD;  Location: [facility] OR;  Service:    • CARDIAC ABLATION  02/14/2014   • CARDIAC CATHETERIZATION  01/06/2014   • CARDIAC CATHETERIZATION N/A 1/24/2018    Procedure: Right and Left Heart Cath;  Surgeon: Fabián Morales MD;  Location: [facility] INVASIVE LOCATION;  Service:    • CARDIAC CATHETERIZATION N/A 1/24/2018    Procedure: Coronary angiography;  Surgeon: Fabián Morales MD;  Location: [facility] CATH INVASIVE LOCATION;  Service:    • CARDIAC CATHETERIZATION N/A 1/24/2018    Procedure: Left ventriculography;  Surgeon: Fabián Morales MD;  Location: [facility] CATH INVASIVE LOCATION;  Service:    • CATARACT EXTRACTION     • TONSILLECTOMY       Family History   Problem Relation Age of Onset   • Hyperlipidemia Mother    • Stroke Mother    • Hypertension Father    • Diabetes Father    • Pancreatic cancer Father    • Breast cancer Maternal Grandmother    • Diabetes Maternal Grandfather    • Lung cancer Maternal Grandfather    • Malig Hyperthermia Neg Hx      Social History     Tobacco Use   • Smoking status: Never Smoker   • Smokeless tobacco:  Never Used   Substance Use Topics   • Alcohol use: Yes     Alcohol/week: 1.0 standard drinks     Types: 1 Glasses of wine per week     Frequency: 2-4 times a month     Drinks per session: 1 or 2     Binge frequency: Never     Comment: social / caffeine use   • Drug use: No     Current Facility-Administered Medications on File Prior to Encounter   Medication Dose Route Frequency Provider Last Rate Last Admin   • Chlorhexidine Gluconate Cloth 2 % pads 1 application  1 application Topical Q12H PRN Kaylah Prakash APRN         Current Outpatient Medications on File Prior to Encounter   Medication Sig Dispense Refill   • aspirin 81 MG EC tablet Take 1 tablet by mouth Daily. 60 tablet 1   • fluticasone (FLONASE) 50 MCG/ACT nasal spray 2 sprays into each nostril Daily As Needed.     • loratadine (CLARITIN) 10 MG tablet Take 10 mg by mouth Daily.     • metoprolol succinate XL (TOPROL-XL) 25 MG 24 hr tablet Take 1 tablet by mouth Daily. 90 tablet 3   • Multiple Vitamins-Minerals (MULTIVITAMIN ADULTS PO) Take 1 tablet by mouth Daily.     • vitamin D (ERGOCALCIFEROL) 1.25 MG (02978 UT) capsule capsule Take 1 capsule by mouth Every 7 (Seven) Days. 12 capsule 3   • amoxicillin (AMOXIL) 500 MG capsule Take all 4 pills 1 hour prior to dental procedure 4 capsule 0     No Known Allergies    Objective    Objective     Vital Signs  Temp:  [97.9 °F (36.6 °C)-99.5 °F (37.5 °C)] 97.9 °F (36.6 °C)  Heart Rate:  [] 79  Resp:  [16-18] 16  BP: (153-168)/(67-85) 153/76  SpO2:  [95 %-97 %] 96 %  on   ;   Device (Oxygen Therapy): room air  Body mass index is 30.79 kg/m².    Physical Exam  Vitals signs and nursing note reviewed.   Constitutional:       General: She is not in acute distress.     Appearance: She is well-developed.   HENT:      Head: Normocephalic and atraumatic.   Eyes:      General: No scleral icterus.     Conjunctiva/sclera: Conjunctivae normal.   Neck:      Musculoskeletal: Normal range of motion.      Vascular: No JVD.    Cardiovascular:      Rate and Rhythm: Regular rhythm. Tachycardia present.   Pulmonary:      Effort: Pulmonary effort is normal.      Breath sounds: Normal breath sounds.   Abdominal:      General: Bowel sounds are normal. There is no distension.      Palpations: Abdomen is soft.      Tenderness: There is abdominal tenderness in the left lower quadrant. There is no guarding or rebound.   Musculoskeletal: Normal range of motion.   Skin:     General: Skin is warm and dry.   Neurological:      Mental Status: She is alert and oriented to person, place, and time.   Psychiatric:         Behavior: Behavior normal.         Results Review:  I reviewed the patient's new clinical results.  I reviewed the patient's new imaging results and agree with the interpretation.  I reviewed the patient's other test results and agree with the interpretation  I personally viewed and interpreted the patient's EKG/Telemetry data  Discussed with ED provider.    Lab Results (last 24 hours)     Procedure Component Value Units Date/Time    CBC & Differential [[id number]]  (Abnormal) Collected: 01/16/21 1057    Specimen: Blood Updated: 01/16/21 1111    Narrative:      The following orders were created for panel order CBC & Differential.  Procedure                               Abnormality         Status                     ---------                               -----------         ------                     CBC Auto Differential[[id number]]        Abnormal            Final result                 Please view results for these tests on the individual orders.    Comprehensive Metabolic Panel [[id number]]  (Abnormal) Collected: 01/16/21 1057    Specimen: Blood Updated: 01/16/21 1130     Glucose 107 mg/dL      BUN 11 mg/dL      Creatinine 1.10 mg/dL      Sodium 139 mmol/L      Potassium 4.2 mmol/L      Chloride 102 mmol/L      CO2 25.8 mmol/L      Calcium 9.3 mg/dL      Total Protein 7.6 g/dL      Albumin 4.20 g/dL      ALT (SGPT) 25 U/L      AST  (SGOT) 23 U/L      Alkaline Phosphatase 85 U/L      Total Bilirubin 0.4 mg/dL      eGFR Non African Amer 50 mL/min/1.73      Globulin 3.4 gm/dL      A/G Ratio 1.2 g/dL      BUN/Creatinine Ratio 10.0     Anion Gap 11.2 mmol/L     Narrative:      GFR Normal >60  Chronic Kidney Disease <60  Kidney Failure <15      Lipase [[id number]]  (Normal) Collected: 01/16/21 1057    Specimen: Blood Updated: 01/16/21 1130     Lipase 48 U/L     CBC Auto Differential [[id number]]  (Abnormal) Collected: 01/16/21 1057    Specimen: Blood Updated: 01/16/21 1111     WBC 10.54 10*3/mm3      RBC 4.76 10*6/mm3      Hemoglobin 12.8 g/dL      Hematocrit 40.2 %      MCV 84.5 fL      MCH 26.9 pg      MCHC 31.8 g/dL      RDW 13.9 %      RDW-SD 42.5 fl      MPV 10.3 fL      Platelets 249 10*3/mm3      Neutrophil % 63.4 %      Lymphocyte % 25.8 %      Monocyte % 8.0 %      Eosinophil % 1.6 %      Basophil % 0.6 %      Immature Grans % 0.6 %      Neutrophils, Absolute 6.69 10*3/mm3      Lymphocytes, Absolute 2.72 10*3/mm3      Monocytes, Absolute 0.84 10*3/mm3      Eosinophils, Absolute 0.17 10*3/mm3      Basophils, Absolute 0.06 10*3/mm3      Immature Grans, Absolute 0.06 10*3/mm3      nRBC 0.1 /100 WBC     Urinalysis With Microscopic If Indicated (No Culture) - Urine, Clean Catch [[id number]]  (Abnormal) Collected: 01/16/21 1111    Specimen: Urine, Clean Catch Updated: 01/16/21 1131     Color, UA Yellow     Appearance, UA Clear     pH, UA 7.0     Specific Gravity, UA 1.010     Glucose, UA Negative     Ketones, UA Negative     Bilirubin, UA Negative     Blood, UA Negative     Protein, UA Negative     Leuk Esterase, UA Trace     Nitrite, UA Negative     Urobilinogen, UA 0.2 E.U./dL    Urinalysis, Microscopic Only - Urine, Clean Catch [[id number]] Collected: 01/16/21 1111    Specimen: Urine, Clean Catch Updated: 01/16/21 1131     RBC, UA 0-2 /HPF      WBC, UA 0-2 /HPF      Bacteria, UA None Seen /HPF      Squamous Epithelial Cells, UA 0-2 /HPF       Hyaline Casts, UA None Seen /LPF      Methodology Automated Microscopy    Blood Culture - Blood, Arm, Right [[id number]] Collected: 01/16/21 1404    Specimen: Blood from Arm, Right Updated: 01/16/21 1412    Blood Culture - Blood, Arm, Left [[id number]] Collected: 01/16/21 1404    Specimen: Blood from Arm, Left Updated: 01/16/21 1412    Lactic Acid, Plasma [[id number]]  (Normal) Collected: 01/16/21 1404    Specimen: Blood Updated: 01/16/21 1432     Lactate 1.0 mmol/L     COVID PRE-OP / PRE-PROCEDURE SCREENING ORDER (NO ISOLATION) - Swab, Nasopharynx [[id number]] Collected: 01/16/21 1406    Specimen: Swab from Nasopharynx Updated: 01/16/21 1444    Narrative:      The following orders were created for panel order COVID PRE-OP / PRE-PROCEDURE SCREENING ORDER (NO ISOLATION) - Swab, Nasopharynx.  Procedure                               Abnormality         Status                     ---------                               -----------         ------                     COVID-19,[facility] IN[facility]HOUSE...[[id number]]                      In process                   Please view results for these tests on the individual orders.    COVID-19,[facility]HOU[facility] CEPHEID, NP SWAB IN TRANSPORT MEDIA 8-12 HR TAT - Swab, Nasopharynx [[id number]] Collected: 01/16/21 1406    Specimen: Swab from Nasopharynx Updated: 01/16/21 1444          Imaging Results (Last 24 Hours)     Procedure Component Value Units Date/Time    CT Abdomen Pelvis With Contrast [[id number]] Collected: 01/16/21 1258     Updated: 01/16/21 1309    Narrative:      CT ABDOMEN PELVIS W CONTRAST-     INDICATIONS: Left lower quadrant pain     TECHNIQUE: Radiation dose reduction techniques were utilized, including  automated exposure control and exposure modulation based on body size.  Enhanced ABDOMEN AND PELVIS CT     COMPARISON: Abdomen CT from 07/06/2004     FINDINGS:     Mild nonspecific chronic nodular thickening of the adrenal glands.     Relative low-density of the liver suggests  steatosis.           Otherwise unremarkable appearance of the liver, spleen, adrenal glands,  pancreas, kidneys, bladder.     No bowel obstruction. Colonic diverticula are seen, with proximal  sigmoid acute diverticulitis. Wall thickening at this level, 1.2 cm,  presumably reactive in nature, follow-up evaluation with direct  visualization recommended to exclude any possibility of underlying  lesion. A small abscess is apparent along the posterior margin of  proximal sigmoid colon at this level, 1.0 x 1.2 x 1.3 cm, for example  axial image 101.     No free intraperitoneal gas or free fluid.     Scattered small mesenteric and para-aortic lymph nodes are seen that are  not significant by size criteria.     Abdominal aorta is not aneurysmal. Aortic and other arterial  calcifications are present.     The lung bases are clear.     Degenerative changes are seen in the spine. No acute fracture is  identified.             Impression:         Proximal sigmoid diverticulitis with wall thickening and small abscess  at the posterior margin of the colon wall.     Discussed by telephone with Dr. Hayes at 1302, 01/16/2021     This report was finalized on 1/16/2021 1:06 PM by Dr. Cong Evans M.D.             Results for orders placed during the hospital encounter of 11/17/20   Adult Transthoracic Echo Complete W/ Cont if Necessary Per Protocol    Narrative · Calculated left ventricular EF = 62.1% Estimated left ventricular EF was   in agreement with the calculated left ventricular EF. Left ventricular   systolic function is normal.  · Left ventricular wall thickness is consistent with concentric   hypertrophy.  · There is a bioprosthetic aortic valve present. Mild paravalvular   regurgitation is present in the prosthetic aortic valve.  · Peak velocity of the flow distal to the aortic valve is 254 cm/s. Aortic   valve maximum pressure gradient is 25.8 mmHg. Aortic valve mean pressure   gradient is 13 mmHg. Aortic valve  dimensionless index is 0.4 .  · Aortic valve area is 1.3 cm2.  · Mild tricuspid valve regurgitation is present  · Estimated right ventricular systolic pressure from tricuspid   regurgitation is normal (<35 mmHg). Calculated right ventricular systolic   pressure from tricuspid regurgitation is 22 mmHg.  · Compared with prior on 11/6/2019 no significant difference        No orders to display        Assessment/Plan     Active Hospital Problems    Diagnosis  POA   • **Diverticulitis of large intestine with abscess without bleeding [K57.20]  Yes   • Coronary artery disease [I25.10]  Yes   • S/P AVR (aortic valve replacement) [Z95.2]  Not Applicable      Resolved Hospital Problems   No resolved problems to display.       65 y.o. female admitted with Diverticulitis of large intestine with abscess without bleeding.    She received Levaquin and Flagyl in the ED.  Will continue Flagyl and change her Levaquin to ceftriaxone.  Her exam is benign.  There is no evidence of sepsis or peritonitis.  Given presence of abscess will ask general surgery to see in consultation.  Will allow liquid diet for now.      · SCDs for DVT prophylaxis.  · Full code.  · Discussed with patient and ED provider.      Deshaun Shepherd MD  [facility]  01/16/21  14:46 EST

## 2021-01-16 NOTE — CONSULTS
Consultation note    Referring physician: Dr. Shepherd    Consulting Physician: Ishaan Burt MD    Reason for consultation: Acute sigmoid diverticulitis with small abscess formation    Chief Complaint   Patient presents with   • Abdominal Pain       HPI:   The patient is a very pleasant 65 y.o. years old female that presented to the hospital with abdominal pain.  The patient reported constipation that started 2 days ago associated with left lower quadrant abdominal pain.  The pain was mild to moderate and did not radiate.  Mostly located in the left lower quadrant.  The pain was sharp in nature.  No exacerbating or alleviating factors.  These were associated with constipation that improved with stool softeners and laxative.  She has 2 bowel movements at the beginning of the week but this did not improve her pain.  She decided to come to emergency room for further treatment.  She was found to have acute sigmoid diverticulitis with small abscess formation on CT scan.  She has been tolerating clear liquid diet and does not have any abdominal pain if sitting.  No prior history of diverticulitis.  Recall having colonoscopy approximately 7 years ago.  She does not have history of known diverticulosis.      Past Medical History:   Diagnosis Date   • Angina pectoris (CMS/HCC)    • Aortic regurgitation     trace to mild   • Aortic systolic murmur on examination    • Aortic valvar stenosis    • Arrhythmia    • Arthralgia     both thumbs are getting stiff   • Chest pain    • Coronary artery disease    • Environmental allergies    • First degree AV block    • Heart palpitations    • Hyperlipidemia    • IGT (impaired glucose tolerance)    • Left carotid stenosis 3/9/2018   • Migraines    • Mild concentric left ventricular hypertrophy (LVH)    • Mitral regurgitation     trivial to mild   • Psoriasis     ELBOWS CALOS AND LEGS CALOS   • PSVT (paroxysmal supraventricular tachycardia) (CMS/HCC)    • Pulmonic regurgitation     trace   •  Snoring    • SOB (shortness of breath)    • Spinal headache     AFTER CHILDBIRTH   • SVT (supraventricular tachycardia) (CMS/HCC)    • Wheezing     NOTHING CURRENT       Past Surgical History:   Procedure Laterality Date   • AORTIC VALVE REPAIR/REPLACEMENT N/A 2/27/2018    Procedure: EMANI MINI STERNOTOMY AORTIC VALVE REPLACEMENT PRP;  Surgeon: Jean Claude Jones MD;  Location: [facility] OR;  Service:    • CARDIAC ABLATION  02/14/2014   • CARDIAC CATHETERIZATION  01/06/2014   • CARDIAC CATHETERIZATION N/A 1/24/2018    Procedure: Right and Left Heart Cath;  Surgeon: Fabián Morales MD;  Location: [facility] CATH INVASIVE LOCATION;  Service:    • CARDIAC CATHETERIZATION N/A 1/24/2018    Procedure: Coronary angiography;  Surgeon: Fabián Morales MD;  Location: [facility] CATH INVASIVE LOCATION;  Service:    • CARDIAC CATHETERIZATION N/A 1/24/2018    Procedure: Left ventriculography;  Surgeon: Fabián Morales MD;  Location: [facility] CATH INVASIVE LOCATION;  Service:    • CATARACT EXTRACTION     • TONSILLECTOMY           Current Facility-Administered Medications:   •  acetaminophen (TYLENOL) tablet 650 mg, 650 mg, Oral, Q4H PRN, Deshaun Shepherd MD  •  aspirin EC tablet 81 mg, 81 mg, Oral, Daily, Deshaun Shepherd MD, 81 mg at 01/16/21 1756  •  cefTRIAXone (ROCEPHIN) IVPB 2 g, 2 g, Intravenous, Q24H, Deshaun Shepherd MD, Last Rate: 100 mL/hr at 01/16/21 1746, 2 g at 01/16/21 1746  •  cetirizine (zyrTEC) tablet 10 mg, 10 mg, Oral, Daily, Deshaun Shepherd MD, 10 mg at 01/16/21 1756  •  HYDROcodone-acetaminophen (NORCO) 5-325 MG per tablet 1 tablet, 1 tablet, Oral, Q4H PRN, Deshaun Shepherd MD  •  melatonin tablet 3 mg, 3 mg, Oral, Nightly PRN, Deshaun Shepherd MD  •  metoprolol succinate XL (TOPROL-XL) 24 hr tablet 25 mg, 25 mg, Oral, Daily, Deshaun Shepherd MD, 25 mg at 01/16/21 1756  •  metroNIDAZOLE (FLAGYL) 500 mg/100mL IVPB, 500 mg, Intravenous, Q8H, Deshaun Shepherd MD  •  morphine injection 1 mg, 1 mg, Intravenous, Q4H PRN  **AND** naloxone (NARCAN) injection 0.4 mg, 0.4 mg, Intravenous, Q5 Min PRN, Deshaun Shepherd MD  •  ondansetron (ZOFRAN) tablet 4 mg, 4 mg, Oral, Q6H PRN **OR** ondansetron (ZOFRAN) injection 4 mg, 4 mg, Intravenous, Q6H PRN, Deshaun Shepherd MD  •  sodium chloride 0.9 % infusion, 100 mL/hr, Intravenous, Continuous, Deshaun Shepherd MD, Last Rate: 100 mL/hr at 01/16/21 1538, 100 mL/hr at 01/16/21 1538    Facility-Administered Medications Ordered in Other Encounters:   •  Chlorhexidine Gluconate Cloth 2 % pads 1 application, 1 application, Topical, Q12H PRN, Kaylah Prakash APRN    No Known Allergies    Social History     Socioeconomic History   • Marital status:      Spouse name: Edilson Berrios   • Number of children: 3   • Years of education: 17   • Highest education level: Not on file   Occupational History   • Occupation: housewife   Tobacco Use   • Smoking status: Never Smoker   • Smokeless tobacco: Never Used   Substance and Sexual Activity   • Alcohol use: Yes     Alcohol/week: 1.0 standard drinks     Types: 1 Glasses of wine per week     Frequency: 2-4 times a month     Drinks per session: 1 or 2     Binge frequency: Never     Comment: social / caffeine use   • Drug use: No   • Sexual activity: Defer       Family History   Problem Relation Age of Onset   • Hyperlipidemia Mother    • Stroke Mother    • Hypertension Father    • Diabetes Father    • Pancreatic cancer Father    • Breast cancer Maternal Grandmother    • Diabetes Maternal Grandfather    • Lung cancer Maternal Grandfather    • Malig Hyperthermia Neg Hx        ROS:   Constitutional: denies any weight changes, fatigue or weakness.  Eyes: : denies blurred or double vision  Cardiovascular: denies chest pain, palpitations, edemas.  Respiratory: denies cough, sputum, SOB.  Gastrointestinal: Ports abdominal pain and constipation.  Genitourinary: denies dysuria, frequency.  Endocrine: denies cold intolerance, lethargy and flushing.  Hem: denies excessive  bruising and postop bleeding.  Musculoskeletal: denies weakness, joint swelling, pain or stiffness.  Neuro: denies seizures, CVA, paresthesia, or peripheral neuropathy.   Skin: denies change in nevi, rashes, masses.    Physical Exam:   Vitals:    01/16/21 1754   BP: 132/68   Pulse: 78   Resp: 16   Temp: 98.2 °F (36.8 °C)   SpO2: 96%     GENERAL:alert, well appearing, and in no distress and oriented to person, place, and time  HEENT: normochephalic, atraumatic, no scleral icterus moist mucous membranes.  NECK: Supple there is no thyromegaly or lymphadenopathy  CHEST: clear to auscultation, no wheezes, rales or rhonchi, symmetric air entry  CARDIAC: regular rate and rhythm    ABDOMEN: soft,  nondistended, no masses or organomegaly.  Mild tenderness noticed in the left lower quadrant, no rebound or guarding no peritoneal signs, no hernia  EXTREMITIES: no cyanosis, clubbing or edema    NEURO: alert and oriented, normal speech, cranial nerves 2-12 grossly intact, no focal deficits   SKIN: Moist, warm, no rashes.    Diagnostic workup:   CT scan abdomen and pelvis 1/16/2021.  There is colonic diverticuli in the descending and sigmoid colon.  There is evidence of acute diverticulitis with wall thickening at the proximal and mid sigmoid colon.  There is a loculated collection at the posterior aspect of the proximal sigmoid colon.  The fallopian tube ends at that area so this is a small abscess versus a ovarian cyst.  AAA    COVID-19 negative  White blood cell count 10.5, hemoglobin 12.8  Normal platelets, creatinine 1.1, otherwise normal CMP    Assessment and plan:   The patient is a very pleasant 65 y.o. years old female with acute sigmoid diverticulitis with may be small abscess formation versus ovarian cyst.  Patient is doing great clinically and does not have any signs or symptoms of severe disease.  Discussed with patient about further step.  She should have diet as tolerated with antibiotics that she will continue for  15 days.  She will need to be on a high-fiber diet and Metamucil as well as stool softeners and laxative as needed for bowel movements.  Patient will follow-up in my office in 6 weeks and I will schedule her to have colonoscopy at that time.  I doubt she will need to have any type of surgical procedure during this admission.    Case was discussed with patient    Risk and benefits of the current recommended treatment were explained to the patient that had time to ask questions that where answered, verbalized understanding and agreed with the plan.     Ishaan Burt MD  General, Minimally Invasive and Endoscopic Surgery  [facility]    [address], [address]  [place], [place], [address]  P: [phone]  F: [phone]

## 2021-01-16 NOTE — PLAN OF CARE
Goal Outcome Evaluation:  Plan of Care Reviewed With: patient     Outcome Summary: Admission from ER with Diverticulitis with small abscess. General Surgery consulted, seen by Dr. Burt, said we can advance her diet as tolerated. On Clear Liquids currently. Has denied pain. VSS. IV fluids infusing. AM labs ordered.

## 2021-01-17 ENCOUNTER — READMISSION MANAGEMENT (OUTPATIENT)
Dept: CALL CENTER | Facility: HOSPITAL | Age: 66
End: 2021-01-17

## 2021-01-17 VITALS
SYSTOLIC BLOOD PRESSURE: 152 MMHG | DIASTOLIC BLOOD PRESSURE: 74 MMHG | BODY MASS INDEX: 30.85 KG/M2 | OXYGEN SATURATION: 96 % | HEIGHT: 65 IN | HEART RATE: 90 BPM | TEMPERATURE: 97.8 F | WEIGHT: 185.19 LBS | RESPIRATION RATE: 16 BRPM

## 2021-01-17 LAB
ANION GAP SERPL CALCULATED.3IONS-SCNC: 6.6 MMOL/L (ref 5–15)
BUN SERPL-MCNC: 9 MG/DL (ref 8–23)
BUN/CREAT SERPL: 9.6 (ref 7–25)
CALCIUM SPEC-SCNC: 8.7 MG/DL (ref 8.6–10.5)
CHLORIDE SERPL-SCNC: 105 MMOL/L (ref 98–107)
CO2 SERPL-SCNC: 26.4 MMOL/L (ref 22–29)
CREAT SERPL-MCNC: 0.94 MG/DL (ref 0.57–1)
DEPRECATED RDW RBC AUTO: 41.3 FL (ref 37–54)
ERYTHROCYTE [DISTWIDTH] IN BLOOD BY AUTOMATED COUNT: 13.6 % (ref 12.3–15.4)
GFR SERPL CREATININE-BSD FRML MDRD: 59 ML/MIN/1.73
GLUCOSE SERPL-MCNC: 92 MG/DL (ref 65–99)
HCT VFR BLD AUTO: 34.2 % (ref 34–46.6)
HGB BLD-MCNC: 11.4 G/DL (ref 12–15.9)
MCH RBC QN AUTO: 27.7 PG (ref 26.6–33)
MCHC RBC AUTO-ENTMCNC: 33.3 G/DL (ref 31.5–35.7)
MCV RBC AUTO: 83.2 FL (ref 79–97)
PLATELET # BLD AUTO: 208 10*3/MM3 (ref 140–450)
PMV BLD AUTO: 10.5 FL (ref 6–12)
POTASSIUM SERPL-SCNC: 4.1 MMOL/L (ref 3.5–5.2)
RBC # BLD AUTO: 4.11 10*6/MM3 (ref 3.77–5.28)
SODIUM SERPL-SCNC: 138 MMOL/L (ref 136–145)
WBC # BLD AUTO: 5.84 10*3/MM3 (ref 3.4–10.8)

## 2021-01-17 PROCEDURE — 99213 OFFICE O/P EST LOW 20 MIN: CPT | Performed by: SURGERY

## 2021-01-17 PROCEDURE — 85027 COMPLETE CBC AUTOMATED: CPT | Performed by: HOSPITALIST

## 2021-01-17 PROCEDURE — G0378 HOSPITAL OBSERVATION PER HR: HCPCS

## 2021-01-17 PROCEDURE — 96361 HYDRATE IV INFUSION ADD-ON: CPT

## 2021-01-17 PROCEDURE — 80048 BASIC METABOLIC PNL TOTAL CA: CPT | Performed by: HOSPITALIST

## 2021-01-17 RX ORDER — ACETAMINOPHEN 325 MG/1
650 TABLET ORAL EVERY 6 HOURS PRN
Qty: 30 TABLET | Refills: 0 | Status: SHIPPED | OUTPATIENT
Start: 2021-01-17

## 2021-01-17 RX ORDER — SACCHAROMYCES BOULARDII 250 MG
250 CAPSULE ORAL DAILY
Qty: 30 CAPSULE | Refills: 0 | OUTPATIENT
Start: 2021-01-17 | End: 2021-06-18

## 2021-01-17 RX ORDER — AMOXICILLIN AND CLAVULANATE POTASSIUM 875; 125 MG/1; MG/1
1 TABLET, FILM COATED ORAL EVERY 12 HOURS SCHEDULED
Qty: 18 TABLET | Refills: 0 | Status: SHIPPED | OUTPATIENT
Start: 2021-01-17 | End: 2021-01-26

## 2021-01-17 RX ORDER — PSYLLIUM SEED (WITH DEXTROSE)
POWDER (GRAM) ORAL
Qty: 60 WAFER | Refills: 0 | Status: SHIPPED | OUTPATIENT
Start: 2021-01-17 | End: 2021-10-25

## 2021-01-17 RX ORDER — AMOXICILLIN 250 MG
1 CAPSULE ORAL 2 TIMES DAILY
Qty: 60 TABLET | Refills: 0 | Status: SHIPPED | OUTPATIENT
Start: 2021-01-17

## 2021-01-17 RX ADMIN — SODIUM CHLORIDE 100 ML/HR: 9 INJECTION, SOLUTION INTRAVENOUS at 02:42

## 2021-01-17 RX ADMIN — SODIUM CHLORIDE 100 ML/HR: 9 INJECTION, SOLUTION INTRAVENOUS at 13:42

## 2021-01-17 RX ADMIN — DOCUSATE SODIUM 50MG AND SENNOSIDES 8.6MG 1 TABLET: 8.6; 5 TABLET, FILM COATED ORAL at 08:33

## 2021-01-17 RX ADMIN — AMOXICILLIN AND CLAVULANATE POTASSIUM 1 TABLET: 875; 125 TABLET, FILM COATED ORAL at 08:34

## 2021-01-17 NOTE — DISCHARGE INSTRUCTIONS
High-Fiber Diet  Fiber, also called dietary fiber, is a type of carbohydrate found in fruits, vegetables, whole grains, and beans. A high-fiber diet can have many health benefits. Your health care provider may recommend a high-fiber diet to help:  · Prevent constipation. Fiber can make your bowel movements more regular.  · Lower your cholesterol.  · Relieve hemorrhoids, uncomplicated diverticulosis, or irritable bowel syndrome.  · Prevent overeating as part of a weight-loss plan.  · Prevent heart disease, type 2 diabetes, and certain cancers.    WHAT IS MY PLAN?  The recommended daily intake of fiber includes:  · 38 grams for men under age 50.  · 30 grams for men over age 50.  · 25 grams for women under age 50.  · 21 grams for women over age 50.  You can get the recommended daily intake of dietary fiber by eating a variety of fruits, vegetables, grains, and beans. Your health care provider may also recommend a fiber supplement if it is not possible to get enough fiber through your diet.    WHAT DO I NEED TO KNOW ABOUT A HIGH-FIBER DIET?  · Fiber supplements have not been widely studied for their effectiveness, so it is better to get fiber through food sources.  · Always check the fiber content on the nutrition facts label of any prepackaged food. Look for foods that contain at least 5 grams of fiber per serving.  · Ask your dietitian if you have questions about specific foods that are related to your condition, especially if those foods are not listed in the following section.  · Increase your daily fiber consumption gradually. Increasing your intake of dietary fiber too quickly may cause bloating, cramping, or gas.  · Drink plenty of water. Water helps you to digest fiber.    WHAT FOODS CAN I EAT?  Grains  Whole-grain breads. Multigrain cereal. Oats and oatmeal. Brown rice. Barley. Bulgur wheat. Millet. Bran muffins. Popcorn. Rye wafer crackers.  Vegetables  Sweet potatoes. Spinach. Kale. Artichokes. Cabbage.  Broccoli. Green peas. Carrots. Squash.  Fruits  Berries. Pears. Apples. Oranges. Avocados. Prunes and raisins. Dried figs.  Meats and Other Protein Sources  Navy, kidney, haro, and soy beans. Split peas. Lentils. Nuts and seeds.  Dairy  Fiber-fortified yogurt.  Beverages  Fiber-fortified soy milk. Fiber-fortified orange juice.  Other  Fiber bars.  The items listed above may not be a complete list of recommended foods or beverages. Contact your dietitian for more options.  WHAT FOODS ARE NOT RECOMMENDED?  Grains  White bread. Pasta made with refined flour. White rice.  Vegetables  Fried potatoes. Canned vegetables. Well-cooked vegetables.   Fruits  Fruit juice. Cooked, strained fruit.  Meats and Other Protein Sources  Fatty cuts of meat. Fried poultry or fried fish.  Dairy  Milk. Yogurt. Cream cheese. Sour cream.  Beverages  Soft drinks.  Other  Cakes and pastries. Butter and oils.  The items listed above may not be a complete list of foods and beverages to avoid. Contact your dietitian for more information.    WHAT ARE SOME TIPS FOR INCLUDING HIGH-FIBER FOODS IN MY DIET?  · Eat a wide variety of high-fiber foods.  · Make sure that half of all grains consumed each day are whole grains.  · Replace breads and cereals made from refined flour or white flour with whole-grain breads and cereals.  · Replace white rice with brown rice, bulgur wheat, or millet.  · Start the day with a breakfast that is high in fiber, such as a cereal that contains at least 5 grams of fiber per serving.  · Use beans in place of meat in soups, salads, or pasta.  · Eat high-fiber snacks, such as berries, raw vegetables, nuts, or popcorn.    A high fiber diet with plenty of fluids (up to 8 glasses of water daily) is suggested to relieve these symptoms.  Metamucil, 1 tablespoon once or twice daily can be used to keep bowels regular if needed.

## 2021-01-17 NOTE — PLAN OF CARE
Goal Outcome Evaluation:  Plan of Care Reviewed With: patient  Progress: improving  Outcome Summary: ivf, refuses pain med, gi soft diet for bkfst, vdgLBM 01/14

## 2021-01-17 NOTE — PLAN OF CARE
Goal Outcome Evaluation:  Plan of Care Reviewed With: patient  Progress: improving  Outcome Summary: Up ad laurel. Tolerated diet today with no nausea or vomiting. Voiding. Discharge home today.

## 2021-01-17 NOTE — DISCHARGE SUMMARY
Patient Name: Chastity Berrios  : 1955  MRN: [id number]    Date of Admission: 2021  Date of Discharge:  2021  Primary Care Physician: Alvaro Small MD      Chief Complaint:   Abdominal Pain      Discharge Diagnoses     Active Hospital Problems    Diagnosis  POA   • **Diverticulitis of large intestine with abscess without bleeding [K57.20]  Yes   • Coronary artery disease [I25.10]  Yes   • S/P AVR (aortic valve replacement) [Z95.2]  Not Applicable      Resolved Hospital Problems   No resolved problems to display.        Hospital Course     Ms. Berrios is a 65 y.o. female with a history of CAD and tissue AVR who presented to [facility] initially complaining of no pain for the past 7 days prior to admission worsening 2 days prior to ER presentation on 2021.  She also reported constipation that started about a week ago prior to admission.  Please see the admitting history and physical for further details.  She was found to have sigmoid diverticulitis of large intestine with an abscess without bleeding.  She was started on Levaquin and Flagyl in the emergency department.  There was no evidence of sepsis or peritonitis but given the presence of the abscess general surgery was consulted.  Dr. Burt evaluated the patient and felt her acute sigmoid diverticulitis with small abscess formation versus ovarian cyst could be managed conservatively with 15 days of antibiotics, high-fiber diet, Metamucil, and stool softeners as needed.  She was kept overnight for observation and was placed on IV fluids.  Cultures were obtained but were pending at the time of discharge.  Covid screen was negative.       Dr. Burt advance diet the morning he saw her on 2021 felt that she could tolerate advancing diet she could be discharged from his standpoint on antibiotic therapy with follow-up as an outpatient.  I saw her just prior to 1 PM  And she had had an episode of dizziness after getting up to  the bathroom for bowel movement.  She had not eaten lunch as of yet but denied.  The felt that she had been up most of the day and perhaps had overdone it.  She denied any nominal pain on my initial exam other than the dizziness and clammy feeling after BM she actually felt much improved on the day prior.    I reevaluated her around 4 PM.  She was improved.  She has been able to tolerate advancement in diet and was able to eat a turkey sandwich with some applesauce without any nausea vomiting or abdominal pain.  Orthostatic blood pressures were obtained and were negative.  She was quite anxious for discharge home.  Reviewed discharge medications importance of antibiotics as prescribed.  Florastor was recommended while on her Augmentin.  She was instructed to return to emergency room for worsening of symptoms, syncope, near syncope, shortness of breath, melena, worsening abdominal pain, fever, or chills. She verbalized understanding of teaching wellness follow-up recommendations.       Day of Discharge     Subjective:  Feels good.  Had a little dizziness after having BM earlier this am but she has been up and this as resolved.  She has been ambulating without any shoa, dizziness, chest pain, abd. Pain, or nausea.  Tolerated breakfast and lunch without issues.      Review of Systems   Constitutional: Negative for chills and fever.   Respiratory: Negative for chest tightness and shortness of breath.    Cardiovascular: Negative for chest pain.   Gastrointestinal: Negative for abdominal distention, abdominal pain, blood in stool, constipation, nausea and vomiting.   Musculoskeletal: Negative for gait problem.   Skin: Negative for rash.   Neurological: Negative for syncope, weakness and light-headedness.          Physical Exam:  Temp:  [96.3 °F (35.7 °C)-98.2 °F (36.8 °C)] 97.8 °F (36.6 °C)  Heart Rate:  [69-90] 90  Resp:  [16] 16  BP: ()/(58-79) 152/74  Body mass index is 30.82 kg/m².     Physical Exam  Vitals  signs and nursing note reviewed.   Constitutional:       General: She is not in acute distress.     Appearance: Normal appearance. She is obese. She is not ill-appearing, toxic-appearing or diaphoretic.   HENT:      Head: Normocephalic and atraumatic.   Eyes:      General: No scleral icterus.     Conjunctiva/sclera: Conjunctivae normal.   Cardiovascular:      Rate and Rhythm: Normal rate and regular rhythm.      Pulses: Normal pulses.      Heart sounds: Normal heart sounds.   Pulmonary:      Effort: Pulmonary effort is normal.      Breath sounds: Normal breath sounds.   Abdominal:      General: Abdomen is flat. Bowel sounds are normal. There is no distension.      Palpations: Abdomen is soft.      Tenderness: There is no abdominal tenderness.   Musculoskeletal: Normal range of motion.      Right lower leg: No edema.      Left lower leg: No edema.   Skin:     General: Skin is warm and dry.   Neurological:      General: No focal deficit present.      Mental Status: She is alert and oriented to person, place, and time. Mental status is at baseline.   Psychiatric:         Mood and Affect: Mood normal.         Behavior: Behavior normal.         Thought Content: Thought content normal.         Judgment: Judgment normal.            Consultants     Consult Orders (all) (From admission, onward)     Start     Ordered    01/16/21 1502  Inpatient General Surgery Consult  Once     Specialty:  General Surgery  Provider:  Emery Blanco MD    01/16/21 1501    01/16/21 1339  [facility] (on-call MD unless specified) Details  Once     Specialty:  Hospitalist  Provider:  (Not yet assigned)    01/16/21 1338              Procedures     Imaging Results (All)     Procedure Component Value Units Date/Time    CT Abdomen Pelvis With Contrast [[id number]] Collected: 01/16/21 1258     Updated: 01/16/21 1309    Narrative:      CT ABDOMEN PELVIS W CONTRAST-     INDICATIONS: Left lower quadrant pain     TECHNIQUE: Radiation dose reduction  techniques were utilized, including  automated exposure control and exposure modulation based on body size.  Enhanced ABDOMEN AND PELVIS CT     COMPARISON: Abdomen CT from 07/06/2004     FINDINGS:     Mild nonspecific chronic nodular thickening of the adrenal glands.     Relative low-density of the liver suggests steatosis.           Otherwise unremarkable appearance of the liver, spleen, adrenal glands,  pancreas, kidneys, bladder.     No bowel obstruction. Colonic diverticula are seen, with proximal  sigmoid acute diverticulitis. Wall thickening at this level, 1.2 cm,  presumably reactive in nature, follow-up evaluation with direct  visualization recommended to exclude any possibility of underlying  lesion. A small abscess is apparent along the posterior margin of  proximal sigmoid colon at this level, 1.0 x 1.2 x 1.3 cm, for example  axial image 101.     No free intraperitoneal gas or free fluid.     Scattered small mesenteric and para-aortic lymph nodes are seen that are  not significant by size criteria.     Abdominal aorta is not aneurysmal. Aortic and other arterial  calcifications are present.     The lung bases are clear.     Degenerative changes are seen in the spine. No acute fracture is  identified.             Impression:         Proximal sigmoid diverticulitis with wall thickening and small abscess  at the posterior margin of the colon wall.     Discussed by telephone with Dr. Hayes at 1302, 01/16/2021     This report was finalized on 1/16/2021 1:06 PM by Dr. Cong Evans M.D.             Pertinent Labs     Results from last 7 days   Lab Units 01/17/21  0650 01/16/21  1057   WBC 10*3/mm3 5.84 10.54   HEMOGLOBIN g/dL 11.4* 12.8   PLATELETS 10*3/mm3 208 249     Results from last 7 days   Lab Units 01/17/21  0650 01/16/21  1057   SODIUM mmol/L 138 139   POTASSIUM mmol/L 4.1 4.2   CHLORIDE mmol/L 105 102   CO2 mmol/L 26.4 25.8   BUN mg/dL 9 11   CREATININE mg/dL 0.94 1.10*   GLUCOSE mg/dL 92 107*    Estimated Creatinine Clearance: 62.2 mL/min (by C-G formula based on SCr of 0.94 mg/dL).  Results from last 7 days   Lab Units 01/16/21  1057   ALBUMIN g/dL 4.20   BILIRUBIN mg/dL 0.4   ALK PHOS U/L 85   AST (SGOT) U/L 23   ALT (SGPT) U/L 25     Results from last 7 days   Lab Units 01/17/21  0650 01/16/21  1057   CALCIUM mg/dL 8.7 9.3   ALBUMIN g/dL  --  4.20     Results from last 7 days   Lab Units 01/16/21  1057   LIPASE U/L 48             Invalid input(s): LDLCALC  Results from last 7 days   Lab Units 01/16/21  1404   BLOODCX  No growth at 24 hours  No growth at 24 hours       Test Results Pending at Discharge     Pending Labs     Order Current Status    Blood Culture - Blood, Arm, Left Preliminary result    Blood Culture - Blood, Arm, Right Preliminary result          Discharge Details        Discharge Medications      New Medications      Instructions Start Date   acetaminophen 325 MG tablet  Commonly known as: TYLENOL   650 mg, Oral, Every 6 Hours PRN      amoxicillin-clavulanate 875-125 MG per tablet  Commonly known as: AUGMENTIN   1 tablet, Oral, Every 12 Hours Scheduled      Metamucil wafer wafer   Start taking one wafer a day for next week then increase to two wafers a day.      saccharomyces boulardii 250 MG capsule  Commonly known as: Florastor   250 mg, Oral, Daily      sennosides-docusate 8.6-50 MG per tablet  Commonly known as: PERICOLACE   1 tablet, Oral, 2 Times Daily         Continue These Medications      Instructions Start Date   aspirin 81 MG EC tablet   81 mg, Oral, Daily      Claritin 10 MG tablet  Generic drug: loratadine   10 mg, Oral, Daily      fluticasone 50 MCG/ACT nasal spray  Commonly known as: FLONASE   2 sprays, Nasal, Daily PRN      metoprolol succinate XL 25 MG 24 hr tablet  Commonly known as: TOPROL-XL   25 mg, Oral, Daily      multivitamin with minerals tablet tablet   1 tablet, Oral, Daily      vitamin D 1.25 MG (91219 UT) capsule capsule  Commonly known as: ERGOCALCIFEROL    50,000 Units, Oral, Every 7 Days             No Known Allergies      Discharge Disposition:  Home or Self Care    Discharge Diet:  Diet Order   Procedures   • Diet Regular; GI Soft       Discharge Activity:   Activity Instructions     Activity as Tolerated            CODE STATUS:    Code Status and Medical Interventions:   Ordered at: 01/16/21 1450     Code Status:    CPR     Medical Interventions (Level of Support Prior to Arrest):    Full       Future Appointments   Date Time Provider Department Center   1/29/2021  2:00 PM [facility] MAMM 1  [facility] RIO E UCE   1/29/2021  2:30 PM [facility] DEXA 1  [facility] DEX E UCE   11/15/2021  1:00 PM Alvaro Small MD [facility] [id number] DION   11/17/2021  2:00 PM Jose Bonner MD [facility] LCGKR None     Follow-up Information     Ishaan Burt MD. Schedule an appointment as soon as possible for a visit in 6 week(s).    Specialty: General Surgery  Contact information:  [address]  [facility] [address]  [place] [id number]  [phone]             Alvaro Small MD Follow up in 1 week(s).    Specialty: Internal Medicine  Why: call office Monday 1/18/20 to set up appointment for 1 week follow up  Contact information:  [address] [facility] [address]  [place] [id number]  [phone]                   Time Spent on Discharge:  Greater than 30 minutes      STEPHANIE Del Rosario  [facility]  01/17/21  16:36 EST

## 2021-01-17 NOTE — PROGRESS NOTES
Cc: Diverticulitis with abscess formation    S: Patient is doing great and denies any complaint.  She has been able to tolerate diet without any nausea or vomiting, no fevers or chills.  Tolerating p.o. antibiotics.  Had 1 bowel movement    O:   Vitals:    01/17/21 0100 01/17/21 0517 01/17/21 1021 01/17/21 1215   BP: 122/67 97/58 122/70 141/73   BP Location: Left arm Left arm Left arm Left arm   Patient Position: Lying Lying Lying Lying   Pulse: 74 69 77 81   Resp: 16 16 16    Temp: 97.6 °F (36.4 °C) 97.4 °F (36.3 °C) 97.9 °F (36.6 °C) 97.8 °F (36.6 °C)   TempSrc: Oral Oral Oral Oral   SpO2: 95% 96% 97% 96%   Weight:       Height:         Alert oriented x3, no acute distress  Breathing comfortable, regular rate rhythm  Abdominal soft, slightly tender to deep palpation in the left lower quadrant, no rebound or guarding no peritoneal signs  No lower extremity edema    Labs within normal limits    Assessment and plan    65-year-old female with acute sigmoid diverticulitis with questionable small abscess formation.  She is doing great, tolerating diet and having good bowel function.  Discussed with her about further step.  I think she should be able to go home today with 10 days of p.o. antibiotics.  She is to follow-up in my office in 6 weeks and we will plan for colonoscopy.  Treatment for diverticulitis for now will be with high-fiber diet and stool softeners as needed.  No need for any type of surgical intervention.  She understood    -Okay to discharge from my standpoint  - Follow-up in my office as instructed

## 2021-01-18 ENCOUNTER — TRANSITIONAL CARE MANAGEMENT TELEPHONE ENCOUNTER (OUTPATIENT)
Dept: CALL CENTER | Facility: HOSPITAL | Age: 66
End: 2021-01-18

## 2021-01-18 NOTE — PROGRESS NOTES
Case Management Discharge Note      Final Note: Discharged home. Princess Barr, RADAMES         Transportation Services  Private: Car    Final Discharge Disposition Code: 01 - home or self-care

## 2021-01-18 NOTE — OUTREACH NOTE
Call Center TCM Note      Responses   [facility] patient discharged from?  [place]   Does the patient have one of the following disease processes/diagnoses(primary or secondary)?  Other   TCM attempt successful?  Yes   Call start time  1703   Call end time  1712   Discharge diagnosis  Diverticulitis of large intestine with abscess without bleeding   Is patient permission given to speak with other caregiver?  No   Meds reviewed with patient/caregiver?  Yes   Is the patient having any side effects they believe may be caused by any medication additions or changes?  No   Does the patient have all medications ordered at discharge?  Yes   Is the patient taking all medications as directed (includes completed medication regime)?  Yes   Does the patient have a primary care provider?   Yes   Does the patient have an appointment with their PCP within 7 days of discharge?  Yes   Comments regarding PCP  PCP Dr Alvaro Small. Hospital follow up scheduled for Tues 1/26/21  1130am    Has the patient kept scheduled appointments due by today?  N/A   Has home health visited the patient within 72 hours of discharge?  N/A   Psychosocial issues?  No   Did the patient receive a copy of their discharge instructions?  Yes   Nursing interventions  Reviewed instructions with patient   What is the patient's perception of their health status since discharge?  Improving [Patient denies any abdominal pain today. She reports a headache today,  took one regular tylenol. ]   Is the patient/caregiver able to teach back signs and symptoms related to disease process for when to call PCP?  Yes   Is the patient/caregiver able to teach back signs and symptoms related to disease process for when to call 911?  Yes   Is the patient/caregiver able to teach back the hierarchy of who to call/visit for symptoms/problems? PCP, Specialist, Home health nurse, Urgent Care, ED, 911  Yes   If the patient is a current smoker, are they able to teach back  resources for cessation?  Not a smoker   TCM call completed?  Yes          Lorena Valerio RN    1/18/2021, 17:12 EST

## 2021-01-18 NOTE — OUTREACH NOTE
Prep Survey      Responses   [facility] patient discharged from?  [place]   Is LACE score < 7 ?  Yes   Emergency Room discharge w/ pulse ox?  No   Eligibility  [facility]   Date of Admission  01/16/21   Date of Discharge  01/17/21   Discharge Disposition  Home or Self Care   Discharge diagnosis  Diverticulitis of large intestine with abscess without bleeding   Does the patient have one of the following disease processes/diagnoses(primary or secondary)?  Other   Does the patient have Home health ordered?  No   Is there a DME ordered?  No   Prep survey completed?  Yes          Lana Toussaint RN

## 2021-01-21 LAB
BACTERIA SPEC AEROBE CULT: NORMAL
BACTERIA SPEC AEROBE CULT: NORMAL

## 2021-01-26 ENCOUNTER — OFFICE VISIT (OUTPATIENT)
Dept: INTERNAL MEDICINE | Facility: CLINIC | Age: 66
End: 2021-01-26

## 2021-01-26 VITALS
DIASTOLIC BLOOD PRESSURE: 80 MMHG | HEART RATE: 87 BPM | WEIGHT: 188 LBS | HEIGHT: 65 IN | OXYGEN SATURATION: 99 % | BODY MASS INDEX: 31.32 KG/M2 | RESPIRATION RATE: 16 BRPM | TEMPERATURE: 96.8 F | SYSTOLIC BLOOD PRESSURE: 140 MMHG

## 2021-01-26 DIAGNOSIS — K57.20 DIVERTICULITIS OF LARGE INTESTINE WITH ABSCESS WITHOUT BLEEDING: Primary | ICD-10-CM

## 2021-01-26 PROCEDURE — 99495 TRANSJ CARE MGMT MOD F2F 14D: CPT | Performed by: INTERNAL MEDICINE

## 2021-01-26 PROCEDURE — 1111F DSCHRG MED/CURRENT MED MERGE: CPT | Performed by: INTERNAL MEDICINE

## 2021-01-26 NOTE — PROGRESS NOTES
Transitional Care Follow Up Visit  Subjective     Chastity Berrios is a 65 y.o. female who presents for a transitional care management visit.    Within 48 business hours after discharge our office contacted her via telephone to coordinate her care and needs.      I reviewed and discussed the details of that call along with the discharge summary, hospital problems, inpatient lab results, inpatient diagnostic studies, and consultation reports with Chastity.     Current outpatient and discharge medications have been reconciled for the patient.  Reviewed by: Jazmine Pedersen MA      Date of TCM Phone Call 1/17/2021   [facility]   Date of Admission 1/16/2021   Date of Discharge 1/17/2021   Discharge Disposition Home or Self Care     Risk for Readmission (LACE) Score: 2 (1/17/2021  6:01 AM)      History of Present Illness   Course During Hospital Stay: Ms. Berrios is a 65 y.o. female with a history of CAD and tissue AVR who presented to [facility] with symptoms consistent with acute diverticulitis.  She had a small abscess on CT scan. She was started on Levaquin and Flagyl in the emergency department.    She was then switched to a combination of Rocephin and Flagyl.  She was discharged 9 days ago to continue on Augmentin 875 mg mg twice daily for a total of 15 days of antibiotics.  Dr. Burt evaluated the patient.  A high-fiber diet, Metamucil, and stool softeners as needed were recommended.       She had a spell of lightheadedness just prior to discharge that then resolved after few hours.  [place] to be orthostasis.  Florastor was recommended while on her Augmentin.    The antibiotics were completed today.  She has done well.  She is now completely asymptomatic.     The following portions of the patient's history were reviewed and updated as appropriate: allergies, current medications, past medical history and problem list.    Review of Systems    Objective   Physical  Exam  Constitutional:       Appearance: Normal appearance.   Abdominal:      General: Abdomen is flat. Bowel sounds are normal. There is no distension.      Palpations: Abdomen is soft.      Tenderness: There is no abdominal tenderness. There is no guarding.   Neurological:      Mental Status: She is alert.         Assessment/Plan   Diagnoses and all orders for this visit:    1. Diverticulitis of large intestine with abscess without bleeding (Primary)    In for follow-up of acute diverticulitis.  She has been home for 9 days now.  Symptoms are pretty much resolved at this point.  Exam is unremarkable.  We discussed taking Metamucil on a daily basis going forward.  Discussed avoiding foods that bother her but otherwise there is no specific food introductions.

## 2021-01-29 ENCOUNTER — HOSPITAL ENCOUNTER (OUTPATIENT)
Dept: BONE DENSITY | Facility: HOSPITAL | Age: 66
Discharge: HOME | End: 2021-01-29

## 2021-01-29 ENCOUNTER — HOSPITAL ENCOUNTER (OUTPATIENT)
Dept: MAMMOGRAPHY | Facility: HOSPITAL | Age: 66
Discharge: HOME | End: 2021-01-29

## 2021-01-29 DIAGNOSIS — M88.9 PAGET'S BONE DISEASE: ICD-10-CM

## 2021-01-29 DIAGNOSIS — R93.7 ABNORMAL X-RAY OF BONE: ICD-10-CM

## 2021-01-29 DIAGNOSIS — Z12.39 SCREENING BREAST EXAMINATION: ICD-10-CM

## 2021-01-29 PROCEDURE — 77080 DXA BONE DENSITY AXIAL: CPT

## 2021-01-29 PROCEDURE — 77063 BREAST TOMOSYNTHESIS BI: CPT

## 2021-01-29 PROCEDURE — 77067 SCR MAMMO BI INCL CAD: CPT

## 2021-02-02 ENCOUNTER — TELEMEDICINE (OUTPATIENT)
Dept: INTERNAL MEDICINE | Facility: CLINIC | Age: 66
End: 2021-02-02

## 2021-02-02 DIAGNOSIS — Z20.822 CLOSE EXPOSURE TO COVID-19 VIRUS: Primary | ICD-10-CM

## 2021-02-02 PROCEDURE — 99213 OFFICE O/P EST LOW 20 MIN: CPT | Performed by: INTERNAL MEDICINE

## 2021-02-02 NOTE — PROGRESS NOTES
Subjective   Chastity Berrios is a 65 y.o. female.     Chief Complaint   Patient presents with   • Exposure To Known Illness         Video visit today due to Covid pandemic.  Last Friday 5 days ago he was exposed to a son-in-law who got sick the next day and developed Covid positivity.  Patient is completely asymptomatic.  No fever.  No cough.  No shortness of breath.  No loss of smell or taste.  No headache.  No diarrhea.       The following portions of the patient's history were reviewed and updated as appropriate: allergies, current medications, past social history and problem list.    Outpatient Medications Marked as Taking for the 2/2/21 encounter (Telemedicine) with Alvaro Small MD   Medication Sig Dispense Refill   • acetaminophen (TYLENOL) 325 MG tablet Take 2 tablets by mouth Every 6 (Six) Hours As Needed for Mild Pain . 30 tablet 0   • aspirin 81 MG EC tablet Take 1 tablet by mouth Daily. 60 tablet 1   • fluticasone (FLONASE) 50 MCG/ACT nasal spray 2 sprays into each nostril Daily As Needed.     • loratadine (CLARITIN) 10 MG tablet Take 10 mg by mouth Daily.     • metoprolol succinate XL (TOPROL-XL) 25 MG 24 hr tablet Take 1 tablet by mouth Daily. 90 tablet 3   • Multiple Vitamins-Minerals (MULTIVITAMIN ADULTS PO) Take 1 tablet by mouth Daily.     • Psyllium (Metamucil) wafer wafer Start taking one wafer a day for next week then increase to two wafers a day. 60 wafer 0   • saccharomyces boulardii (Florastor) 250 MG capsule Take 1 capsule by mouth Daily. 30 capsule 0   • sennosides-docusate (senna-docusate sodium) 8.6-50 MG per tablet Take 1 tablet by mouth 2 (Two) Times a Day. 60 tablet 0   • vitamin D (ERGOCALCIFEROL) 1.25 MG (79056 UT) capsule capsule Take 1 capsule by mouth Every 7 (Seven) Days. 12 capsule 3       Review of Systems   Constitutional: Negative for chills and fever.   Respiratory: Negative for cough and shortness of breath.    Gastrointestinal: Negative for diarrhea.   Neurological:  Negative for headaches.       Objective   There were no vitals filed for this visit.   Wt Readings from Last 3 Encounters:   01/26/21 85.3 kg (188 lb)   01/16/21 84 kg (185 lb 3 oz)   01/16/21 83.9 kg (185 lb)    There is no height or weight on file to calculate BMI.      Physical Exam  Constitutional:       Appearance: Normal appearance.   Pulmonary:      Effort: Pulmonary effort is normal.   Neurological:      Mental Status: She is alert.           Problems Addressed this Visit     None      Visit Diagnoses     Close exposure to COVID-19 virus    -  Primary      Diagnoses       Codes Comments    Close exposure to COVID-19 virus    -  Primary ICD-10-CM: Z20.822  ICD-9-CM: V01.79         Assessment/Plan   Video visit today due to Covid pandemic.  Last Friday 5 days ago he was exposed to a son-in-law who got sick the next day and developed Covid positivity.  Patient is completely asymptomatic.  No fever.  No cough.  No shortness of breath.  No loss of smell or taste.  No headache.  No diarrhea.  We discussed getting an O2 monitor to monitor at home.  Otherwise he knows he needs to quarantine for 7 days.  No real reason to test at this point.  We will simply observe.  If symptoms develop he will call back and we will set for a Covid antigen PCR test.  Spent 18 minutes with patient on the visit today.             Dragon disclaimer:   Much of this encounter note is an electronic transcription/translation of spoken language to printed text. The electronic translation of spoken language may permit erroneous, or at times, nonsensical words or phrases to be inadvertently transcribed; Although I have reviewed the note for such errors, some may still exist.

## 2021-03-09 ENCOUNTER — IMMUNIZATION (OUTPATIENT)
Dept: VACCINE CLINIC | Facility: HOSPITAL | Age: 66
End: 2021-03-09

## 2021-03-09 PROCEDURE — 0001A: CPT | Performed by: INTERNAL MEDICINE

## 2021-03-09 PROCEDURE — 91300 HC SARSCOV02 VAC 30MCG/0.3ML IM: CPT | Performed by: INTERNAL MEDICINE

## 2021-03-30 ENCOUNTER — IMMUNIZATION (OUTPATIENT)
Dept: VACCINE CLINIC | Facility: HOSPITAL | Age: 66
End: 2021-03-30

## 2021-03-30 PROCEDURE — 0002A: CPT | Performed by: INTERNAL MEDICINE

## 2021-03-30 PROCEDURE — 91300 HC SARSCOV02 VAC 30MCG/0.3ML IM: CPT | Performed by: INTERNAL MEDICINE

## 2021-05-10 RX ORDER — ERGOCALCIFEROL 1.25 MG/1
50000 CAPSULE ORAL
Qty: 12 CAPSULE | Refills: 3 | Status: SHIPPED | OUTPATIENT
Start: 2021-05-10 | End: 2021-08-09 | Stop reason: ALTCHOICE

## 2021-06-18 ENCOUNTER — TELEPHONE (OUTPATIENT)
Dept: ORTHOPEDIC SURGERY | Facility: CLINIC | Age: 66
End: 2021-06-18

## 2021-06-18 ENCOUNTER — TELEPHONE (OUTPATIENT)
Dept: INTERNAL MEDICINE | Facility: CLINIC | Age: 66
End: 2021-06-18

## 2021-06-18 NOTE — TELEPHONE ENCOUNTER
Caller: Chastity Berrios    Relationship: Self    Best call back number: [phone]    What is the best time to reach you: ANY    Who are you requesting to speak with (clinical staff, provider,  specific staff member): CLINICAL STAFF    Do you require a callback: YES PATIENT HAS A QUARTER SIZE KNOT ON THE INSIDE OF HER RIGHT ANKLE, SHOULD SHE BE SEEN?  SHE BELIEVES THAT THIS MIGHT BE A BUG BITE?  DOES SHE NEED TO GO TO THE URGENT CARE?

## 2021-06-18 NOTE — TELEPHONE ENCOUNTER
Della has another patient Chastity Berrios who went to [facility] [place] by [facility] and has a ganglion cyst right foot and you have no openings till July 29th/do you want to work in or have her see Nataliia

## 2021-06-21 ENCOUNTER — OFFICE VISIT (OUTPATIENT)
Dept: ORTHOPEDIC SURGERY | Facility: CLINIC | Age: 66
End: 2021-06-21

## 2021-06-21 VITALS — BODY MASS INDEX: 29.99 KG/M2 | WEIGHT: 180 LBS | HEIGHT: 65 IN | TEMPERATURE: 96.8 F

## 2021-06-21 DIAGNOSIS — M25.571 RIGHT ANKLE PAIN, UNSPECIFIED CHRONICITY: Primary | ICD-10-CM

## 2021-06-21 DIAGNOSIS — R22.41 ANKLE MASS, RIGHT: ICD-10-CM

## 2021-06-21 PROCEDURE — 73610 X-RAY EXAM OF ANKLE: CPT | Performed by: NURSE PRACTITIONER

## 2021-06-21 PROCEDURE — 99203 OFFICE O/P NEW LOW 30 MIN: CPT | Performed by: NURSE PRACTITIONER

## 2021-06-21 RX ORDER — CALCIPOTRIENE 50 UG/G
1 OINTMENT TOPICAL 2 TIMES DAILY PRN
COMMUNITY
Start: 2021-06-11

## 2021-06-21 NOTE — PROGRESS NOTES
"Patient Name: Chastity Berrios   YOB: 1955  Referring Primary Care Physician: Alvaro Small MD  BMI: Body mass index is 29.95 kg/m².    Chief Complaint:    Chief Complaint   Patient presents with   • Right Ankle - Pain        HPI: New patient to me presents with right lateral malleolus mass.  She was seen by her primary care physician and referred here.  Patient reports that she was kneeling down to  toys from her great-grandchildren in \"whacked her ankle on the ground and developed swelling.\"  Patient has no history of trauma or injury prior PCP felt it was a ganglion cyst is continued to increase in size.    Chastity Berrios is a 65 y.o. female who presents today for evaluation of   Chief Complaint   Patient presents with   • Right Ankle - Pain         Subjective   Medications:   Home Medications:  Current Outpatient Medications on File Prior to Visit   Medication Sig   • aspirin 81 MG EC tablet Take 1 tablet by mouth Daily.   • calcipotriene (DOVONOX) 0.005 % ointment    • FIBER ADULT GUMMIES PO Take  by mouth.   • fluticasone (FLONASE) 50 MCG/ACT nasal spray 2 sprays into each nostril Daily As Needed.   • loratadine (CLARITIN) 10 MG tablet Take 10 mg by mouth Daily.   • metoprolol succinate XL (TOPROL-XL) 25 MG 24 hr tablet Take 1 tablet by mouth Daily.   • Multiple Vitamins-Minerals (MULTIVITAMIN ADULTS PO) Take 1 tablet by mouth Daily.   • vitamin D (ERGOCALCIFEROL) 1.25 MG (53013 UT) capsule capsule Take 1 capsule by mouth Every 7 (Seven) Days.   • acetaminophen (TYLENOL) 325 MG tablet Take 2 tablets by mouth Every 6 (Six) Hours As Needed for Mild Pain .   • Psyllium (Metamucil) wafer wafer Start taking one wafer a day for next week then increase to two wafers a day.   • sennosides-docusate (senna-docusate sodium) 8.6-50 MG per tablet Take 1 tablet by mouth 2 (Two) Times a Day.     Current Facility-Administered Medications on File Prior to Visit   Medication   • Chlorhexidine Gluconate " Cloth 2 % pads 1 application     Current Medications:  Scheduled Meds:  Continuous Infusions:No current facility-administered medications for this visit.    PRN Meds:.    I have reviewed the patient's medical history in detail and updated the computerized patient record.  Review and summarization of old records includes:    Past Medical History:   Diagnosis Date   • Angina pectoris (CMS/HCC)    • Aortic regurgitation     trace to mild   • Aortic systolic murmur on examination    • Aortic valvar stenosis    • Arrhythmia    • Arthralgia     both thumbs are getting stiff   • Chest pain    • Coronary artery disease    • Environmental allergies    • First degree AV block    • Heart palpitations    • Hyperlipidemia    • IGT (impaired glucose tolerance)    • Left carotid stenosis 3/9/2018   • Migraines    • Mild concentric left ventricular hypertrophy (LVH)    • Mitral regurgitation     trivial to mild   • Psoriasis     ELBOWS CALOS AND LEGS CALOS   • PSVT (paroxysmal supraventricular tachycardia) (CMS/HCC)    • Pulmonic regurgitation     trace   • Snoring    • SOB (shortness of breath)    • Spinal headache     AFTER CHILDBIRTH   • SVT (supraventricular tachycardia) (CMS/HCC)    • Wheezing     NOTHING CURRENT        Past Surgical History:   Procedure Laterality Date   • AORTIC VALVE REPAIR/REPLACEMENT N/A 2/27/2018    Procedure: EMANI MINI STERNOTOMY AORTIC VALVE REPLACEMENT PRP;  Surgeon: Jean Claude Jones MD;  Location: [facility] OR;  Service:    • CARDIAC ABLATION  02/14/2014   • CARDIAC CATHETERIZATION  01/06/2014   • CARDIAC CATHETERIZATION N/A 1/24/2018    Procedure: Right and Left Heart Cath;  Surgeon: Fabián Morales MD;  Location: [facility] INVASIVE LOCATION;  Service:    • CARDIAC CATHETERIZATION N/A 1/24/2018    Procedure: Coronary angiography;  Surgeon: Fabián Morales MD;  Location: [facility] INVASIVE LOCATION;  Service:    • CARDIAC CATHETERIZATION N/A 1/24/2018    Procedure: Left  ventriculography;  Surgeon: Fabián Morales MD;  Location: [facility] INVASIVE LOCATION;  Service:    • CATARACT EXTRACTION     • TONSILLECTOMY          Social History     Occupational History   • Occupation: housewife   Tobacco Use   • Smoking status: Never Smoker   • Smokeless tobacco: Never Used   Substance and Sexual Activity   • Alcohol use: Yes     Alcohol/week: 1.0 standard drinks     Types: 1 Glasses of wine per week     Comment: social / caffeine use   • Drug use: No   • Sexual activity: Defer      Social History     Social History Narrative   • Not on file        Family History   Problem Relation Age of Onset   • Hyperlipidemia Mother    • Stroke Mother    • Hypertension Father    • Diabetes Father    • Pancreatic cancer Father    • Breast cancer Maternal Grandmother    • Diabetes Maternal Grandfather    • Lung cancer Maternal Grandfather    • Malig Hyperthermia Neg Hx        ROS: 14 point review of systems was performed and all other systems were reviewed and are negative except for documented findings in HPI and today's encounter.     Allergies: No Known Allergies  Constitutional:  Denies fever, shaking or chills   Eyes:  Denies change in visual acuity   HENT:  Denies nasal congestion or sore throat   Respiratory:  Denies cough or shortness of breath   Cardiovascular:  Denies chest pain or severe LE edema   GI:  Denies abdominal pain, nausea, vomiting, bloody stools or diarrhea   Musculoskeletal:  Numbness, tingling, pain, or loss of motor function only as noted above in history of present illness.  : Denies painful urination or hematuria  Integument:  Denies rash, lesion or ulceration   Neurologic:  Denies headache or focal weakness  Endocrine:  Denies lymphadenopathy  Psych:  Denies confusion or change in mental status   Hem:  Denies active bleeding    OBJECTIVE:  Physical Exam: 65 y.o. female  Wt Readings from Last 3 Encounters:   06/21/21 81.6 kg (180 lb)   06/18/21 81.6 kg (180 lb)  "  01/26/21 85.3 kg (188 lb)     Ht Readings from Last 1 Encounters:   06/21/21 165.1 cm (65\")     Body mass index is 29.95 kg/m².  Vitals:    06/21/21 0930   Temp: 96.8 °F (36 °C)     Vital signs reviewed.     General Appearance:    Alert, cooperative, in no acute distress                  Eyes: conjunctiva clear  ENT: external ears and nose atraumatic  CV: no peripheral edema  Resp: normal respiratory effort  Skin: no rashes or wounds; normal turgor  Psych: mood and affect appropriate  Lymph: no nodes appreciated  Neuro: gross sensation intact  Vascular:  Palpable peripheral pulse in noted extremity  Musculoskeletal Extremities: Skin is warm dry intact with good pulses movement and sensation calf is nontender to palpation right ankle lateral malleolus has a solid protuberance 1 x 1 cm there is no break in the skin there is no fluctuance no pain with dorsiflexion or flexion Apley refill is intact compartments are soft    Radiology: 3 views of the right ankle were done and reviewed no comparison films done for mass patient has no bony prominence or fracture soft tissue protuberance over the lateral malleolus      Assessment:     ICD-10-CM ICD-9-CM   1. Right ankle pain, unspecified chronicity  M25.571 719.47   2. Ankle mass, right  R22.41 719.67     Discussed with patient we need to pad this area so she does not hit it I used Kerlix and web roll to pad the lateral malleolus with an Ace wrap and will obtain an MRI to further assess and send her to Dr. Morrison for follow-up patient verbalized understanding  MDM/Plan:   The diagnosis(es), natural history, pathophysiology and treatment for diagnosis(es) were discussed. Opportunity given and questions answered.  Biomechanics of pertinent body areas discussed.  When appropriate, the use of ambulatory aids discussed.    The diagnosis(es), natural history, pathophysiology and treatment for diagnosis(es) were discussed. Opportunity given and questions " answered.  Biomechanics of pertinent body areas discussed.  When appropriate, the use of ambulatory aids discussed.  MEDICATIONS:  The risks, benefits, warnings,side effects and alternatives of medications discussed.  Inflammation/pain control; with cold, heat, elevation and/or liniments discussed as appropriate  MRI.  MEDICAL RECORDS reviewed from other provider(s) for past and current medical history pertinent to this complaint.      6/21/2021    Much of this encounter note is an electronic transcription/translation of spoken language to printed text. The electronic translation of spoken language may permit erroneous, or at times, nonsensical words or phrases to be inadvertently transcribed; Although I have reviewed the note for such errors, some may still exist

## 2021-06-28 ENCOUNTER — HOSPITAL ENCOUNTER (OUTPATIENT)
Dept: MRI IMAGING | Facility: HOSPITAL | Age: 66
Discharge: HOME | End: 2021-06-28
Admitting: NURSE PRACTITIONER

## 2021-06-28 DIAGNOSIS — M25.571 RIGHT ANKLE PAIN, UNSPECIFIED CHRONICITY: ICD-10-CM

## 2021-06-28 DIAGNOSIS — R22.41 ANKLE MASS, RIGHT: ICD-10-CM

## 2021-06-28 PROCEDURE — 73721 MRI JNT OF LWR EXTRE W/O DYE: CPT

## 2021-07-07 PROBLEM — M19.079 ARTHRITIS OF FOOT: Status: ACTIVE | Noted: 2021-07-07

## 2021-07-07 PROBLEM — R22.41 ANKLE MASS, RIGHT: Status: ACTIVE | Noted: 2021-07-07

## 2021-07-07 NOTE — PROGRESS NOTES
"Ankle Follow Up      Patient: Chastity Berrios    YOB: 1955 65 y.o. female    Chief Complaints: I have a bump on my ankle    History of Present Illness: Patient reports onset of a mass in the lateral aspect of her ankle that began on 6/16/2021 after she had been sitting crosslegged at and went to push herself up she had swelling in the lower aspect of her ankle and was seen at urgent care and felt to have a ganglion cyst.  She subsequently saw Nataliia on 6/21/2021 was sent for MRI and is here today for further evaluation.  She has no complaints of pain in the lateral ankle  HPI    ROS: No ankle pain  Past Medical History:   Diagnosis Date   • Angina pectoris (CMS/HCC)    • Aortic regurgitation     trace to mild   • Aortic systolic murmur on examination    • Aortic valvar stenosis    • Arrhythmia    • Arthralgia     both thumbs are getting stiff   • Chest pain    • Coronary artery disease    • Environmental allergies    • First degree AV block    • Heart palpitations    • Hyperlipidemia    • IGT (impaired glucose tolerance)    • Left carotid stenosis 3/9/2018   • Migraines    • Mild concentric left ventricular hypertrophy (LVH)    • Mitral regurgitation     trivial to mild   • Psoriasis     ELBOWS CALOS AND LEGS CALOS   • PSVT (paroxysmal supraventricular tachycardia) (CMS/HCC)    • Pulmonic regurgitation     trace   • Snoring    • SOB (shortness of breath)    • Spinal headache     AFTER CHILDBIRTH   • SVT (supraventricular tachycardia) (CMS/HCC)    • Wheezing     NOTHING CURRENT       Physical Exam:   Vitals:    07/08/21 1002   Temp: 96 °F (35.6 °C)   Weight: 81.6 kg (180 lb)   Height: 165.1 cm (65\")   PainSc: 0-No pain     Well developed with normal mood.  She is with her  and ambulates without antalgic gait or assistive device.  She was nontender over the dorsum of the talonavicular joint.  There was prominence with mass over the lateral aspect of the ankle measuring about 2 cm x 2-1/2 cm overlying " skin was intact but had some mild scaling but there was no evidence of open wound or infection.  The mass was minimally mobile and nontender to palpation.  There was no pain with ankle range of motion      Radiology: 3 views of the right ankle reviewed on our system from 6/21/2021 which show soft tissue prominence over the lateral aspect of the lateral malleolus but no obvious fracture or malalignment that but does have a relatively high arch.       MRI films and report of the right ankle dated 6/20/2021 reviewed which shows an ovoid cystic lesion within the subcutaneous fat anterolateral to the lateral malleolus measuring 2.5 x 1.2 x 2 cm.  It contacts the anterolateral cortex of the lateral malleolus but there is no underlying cortical or cancellous abnormality or scalloping and no evidence of communication with the peroneal tendon sheath and medial and lateral ligamentous structures appear intact.  This was felt by radiology to be potential chronic hematoma or seroma, possible adventitial bursal collection or ganglion cyst and solid lesion was felt to be unlikely although difficult to entirely exclude without IV contrast.    Mild degenerative changes of the talonavicular joint with mild subchondral edema within the central navicular proximally.  Small os trigonum is present      Assessment/Plan: 1.  Right ankle anterolateral mass  2.  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  She does not want to proceed with surgical excision we discussed aspiration but she would need to limit her activity for a week or 2 thereafter and has grandkids coming in this weekend.  We decided to hold off on any aspiration or injection or excision at this time.    She will protect the area make sure nothing is rubbing on that and not to sit with her foot tucked under her and try to avoid bumping  it.    Offered to see her back in a month for repeat examination and possible aspiration and injection but due to her schedule is can to be about about 6 to 7 weeks before she come back in so we will see her at that point and decide what to do going forward.

## 2021-07-08 ENCOUNTER — OFFICE VISIT (OUTPATIENT)
Dept: ORTHOPEDIC SURGERY | Facility: CLINIC | Age: 66
End: 2021-07-08

## 2021-07-08 VITALS — HEIGHT: 65 IN | BODY MASS INDEX: 29.99 KG/M2 | TEMPERATURE: 96 F | WEIGHT: 180 LBS

## 2021-07-08 DIAGNOSIS — R22.41 ANKLE MASS, RIGHT: Primary | ICD-10-CM

## 2021-07-08 DIAGNOSIS — M19.079 ARTHRITIS OF FOOT: ICD-10-CM

## 2021-07-08 PROCEDURE — 99213 OFFICE O/P EST LOW 20 MIN: CPT | Performed by: ORTHOPAEDIC SURGERY

## 2021-08-09 RX ORDER — CHOLECALCIFEROL (VITAMIN D3) 1250 MCG
50000 CAPSULE ORAL
Qty: 13 CAPSULE | Refills: 3 | Status: SHIPPED | OUTPATIENT
Start: 2021-08-09 | End: 2022-08-09

## 2021-08-23 ENCOUNTER — OFFICE VISIT (OUTPATIENT)
Dept: ORTHOPEDIC SURGERY | Facility: CLINIC | Age: 66
End: 2021-08-23

## 2021-08-23 VITALS — WEIGHT: 176 LBS | TEMPERATURE: 96.9 F | HEIGHT: 65 IN | BODY MASS INDEX: 29.32 KG/M2

## 2021-08-23 DIAGNOSIS — M19.079 ARTHRITIS OF FOOT: ICD-10-CM

## 2021-08-23 DIAGNOSIS — R22.41 ANKLE MASS, RIGHT: Primary | ICD-10-CM

## 2021-08-23 PROCEDURE — 99213 OFFICE O/P EST LOW 20 MIN: CPT | Performed by: ORTHOPAEDIC SURGERY

## 2021-08-23 PROCEDURE — 20612 ASPIRATE/INJ GANGLION CYST: CPT | Performed by: ORTHOPAEDIC SURGERY

## 2021-08-24 RX ORDER — LIDOCAINE HYDROCHLORIDE 10 MG/ML
INJECTION, SOLUTION EPIDURAL; INFILTRATION; INTRACAUDAL; PERINEURAL
Qty: 2 ML
Start: 2021-08-24 | End: 2021-11-05

## 2021-08-24 NOTE — PROGRESS NOTES
Ankle Follow Up      Patient: Chastity Berrios    YOB: 1955 65 y.o. female    Chief Complaints: Ankle is about the same    History of Present Illness: Patient was seen initially on 7/8/2021 reporting onset of a mass in the lateral aspect of her ankle that began on 6/16/2021 after she been sitting crosslegged and went to push herself up with swelling in the lower aspect of her ankle.  She been seen at urgent care and subsequently by Nataliia and had an MRI without IV contrast    At that time we discussed treatment options including observation attempted aspiration and injection or surgical excision and she did not want to attempt aspiration injection at that time and she was can have to limit her activities thereafter and was seen back today for further evaluation for treatment going forward.  In the interim she has developed somewhat of a psoriatic patch over the mass but has not had any drainage or breakdown and has multiple other small psoriatic areas on her leg.  The mass has not been painful     ROS: No ankle pain  Past Medical History:   Diagnosis Date   • Angina pectoris (CMS/HCC)    • Aortic regurgitation     trace to mild   • Aortic systolic murmur on examination    • Aortic valvar stenosis    • Arrhythmia    • Arthralgia     both thumbs are getting stiff   • Chest pain    • Coronary artery disease    • Environmental allergies    • First degree AV block    • Heart palpitations    • Hyperlipidemia    • IGT (impaired glucose tolerance)    • Left carotid stenosis 3/9/2018   • Migraines    • Mild concentric left ventricular hypertrophy (LVH)    • Mitral regurgitation     trivial to mild   • Psoriasis     ELBOWS CALOS AND LEGS CALOS   • PSVT (paroxysmal supraventricular tachycardia) (CMS/HCC)    • Pulmonic regurgitation     trace   • Snoring    • SOB (shortness of breath)    • Spinal headache     AFTER CHILDBIRTH   • SVT (supraventricular tachycardia) (CMS/HCC)    • Wheezing     NOTHING CURRENT  "      Physical Exam:   Vitals:    08/23/21 1021   Temp: 96.9 °F (36.1 °C)   Weight: 79.8 kg (176 lb)   Height: 165.1 cm (65\")   PainSc:   2     Well developed with normal mood.  On exam right ankle shows prominent mass over the lateral aspect of the ankle that measures about 2 cm x 2 and half centimeters again with some mild scaling and psoriatic patch may be a little more prominent than seen previously with several other small psoriatic areas on the leg but no open wound or sign of infection there is no erythema or drainage.  She had no pain with ankle range of motion.      Radiology:MRI films and report of the right ankle dated 6/20/2021 reviewed which shows an ovoid cystic lesion within the subcutaneous fat anterolateral to the lateral malleolus measuring 2.5 x 1.2 x 2 cm.  It contacts the anterolateral cortex of the lateral malleolus but there is no underlying cortical or cancellous abnormality or scalloping and no evidence of communication with the peroneal tendon sheath and medial and lateral ligamentous structures appear intact.  This was felt by radiology to be potential chronic hematoma or seroma, possible adventitial bursal collection or ganglion cyst and solid lesion was felt to be unlikely although difficult to entirely exclude without IV contrast.    Mild degenerative changes of the talonavicular joint with mild subchondral edema within the central navicular proximally.  Small os trigonum is present    Assessment/Plan:  1.  Right ankle anterolateral mass  2.  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 NDC 37147-651-77 lot # [id number] exp 7-1-24    I then attempted to aspirate with an 18-gauge needle " and was unable to get any return of any type of gelatinous fluid or material other than the lidocaine that had been injected.  As nothing was aspirated I elected not to perform any type of Depo-Medrol injection.  She tolerated this well and bandage was applied.    Discussed with her that this may be a solid lesion and that per radiology recommendations it would be best to get an MRI with IV contrast evaluate for solid lesion.    We will send her for the study to get a better evaluation of this as this is likely going to require surgical excision of the area.    They voiced clear understanding of the treatment algorithm going forward and we will see her back in about 4 weeks to review MRI and determine treatment course going forward.

## 2021-09-15 ENCOUNTER — HOSPITAL ENCOUNTER (OUTPATIENT)
Dept: MRI IMAGING | Facility: HOSPITAL | Age: 66
Discharge: HOME | End: 2021-09-15
Admitting: ORTHOPAEDIC SURGERY

## 2021-09-15 DIAGNOSIS — R22.41 ANKLE MASS, RIGHT: ICD-10-CM

## 2021-09-15 PROCEDURE — 73723 MRI JOINT LWR EXTR W/O&W/DYE: CPT

## 2021-09-15 PROCEDURE — 0 GADOBENATE DIMEGLUMINE 529 MG/ML SOLUTION: Performed by: ORTHOPAEDIC SURGERY

## 2021-09-15 PROCEDURE — 82565 ASSAY OF CREATININE: CPT

## 2021-09-15 PROCEDURE — A9577 INJ MULTIHANCE: HCPCS | Performed by: ORTHOPAEDIC SURGERY

## 2021-09-15 RX ADMIN — GADOBENATE DIMEGLUMINE 16 ML: 529 INJECTION, SOLUTION INTRAVENOUS at 21:13

## 2021-09-20 ENCOUNTER — OFFICE VISIT (OUTPATIENT)
Dept: ORTHOPEDIC SURGERY | Facility: CLINIC | Age: 66
End: 2021-09-20

## 2021-09-20 VITALS — WEIGHT: 176 LBS | BODY MASS INDEX: 29.32 KG/M2 | HEIGHT: 65 IN | TEMPERATURE: 97.1 F

## 2021-09-20 DIAGNOSIS — R22.41 ANKLE MASS, RIGHT: Primary | ICD-10-CM

## 2021-09-20 PROCEDURE — 99213 OFFICE O/P EST LOW 20 MIN: CPT | Performed by: ORTHOPAEDIC SURGERY

## 2021-09-20 NOTE — PROGRESS NOTES
Ankle Follow Up      Patient: Chastity Berrios    YOB: 1955 65 y.o. female    Chief Complaints: Ankle still about the same    History of Present Illness:Patient was seen initially on 7/8/2021 reporting onset of a mass in the lateral aspect of her ankle that began on 6/16/2021 after she been sitting crosslegged and went to push herself up with swelling in the lower aspect of her ankle.  She been seen at urgent care and subsequently by Nataliia and had an MRI without IV contrast     At that time we discussed treatment options including observation attempted aspiration and injection or surgical excision and she did not want to attempt aspiration injection at that time and she was can have to limit her activities thereafter.     She was subsequently seen on 8/23/2021 for further evaluation for treatment going forward.  In the interim she had developed somewhat of a psoriatic patch over the mass but had not had any drainage or breakdown and had multiple other small psoriatic areas on her leg.  The mass had not been painful.    I attempted aspiration of the mass that was thought to be cystic at that time and was unable to aspirate any material.  She was subsequently sent for repeat MRI with IV contrast as the previous one had not been done as such.    She is seen back today for review of MRI and said the ankle is about the same as it had been still has psoriatic patch over the lateral aspect of this mass as well as several other lesions on her leg that she said have been more active.  She denies any particular pain to the mass in the ankle but does report some occasional tingling and stinging and some throbbing if she is on it for too long or any shoes rub to that area.  HPI    ROS: ankle pain  Past Medical History:   Diagnosis Date   • Angina pectoris (CMS/HCC)    • Aortic regurgitation     trace to mild   • Aortic systolic murmur on examination    • Aortic valvar stenosis    • Arrhythmia    • Arthralgia      "both thumbs are getting stiff   • Chest pain    • Coronary artery disease    • Environmental allergies    • First degree AV block    • Heart palpitations    • Hyperlipidemia    • IGT (impaired glucose tolerance)    • Left carotid stenosis 3/9/2018   • Migraines    • Mild concentric left ventricular hypertrophy (LVH)    • Mitral regurgitation     trivial to mild   • Psoriasis     ELBOWS CALOS AND LEGS CALOS   • PSVT (paroxysmal supraventricular tachycardia) (CMS/HCC)    • Pulmonic regurgitation     trace   • Snoring    • SOB (shortness of breath)    • Spinal headache     AFTER CHILDBIRTH   • SVT (supraventricular tachycardia) (CMS/HCC)    • Wheezing     NOTHING CURRENT       Physical Exam:   Vitals:    09/20/21 1042   Temp: 97.1 °F (36.2 °C)   Weight: 79.8 kg (176 lb)   Height: 165.1 cm (65\")   PainSc:   3     Well developed with normal mood.  On exam there remains fullness over the anterolateral aspect of the ankle measuring about 2 cm x 2.5 cm with some scaling and psoriatic patch over the lateral aspect of this but without sign of infection.  She had no pain with ankle range of motion.      Radiology: MRI films and report dated 9/15/2020 one of the right ankle were reviewed which show a 2.5 cm x 1.2 cm x 2 cm cystic lesion within the subcutaneous fat anterolateral to the lateral malleolus exhibiting peripheral enhancement and tiny internal enhancing septation that did not appear to enhance centrally supporting that it was a cystic lesion.  There is a 2 to 3 mm T2 hypointense capsule without evidence of change compared to MRI from 6/28/2021.    MRI films and report of the right ankle dated 6/20/2021 reviewed which shows an ovoid cystic lesion within the subcutaneous fat anterolateral to the lateral malleolus measuring 2.5 x 1.2 x 2 cm.  It contacts the anterolateral cortex of the lateral malleolus but there is no underlying cortical or cancellous abnormality or scalloping and no evidence of communication with the " peroneal tendon sheath and medial and lateral ligamentous structures appear intact.  This was felt by radiology to be potential chronic hematoma or seroma, possible adventitial bursal collection or ganglion cyst and solid lesion was felt to be unlikely although difficult to entirely exclude without IV contrast.     Mild degenerative changes of the talonavicular joint with mild subchondral edema within the central navicular proximally.  Small os trigonum is present    Assessment/Plan:   1.  Right ankle anterolateral mass  2.  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  The only way to try to eradicate this would be operative removal with a small chance of recurrence however I am concerned about the psoriatic patch over the anterolateral aspect of the ankle overlying this lesion and that it could cause potential wound healing problems which obviously would like to avoid.    We will hold off on any surgical treatment at this time and she is going to avoid any exacerbating activities or shoewear that aggravates this.  She is going to get back in with her dermatologist about further evaluation of her psoriasis to see what can be done to get this under better control and we will see her back in 1 month to assess progress and determine treatment course going forward.

## 2021-09-23 ENCOUNTER — TELEPHONE (OUTPATIENT)
Dept: ORTHOPEDIC SURGERY | Facility: CLINIC | Age: 66
End: 2021-09-23

## 2021-09-23 NOTE — TELEPHONE ENCOUNTER
I called patient after receiving chart message from her and she has seen her but dermatologist and they are working on a program to minimize and hopefully eliminate the psoriatic patch over the area of her mass.  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    Reviewed with her that she is going to continue with that treatment for several weeks and we will see her back as scheduled in late October and should be able to get her on relatively soon thereafter and reviewed her that it would be an outpatient surgery but that she would be going to sleep forward and would require several weeks of initially nonweightbearing and then limited weightbearing to allow the wound to heal.  She appreciated the call and will call if she has any questions prior to her appointment.

## 2021-09-24 LAB — CREAT BLDA-MCNC: 1.3 MG/DL (ref 0.6–1.3)

## 2021-10-04 ENCOUNTER — OFFICE VISIT (OUTPATIENT)
Dept: INTERNAL MEDICINE | Facility: CLINIC | Age: 66
End: 2021-10-04

## 2021-10-04 DIAGNOSIS — Z23 NEED FOR VACCINATION: Primary | ICD-10-CM

## 2021-10-04 DIAGNOSIS — R73.02 IGT (IMPAIRED GLUCOSE TOLERANCE): ICD-10-CM

## 2021-10-04 DIAGNOSIS — Z79.899 MEDICATION MANAGEMENT: ICD-10-CM

## 2021-10-04 DIAGNOSIS — Z95.2 S/P AVR (AORTIC VALVE REPLACEMENT): ICD-10-CM

## 2021-10-04 PROCEDURE — G0008 ADMIN INFLUENZA VIRUS VAC: HCPCS | Performed by: INTERNAL MEDICINE

## 2021-10-04 PROCEDURE — 90662 IIV NO PRSV INCREASED AG IM: CPT | Performed by: INTERNAL MEDICINE

## 2021-10-04 NOTE — PATIENT INSTRUCTIONS
Influenza (Flu) Vaccine (Inactivated or Recombinant): What You Need to Know  1. Why get vaccinated?  Influenza vaccine can prevent influenza (flu).  Flu is a contagious disease that spreads around the [place] every year, usually between October and May. Anyone can get the flu, but it is more dangerous for some people. Infants and young children, people 65 years of age and older, pregnant women, and people with certain health conditions or a weakened immune system are at greatest risk of flu complications.  Pneumonia, bronchitis, sinus infections and ear infections are examples of flu-related complications. If you have a medical condition, such as heart disease, cancer or diabetes, flu can make it worse.  Flu can cause fever and chills, sore throat, muscle aches, fatigue, cough, headache, and runny or stuffy nose. Some people may have vomiting and diarrhea, though this is more common in children than adults.  Each year thousands of people in the [place] die from flu, and many more are hospitalized. Flu vaccine prevents millions of illnesses and flu-related visits to the doctor each year.  2. Influenza vaccine  CDC recommends everyone 6 months of age and older get vaccinated every flu season. Children 6 months through 8 years of age may need 2 doses during a single flu season. Everyone else needs only 1 dose each flu season.  It takes about 2 weeks for protection to develop after vaccination.  There are many flu viruses, and they are always changing. Each year a new flu vaccine is made to protect against three or four viruses that are likely to cause disease in the upcoming flu season. Even when the vaccine doesn't exactly match these viruses, it may still provide some protection.  Influenza vaccine does not cause flu.  Influenza vaccine may be given at the same time as other vaccines.  3. Talk with your health care provider  Tell your vaccine provider if the person getting the vaccine:  · Has had an  allergic reaction after a previous dose of influenza vaccine, or has any severe, life-threatening allergies.  · Has ever had Guillain-Barré Syndrome (also called GBS).  In some cases, your health care provider may decide to postpone influenza vaccination to a future visit.  People with minor illnesses, such as a cold, may be vaccinated. People who are moderately or severely ill should usually wait until they recover before getting influenza vaccine.  Your health care provider can give you more information.  4. Risks of a vaccine reaction  · Soreness, redness, and swelling where shot is given, fever, muscle aches, and headache can happen after influenza vaccine.  · There may be a very small increased risk of Guillain-Barré Syndrome (GBS) after inactivated influenza vaccine (the flu shot).  Young children who get the flu shot along with pneumococcal vaccine (PCV13), and/or DTaP vaccine at the same time might be slightly more likely to have a seizure caused by fever. Tell your health care provider if a child who is getting flu vaccine has ever had a seizure.  People sometimes faint after medical procedures, including vaccination. Tell your provider if you feel dizzy or have vision changes or ringing in the ears.  As with any medicine, there is a very remote chance of a vaccine causing a severe allergic reaction, other serious injury, or death.  5. What if there is a serious problem?  An allergic reaction could occur after the vaccinated person leaves the clinic. If you see signs of a severe allergic reaction (hives, swelling of the face and throat, difficulty breathing, a fast heartbeat, dizziness, or weakness), call 9-1-1 and get the person to the nearest hospital.  For other signs that concern you, call your health care provider.  Adverse reactions should be reported to the Vaccine Adverse Event Reporting System (VAERS). Your health care provider will usually file this report, or you can do it yourself. Visit the  VAERS website at www.vaers.[facility].gov or call 1-956.679.5106. VAERS is only for reporting reactions, and VAERS staff do not give medical advice.  6. The National Vaccine Injury Compensation Program  The National Vaccine Injury Compensation Program (VICP) is a federal program that was created to compensate people who may have been injured by certain vaccines. Visit the VICP website at www.[employer].gov/vaccinecompensation or call 1-832.171.9311 to learn about the program and about filing a claim. There is a time limit to file a claim for compensation.  7. How can I learn more?  · Ask your healthcare provider.  · Call your local or state health department.  · Contact the Centers for Disease Control and Prevention (CDC):  ? Call 1-905.842.2970 (1-712-GVK-INFO) or  ? Visit CDC's www.cdc.gov/flu  Vaccine Information Statement (Interim) Inactivated Influenza Vaccine (8/15/2019)  This information is not intended to replace advice given to you by your health care provider. Make sure you discuss any questions you have with your health care provider.  Document Revised: 12/09/2020 Document Reviewed: 12/09/2020  Elsevier Patient Education © 2021 [employer]

## 2021-10-18 DIAGNOSIS — L80 VITILIGO: Primary | ICD-10-CM

## 2021-10-19 ENCOUNTER — IMMUNIZATION (OUTPATIENT)
Dept: VACCINE CLINIC | Facility: HOSPITAL | Age: 66
End: 2021-10-19

## 2021-10-19 PROCEDURE — 0004A ADM SARSCOV2 30MCG/0.3ML BOOSTER: CPT | Performed by: INTERNAL MEDICINE

## 2021-10-19 PROCEDURE — 91300 HC SARSCOV02 VAC 30MCG/0.3ML IM: CPT | Performed by: INTERNAL MEDICINE

## 2021-10-25 ENCOUNTER — OFFICE VISIT (OUTPATIENT)
Dept: ORTHOPEDIC SURGERY | Facility: CLINIC | Age: 66
End: 2021-10-25

## 2021-10-25 VITALS — BODY MASS INDEX: 29.16 KG/M2 | TEMPERATURE: 97.5 F | WEIGHT: 175 LBS | HEIGHT: 65 IN

## 2021-10-25 DIAGNOSIS — R22.41 ANKLE MASS, RIGHT: Primary | ICD-10-CM

## 2021-10-25 PROCEDURE — 99214 OFFICE O/P EST MOD 30 MIN: CPT | Performed by: ORTHOPAEDIC SURGERY

## 2021-10-25 RX ORDER — CEFAZOLIN SODIUM 2 G/100ML
2 INJECTION, SOLUTION INTRAVENOUS ONCE
Status: CANCELLED | OUTPATIENT
Start: 2021-11-19 | End: 2021-10-25

## 2021-10-25 NOTE — PROGRESS NOTES
Ankle Follow Up      Patient: Chastity Berrios    YOB: 1955 65 y.o. female    Chief Complaints: Ankle is about the same but psoriasis has improved    History of Present Illness:Patient was seen initially on 7/8/2021 reporting onset of a mass in the lateral aspect of her ankle that began on 6/16/2021 after she been sitting crosslegged and went to push herself up with swelling in the lower aspect of her ankle.  She been seen at urgent care and subsequently by Nataliia and had an MRI without IV contrast     At that time we discussed treatment options including observation attempted aspiration and injection or surgical excision and she did not want to attempt aspiration injection at that time and she was can have to limit her activities thereafter.      She was subsequently seen on 8/23/2021 for further evaluation for treatment going forward.  In the interim she had developed somewhat of a psoriatic patch over the mass but had not had any drainage or breakdown and had multiple other small psoriatic areas on her leg.  The mass had not been painful.     I attempted aspiration of the mass that was thought to be cystic at that time and was unable to aspirate any material.  She was subsequently sent for repeat MRI with IV contrast as the previous one had not been done as such.     She was seen on 9/20/2021 ffor review of MRI and at that time her ankle was about the same as it had been and still had psoriatic patch over the lateral aspect of this mass as well as several other lesions on her leg that she said had been more active.  She denied any particular pain to the mass in the ankle but did report some occasional tingling and stinging and some throbbing if she was on it for too long or any shoes rub to that area.    We decided to hold off on surgery at that point till she saw her dermatologist.  She has since seen her dermatologist and her psoriatic area over the mass has now resolved but she had said that these  "dermatologist that it could potentially recur even after surgery but that they could \"deal with it\" and did not feel that it precluded surgery to the area.    HPI    ROS: ankle pain  Past Medical History:   Diagnosis Date   • Angina pectoris (HCC)    • Aortic regurgitation     trace to mild   • Aortic systolic murmur on examination    • Aortic valvar stenosis    • Arrhythmia    • Arthralgia     both thumbs are getting stiff   • Chest pain    • Coronary artery disease    • Environmental allergies    • First degree AV block    • Heart palpitations    • Hyperlipidemia    • IGT (impaired glucose tolerance)    • Left carotid stenosis 3/9/2018   • Migraines    • Mild concentric left ventricular hypertrophy (LVH)    • Mitral regurgitation     trivial to mild   • Psoriasis     ELBOWS CALOS AND LEGS CALOS   • PSVT (paroxysmal supraventricular tachycardia) ([facility])    • Pulmonic regurgitation     trace   • Snoring    • SOB (shortness of breath)    • Spinal headache     AFTER CHILDBIRTH   • SVT (supraventricular tachycardia) ([facility])    • Wheezing     NOTHING CURRENT       Physical Exam:   Vitals:    10/25/21 1127   Temp: 97.5 °F (36.4 °C)   TempSrc: Temporal   Weight: 79.4 kg (175 lb)   Height: 165.1 cm (65\")     Well developed with normal mood.  On exam there remains an area of fullness in the anterolateral right ankle.  This measures about 2 cm x 2.5 cm but the overlying skin is intact there is no scaling or psoriatic patch evident over it at this time She had no pain with ankle range of motion      Radiology:MRI films and report dated 9/15/2020 one of the right ankle were reviewed which show a 2.5 cm x 1.2 cm x 2 cm cystic lesion within the subcutaneous fat anterolateral to the lateral malleolus exhibiting peripheral enhancement and tiny internal enhancing septation that did not appear to enhance centrally supporting that it was a cystic lesion.  There is a 2 to 3 mm T2 hypointense capsule without evidence of change compared to MRI " from 6/28/2021.     MRI films and report of the right ankle dated 6/20/2021 reviewed which shows an ovoid cystic lesion within the subcutaneous fat anterolateral to the lateral malleolus measuring 2.5 x 1.2 x 2 cm.  It contacts the anterolateral cortex of the lateral malleolus but there is no underlying cortical or cancellous abnormality or scalloping and no evidence of communication with the peroneal tendon sheath and medial and lateral ligamentous structures appear intact.  This was felt by radiology to be potential chronic hematoma or seroma, possible adventitial bursal collection or ganglion cyst and solid lesion was felt to be unlikely although difficult to entirely exclude without IV contrast.     Mild degenerative changes of the talonavicular joint with mild subchondral edema within the central navicular proximally.  Small os trigonum is present      Assessment/Plan:   1.  Right ankle anterolateral mass  2.  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  I described the procedure and postoperative course in detail with she and her  as well as postoperative protocol and that she would likely need to be in a splint to allow the wound to rest and will be nonweightbearing for at least 10 to 14 days postoperatively and recommend they get a scooter.    She understands it may be several weeks thereafter before she is able to return to more activities to allow the wound to heal.    She and her  voiced clear understanding the operative procedure with associated risk benefits potential outcomes and complications which can include but not limited to heart attack stroke death pneumonia infection bleeding damage to blood vessels nerves or tendons blood clots pulmonary embolism persistent or worsening pain stiffness need for subsequent surgery with recurrence of mass  and failure to return to presurgery and precondition levels of activity as well as wound healing complications or exacerbation of dermatologic conditions.    We will plan to proceed with this on outpatient basis at mutually convenient time and she was encouraged to call if she has any questions prior to surgery

## 2021-11-05 ENCOUNTER — PRE-ADMISSION TESTING (OUTPATIENT)
Dept: PREADMISSION TESTING | Facility: HOSPITAL | Age: 66
End: 2021-11-05

## 2021-11-05 VITALS
HEART RATE: 88 BPM | RESPIRATION RATE: 16 BRPM | WEIGHT: 177.4 LBS | DIASTOLIC BLOOD PRESSURE: 69 MMHG | TEMPERATURE: 97.7 F | OXYGEN SATURATION: 100 % | HEIGHT: 65 IN | BODY MASS INDEX: 29.56 KG/M2 | SYSTOLIC BLOOD PRESSURE: 166 MMHG

## 2021-11-05 LAB
ALBUMIN SERPL-MCNC: 4.4 G/DL (ref 3.5–5.2)
ALBUMIN/GLOB SERPL: 1.5 G/DL
ALP SERPL-CCNC: 90 U/L (ref 39–117)
ALT SERPL W P-5'-P-CCNC: 23 U/L (ref 1–33)
ANION GAP SERPL CALCULATED.3IONS-SCNC: 6.2 MMOL/L (ref 5–15)
AST SERPL-CCNC: 18 U/L (ref 1–32)
BASOPHILS # BLD AUTO: 0.06 10*3/MM3 (ref 0–0.2)
BASOPHILS NFR BLD AUTO: 1 % (ref 0–1.5)
BILIRUB SERPL-MCNC: 0.3 MG/DL (ref 0–1.2)
BUN SERPL-MCNC: 14 MG/DL (ref 8–23)
BUN/CREAT SERPL: 12.5 (ref 7–25)
CALCIUM SPEC-SCNC: 9.3 MG/DL (ref 8.6–10.5)
CHLORIDE SERPL-SCNC: 101 MMOL/L (ref 98–107)
CHOLEST SERPL-MCNC: 205 MG/DL (ref 0–200)
CO2 SERPL-SCNC: 27.8 MMOL/L (ref 22–29)
CREAT SERPL-MCNC: 1.12 MG/DL (ref 0.57–1)
DEPRECATED RDW RBC AUTO: 44.4 FL (ref 37–54)
EOSINOPHIL # BLD AUTO: 0.32 10*3/MM3 (ref 0–0.4)
EOSINOPHIL NFR BLD AUTO: 5.6 % (ref 0.3–6.2)
ERYTHROCYTE [DISTWIDTH] IN BLOOD BY AUTOMATED COUNT: 14.8 % (ref 12.3–15.4)
GFR SERPL CREATININE-BSD FRML MDRD: 49 ML/MIN/1.73
GLOBULIN UR ELPH-MCNC: 3 GM/DL
GLUCOSE SERPL-MCNC: 110 MG/DL (ref 65–99)
HBA1C MFR BLD: 5.5 % (ref 4.8–5.6)
HCT VFR BLD AUTO: 33.9 % (ref 34–46.6)
HDLC SERPL QL: 4.27
HDLC SERPL-MCNC: 48 MG/DL (ref 40–60)
HGB BLD-MCNC: 10.8 G/DL (ref 12–15.9)
IMM GRANULOCYTES # BLD AUTO: 0.03 10*3/MM3 (ref 0–0.05)
IMM GRANULOCYTES NFR BLD AUTO: 0.5 % (ref 0–0.5)
LDLC SERPL CALC-MCNC: 128 MG/DL (ref 0–100)
LYMPHOCYTES # BLD AUTO: 1.81 10*3/MM3 (ref 0.7–3.1)
LYMPHOCYTES NFR BLD AUTO: 31.5 % (ref 19.6–45.3)
MCH RBC QN AUTO: 26.4 PG (ref 26.6–33)
MCHC RBC AUTO-ENTMCNC: 31.9 G/DL (ref 31.5–35.7)
MCV RBC AUTO: 82.9 FL (ref 79–97)
MONOCYTES # BLD AUTO: 0.57 10*3/MM3 (ref 0.1–0.9)
MONOCYTES NFR BLD AUTO: 9.9 % (ref 5–12)
NEUTROPHILS NFR BLD AUTO: 2.96 10*3/MM3 (ref 1.7–7)
NEUTROPHILS NFR BLD AUTO: 51.5 % (ref 42.7–76)
NRBC BLD AUTO-RTO: 0 /100 WBC (ref 0–0.2)
PLATELET # BLD AUTO: 257 10*3/MM3 (ref 140–450)
PMV BLD AUTO: 10.2 FL (ref 6–12)
POTASSIUM SERPL-SCNC: 3.8 MMOL/L (ref 3.5–5.2)
PROT SERPL-MCNC: 7.4 G/DL (ref 6–8.5)
QT INTERVAL: 375 MS
RBC # BLD AUTO: 4.09 10*6/MM3 (ref 3.77–5.28)
SODIUM SERPL-SCNC: 135 MMOL/L (ref 136–145)
T4 FREE SERPL-MCNC: 1.11 NG/DL (ref 0.93–1.7)
TRIGL SERPL-MCNC: 165 MG/DL (ref 0–150)
TSH SERPL DL<=0.05 MIU/L-ACNC: 2.49 UIU/ML (ref 0.27–4.2)
VLDLC SERPL-MCNC: 29 MG/DL (ref 5–40)
WBC # BLD AUTO: 5.75 10*3/MM3 (ref 3.4–10.8)

## 2021-11-05 PROCEDURE — 36415 COLL VENOUS BLD VENIPUNCTURE: CPT

## 2021-11-05 PROCEDURE — 80061 LIPID PANEL: CPT | Performed by: INTERNAL MEDICINE

## 2021-11-05 PROCEDURE — 83921 ORGANIC ACID SINGLE QUANT: CPT | Performed by: INTERNAL MEDICINE

## 2021-11-05 PROCEDURE — 83540 ASSAY OF IRON: CPT | Performed by: INTERNAL MEDICINE

## 2021-11-05 PROCEDURE — 85025 COMPLETE CBC W/AUTO DIFF WBC: CPT | Performed by: INTERNAL MEDICINE

## 2021-11-05 PROCEDURE — 93010 ELECTROCARDIOGRAM REPORT: CPT | Performed by: INTERNAL MEDICINE

## 2021-11-05 PROCEDURE — 84443 ASSAY THYROID STIM HORMONE: CPT | Performed by: INTERNAL MEDICINE

## 2021-11-05 PROCEDURE — 80053 COMPREHEN METABOLIC PANEL: CPT | Performed by: INTERNAL MEDICINE

## 2021-11-05 PROCEDURE — 83036 HEMOGLOBIN GLYCOSYLATED A1C: CPT | Performed by: INTERNAL MEDICINE

## 2021-11-05 PROCEDURE — 93005 ELECTROCARDIOGRAM TRACING: CPT

## 2021-11-05 PROCEDURE — 82728 ASSAY OF FERRITIN: CPT | Performed by: INTERNAL MEDICINE

## 2021-11-05 PROCEDURE — 84466 ASSAY OF TRANSFERRIN: CPT | Performed by: INTERNAL MEDICINE

## 2021-11-05 PROCEDURE — 84439 ASSAY OF FREE THYROXINE: CPT | Performed by: INTERNAL MEDICINE

## 2021-11-05 RX ORDER — CLOBETASOL PROPIONATE 0.5 MG/G
1 OINTMENT TOPICAL 2 TIMES DAILY PRN
COMMUNITY

## 2021-11-05 RX ORDER — NYSTATIN AND TRIAMCINOLONE ACETONIDE 100000; 1 [USP'U]/G; MG/G
1 OINTMENT TOPICAL 2 TIMES DAILY PRN
COMMUNITY

## 2021-11-08 DIAGNOSIS — D64.9 LOW HEMOGLOBIN: ICD-10-CM

## 2021-11-08 DIAGNOSIS — R79.89 ABNORMAL CBC: Primary | ICD-10-CM

## 2021-11-08 LAB
FERRITIN SERPL-MCNC: 19.3 NG/ML (ref 13–150)
IRON 24H UR-MRATE: 35 MCG/DL (ref 37–145)
IRON SATN MFR SERPL: 8 % (ref 20–50)
TIBC SERPL-MCNC: 451 MCG/DL (ref 298–536)
TRANSFERRIN SERPL-MCNC: 303 MG/DL (ref 200–360)

## 2021-11-08 NOTE — PROGRESS NOTES
Lab addon was accidentally sent to [facility], instead to [facility]. Verbally called main lab to makes sure they can also add MMA, along with additional anemia panel.

## 2021-11-12 LAB
Lab: NORMAL
METHYLMALONATE SERPL-SCNC: 190 NMOL/L (ref 0–378)

## 2021-11-15 ENCOUNTER — OFFICE VISIT (OUTPATIENT)
Dept: INTERNAL MEDICINE | Facility: CLINIC | Age: 66
End: 2021-11-15

## 2021-11-15 VITALS
DIASTOLIC BLOOD PRESSURE: 79 MMHG | TEMPERATURE: 96.2 F | RESPIRATION RATE: 16 BRPM | HEIGHT: 65 IN | WEIGHT: 180 LBS | BODY MASS INDEX: 29.99 KG/M2 | SYSTOLIC BLOOD PRESSURE: 110 MMHG | OXYGEN SATURATION: 96 % | HEART RATE: 98 BPM

## 2021-11-15 DIAGNOSIS — I47.1 PAT (PAROXYSMAL ATRIAL TACHYCARDIA) (HCC): Primary | ICD-10-CM

## 2021-11-15 DIAGNOSIS — E55.9 VITAMIN D DEFICIENCY: ICD-10-CM

## 2021-11-15 DIAGNOSIS — R73.02 IGT (IMPAIRED GLUCOSE TOLERANCE): ICD-10-CM

## 2021-11-15 DIAGNOSIS — D64.9 LOW HEMOGLOBIN: ICD-10-CM

## 2021-11-15 DIAGNOSIS — Z95.2 S/P AVR (AORTIC VALVE REPLACEMENT): ICD-10-CM

## 2021-11-15 LAB
EXPIRATION DATE 2: NORMAL
EXPIRATION DATE 3: NORMAL
EXPIRATION DATE: NORMAL
GASTROCULT GAST QL: NEGATIVE
HEMOCCULT SP2 STL QL: NEGATIVE
HEMOCCULT SP3 STL QL: NEGATIVE
Lab: NORMAL

## 2021-11-15 PROCEDURE — G0439 PPPS, SUBSEQ VISIT: HCPCS | Performed by: INTERNAL MEDICINE

## 2021-11-15 PROCEDURE — 82274 ASSAY TEST FOR BLOOD FECAL: CPT | Performed by: INTERNAL MEDICINE

## 2021-11-15 PROCEDURE — 1170F FXNL STATUS ASSESSED: CPT | Performed by: INTERNAL MEDICINE

## 2021-11-15 PROCEDURE — 1160F RVW MEDS BY RX/DR IN RCRD: CPT | Performed by: INTERNAL MEDICINE

## 2021-11-15 PROCEDURE — 99214 OFFICE O/P EST MOD 30 MIN: CPT | Performed by: INTERNAL MEDICINE

## 2021-11-15 NOTE — PROGRESS NOTES
The ABCs of the Annual Wellness Visit  Subsequent Medicare Wellness Visit    Chief Complaint   Patient presents with   • Medicare Wellness-subsequent      Subjective    History of Present Illness:  Chastity Berrios is a 65 y.o. female who presents for a Subsequent Medicare Wellness Visit.    The following portions of the patient's history were reviewed and   updated as appropriate: allergies, current medications, past family history, past medical history, past social history, past surgical history and problem list.    Compared to one year ago, the patient feels her physical   health is the same.    Compared to one year ago, the patient feels her mental   health is the same.    Recent Hospitalizations:  This patient has had a [facility] admission record on file within the last 365 days.    Current Medical Providers:  Patient Care Team:  Alvaro Small MD as PCP - General (Internal Medicine)  Alvaro Small MD as PCP - Internal Medicine  Jose Bonner MD as Consulting Physician (Cardiology)  Fabián Morales MD as Consulting Physician (Cardiology)    Outpatient Medications Prior to Visit   Medication Sig Dispense Refill   • acetaminophen (TYLENOL) 325 MG tablet Take 2 tablets by mouth Every 6 (Six) Hours As Needed for Mild Pain . 30 tablet 0   • aspirin 81 MG EC tablet Take 1 tablet by mouth Daily. (Patient taking differently: Take 81 mg by mouth Daily. HOLDING FOR 7 DAYS PRIOR TO OR PER MD GUIDELINE) 60 tablet 1   • calcipotriene (DOVONOX) 0.005 % ointment Apply 1 application topically to the appropriate area as directed 2 (Two) Times a Day As Needed.     • Cholecalciferol (Vitamin D3) 1.25 MG (87977 UT) capsule Take 1 capsule by mouth Every 7 (Seven) Days. (Patient taking differently: Take 50,000 Units by mouth Every 7 (Seven) Days. SATURDAYS    HOLDING FOR OR) 13 capsule 3   • clobetasol (TEMOVATE) 0.05 % ointment Apply 1 application topically to the appropriate area as directed 2 (Two) Times a Day  As Needed.     • FIBER ADULT GUMMIES PO Take 2 tablets by mouth Daily.     • fluticasone (FLONASE) 50 MCG/ACT nasal spray 2 sprays into each nostril Daily As Needed.     • loratadine (CLARITIN) 10 MG tablet Take 10 mg by mouth Daily.     • metoprolol succinate XL (TOPROL-XL) 25 MG 24 hr tablet Take 1 tablet by mouth Daily. (Patient taking differently: Take 25 mg by mouth Every Night.) 90 tablet 3   • Multiple Vitamins-Minerals (MULTIVITAMIN ADULTS PO) Take 1 tablet by mouth Daily. HOLDING FOR OR X 7 DAYS     • nystatin-triamcinolone (MYCOLOG) 022560-6.1 UNIT/GM-% ointment Apply 1 application topically to the appropriate area as directed 2 (Two) Times a Day As Needed.     • sennosides-docusate (senna-docusate sodium) 8.6-50 MG per tablet Take 1 tablet by mouth 2 (Two) Times a Day. (Patient taking differently: Take 1 tablet by mouth Daily.) 60 tablet 0     Facility-Administered Medications Prior to Visit   Medication Dose Route Frequency Provider Last Rate Last Admin   • Chlorhexidine Gluconate Cloth 2 % pads 1 application  1 application Topical Q12H PRN Olinda, Kaylah, APRN           No opioid medication identified on active medication list. I have reviewed chart for other potential  high risk medication/s and harmful drug interactions in the elderly.          Aspirin is on active medication list. Aspirin use is indicated based on review of current medical condition/s. Pros and cons of this therapy have been discussed today. Benefits of this medication outweigh potential harm.  Patient has been encouraged to continue taking this medication.  .      Patient Active Problem List   Diagnosis   • Psoriasis   • Paget's bone disease   • Perennial allergic rhinitis   • PAT (paroxysmal atrial tachycardia) ([facility])   • Migraine without aura and without status migrainosus, not intractable   • IGT (impaired glucose tolerance)   • Osteopenia   • Vitamin D deficiency   • Left carotid stenosis   • S/P AVR (aortic valve replacement)  "  • Diverticulitis of large intestine with abscess without bleeding   • Coronary artery disease   • Arthritis of foot   • Ankle mass, right     Advance Care Planning  Advance Directive is not on file.  ACP discussion was held with the patient during this visit. Patient does not have an advance directive, information provided.          Objective    Vitals:    11/15/21 1314   BP: 110/79   Pulse: 98   Resp: 16   Temp: 96.2 °F (35.7 °C)   SpO2: 96%   Weight: 81.6 kg (180 lb)   Height: 165.1 cm (65\")     BMI Readings from Last 1 Encounters:   11/15/21 29.95 kg/m²   BMI is above normal parameters. Recommendations include: exercise counseling and nutrition counseling    Does the patient have evidence of cognitive impairment? No    Physical Exam  Lab Results   Component Value Date    TRIG 165 (H) 11/05/2021    HDL 48 11/05/2021     (H) 11/05/2021    VLDL 29 11/05/2021    HGBA1C 5.50 11/05/2021            HEALTH RISK ASSESSMENT    Smoking Status:  Social History     Tobacco Use   Smoking Status Never Smoker   Smokeless Tobacco Never Used     Alcohol Consumption:  Social History     Substance and Sexual Activity   Alcohol Use Yes   • Alcohol/week: 1.0 standard drink   • Types: 1 Glasses of wine per week    Comment: social / caffeine use     Fall Risk Screen:    GORDON Fall Risk Assessment has not been completed.    Depression Screening:  PHQ-2/PHQ-9 Depression Screening 11/15/2021   Little interest or pleasure in doing things 0   Feeling down, depressed, or hopeless 0   Trouble falling or staying asleep, or sleeping too much 0   Feeling tired or having little energy 0   Poor appetite or overeating 0   Feeling bad about yourself - or that you are a failure or have let yourself or your family down 0   Trouble concentrating on things, such as reading the newspaper or watching television 0   Moving or speaking so slowly that other people could have noticed. Or the opposite - being so fidgety or restless that you have been " moving around a lot more than usual 0   Thoughts that you would be better off dead, or of hurting yourself in some way 0   Total Score 0   If you checked off any problems, how difficult have these problems made it for you to do your work, take care of things at home, or get along with other people? -       Health Habits and Functional and Cognitive Screening:  Functional & Cognitive Status 11/15/2021   Do you have difficulty preparing food and eating? No   Do you have difficulty bathing yourself, getting dressed or grooming yourself? No   Do you have difficulty using the toilet? No   Do you have difficulty moving around from place to place? No   Do you have trouble with steps or getting out of a bed or a chair? -   Current Diet Well Balanced Diet   Dental Exam Not up to date   Eye Exam Up to date   Exercise (times per week) 3 times per week   Current Exercises Include Walking;Cardiovascular Workout   Current Exercise Activities Include -   Do you need help using the phone?  No   Are you deaf or do you have serious difficulty hearing?  No   Do you need help with transportation? No   Do you need help shopping? No   Do you need help preparing meals?  No   Do you need help with housework?  No   Do you need help with laundry? No   Do you need help taking your medications? No   Do you need help managing money? No   Do you ever drive or ride in a car without wearing a seat belt? -   Have you felt unusual stress, anger or loneliness in the last month? No   Who do you live with? Spouse   If you need help, do you have trouble finding someone available to you? No   Have you been bothered in the last four weeks by sexual problems? No   Do you have difficulty concentrating, remembering or making decisions? No       Age-appropriate Screening Schedule:  Refer to the list below for future screening recommendations based on patient's age, sex and/or medical conditions. Orders for these recommended tests are listed in the plan  section. The patient has been provided with a written plan.    Health Maintenance   Topic Date Due   • TDAP/TD VACCINES (2 - Td or Tdap) 05/16/2021   • LIPID PANEL  11/05/2022   • MAMMOGRAM  01/29/2023   • DXA SCAN  01/29/2023   • INFLUENZA VACCINE  Completed   • ZOSTER VACCINE  Completed   • PAP SMEAR  Discontinued              Assessment/Plan   CMS Preventative Services Quick Reference  Risk Factors Identified During Encounter  Fall Risk-High or Moderate  Immunizations Discussed/Encouraged (specific Immunizations; Tdap  The above risks/problems have been discussed with the patient.  Follow up actions/plans if indicated are seen below in the Assessment/Plan Section.  Pertinent information has been shared with the patient in the After Visit Summary.    There are no diagnoses linked to this encounter.    Follow Up:   No follow-ups on file.     An After Visit Summary and PPPS were made available to the patient.        I spent 15 minutes caring for Chastity on this date of service. This time includes time spent by me in the following activities:preparing for the visit and reviewing tests

## 2021-11-15 NOTE — PROGRESS NOTES
Subjective   Chastity Berrios is a 65 y.o. female.     Chief Complaint   Patient presents with   • Medicare Wellness-subsequent         In for recheck of aortic stenosis.  An aortic valve replacement 2/20/2018.  She is completely asymptomatic.  No angina.  No congestive heart failure.  No syncope or presyncope.  Got a porcine aVR.  No history of recent palpitations or PSVT.    Hyperglycemia  This is a chronic problem. The current episode started more than 1 year ago. The problem occurs constantly. The problem has been unchanged. Pertinent negatives include no abdominal pain, chest pain or coughing. Nothing aggravates the symptoms. The treatment provided no relief.        The following portions of the patient's history were reviewed and updated as appropriate: allergies, current medications, past social history and problem list.    Outpatient Medications Marked as Taking for the 11/15/21 encounter (Office Visit) with Alvaro Small MD   Medication Sig Dispense Refill   • acetaminophen (TYLENOL) 325 MG tablet Take 2 tablets by mouth Every 6 (Six) Hours As Needed for Mild Pain . 30 tablet 0   • aspirin 81 MG EC tablet Take 1 tablet by mouth Daily. (Patient taking differently: Take 81 mg by mouth Daily. HOLDING FOR 7 DAYS PRIOR TO OR PER MD GUIDELINE) 60 tablet 1   • calcipotriene (DOVONOX) 0.005 % ointment Apply 1 application topically to the appropriate area as directed 2 (Two) Times a Day As Needed.     • Cholecalciferol (Vitamin D3) 1.25 MG (83300 UT) capsule Take 1 capsule by mouth Every 7 (Seven) Days. (Patient taking differently: Take 50,000 Units by mouth Every 7 (Seven) Days. SATURDAYS    HOLDING FOR OR) 13 capsule 3   • clobetasol (TEMOVATE) 0.05 % ointment Apply 1 application topically to the appropriate area as directed 2 (Two) Times a Day As Needed.     • FIBER ADULT GUMMIES PO Take 2 tablets by mouth Daily.     • fluticasone (FLONASE) 50 MCG/ACT nasal spray 2 sprays into each nostril Daily As Needed.     •  loratadine (CLARITIN) 10 MG tablet Take 10 mg by mouth Daily.     • metoprolol succinate XL (TOPROL-XL) 25 MG 24 hr tablet Take 1 tablet by mouth Daily. (Patient taking differently: Take 25 mg by mouth Every Night.) 90 tablet 3   • Multiple Vitamins-Minerals (MULTIVITAMIN ADULTS PO) Take 1 tablet by mouth Daily. HOLDING FOR OR X 7 DAYS     • nystatin-triamcinolone (MYCOLOG) 599013-4.1 UNIT/GM-% ointment Apply 1 application topically to the appropriate area as directed 2 (Two) Times a Day As Needed.     • sennosides-docusate (senna-docusate sodium) 8.6-50 MG per tablet Take 1 tablet by mouth 2 (Two) Times a Day. (Patient taking differently: Take 1 tablet by mouth Daily.) 60 tablet 0       Review of Systems   Respiratory: Negative for cough, shortness of breath and wheezing.    Cardiovascular: Negative for chest pain, palpitations and leg swelling.   Gastrointestinal: Negative for abdominal pain, constipation and diarrhea.       Objective   Vitals:    11/15/21 1314   BP: 110/79   Pulse: 98   Resp: 16   Temp: 96.2 °F (35.7 °C)   SpO2: 96%          11/15/21  1314   Weight: 81.6 kg (180 lb)    [unfilled]  Body mass index is 29.95 kg/m².      Physical Exam   Constitutional: She appears well-developed.   Neck: No thyromegaly present.   Cardiovascular: Normal rate and regular rhythm. Exam reveals no gallop.   Murmur ( ) heard.   Crescendo decrescendo systolic murmur is present with a grade of 2/6.  Short grade 2/6 FLOR at the aortic area, grade 2 diastolic blow aortic area   Pulmonary/Chest: Effort normal and breath sounds normal. No respiratory distress. She has no wheezes. She has no rales.   Abdominal: Soft. Normal appearance and bowel sounds are normal. She exhibits no mass. There is no abdominal tenderness. There is no guarding.   Neurological: She is alert. Abnormal coordination:          Problems Addressed this Visit        Cardiac and Vasculature    PAT (paroxysmal atrial tachycardia) ([facility]) - Primary    S/P AVR  (aortic valve replacement)       Endocrine and Metabolic    IGT (impaired glucose tolerance)    Vitamin D deficiency      Diagnoses       Codes Comments    PAT (paroxysmal atrial tachycardia) (H[facility])    -  Primary ICD-10-CM: I47.1  ICD-9-CM: 427.0     S/P AVR (aortic valve replacement)     ICD-10-CM: Z95.2  ICD-9-CM: V43.3     IGT (impaired glucose tolerance)     ICD-10-CM: R73.02  ICD-9-CM: 790.22     Vitamin D deficiency     ICD-10-CM: E55.9  ICD-9-CM: 268.9         Assessment/Plan   In for recheck of AVR and IGT.  History of PSVT.  History of Paget's disease.  Overall health is doing very well.  She's done great since he aVR.  No cardiac symptoms.  She got annual lab work today November 2021 with CBC, CMP, lipids, A1c.  Labs look great.  Lipids remain borderline.  She had actually no evidence of CAD.  Her lab work is reviewed today.  It is excellent other than developing iron deficiency anemia.  She is a regular blood donor however.  Advised to take some daily iron to build her blood up quicker.  Unfortunately she does have problem with constipation on iron.  That is going to present a problem for her.  The other option would be to give blood less frequently.  We'll follow-up one year.  Annual wellness visit today November 2021.    The above information was reviewed again today 11/15/21.  It continues to be accurate as reflected above and is unchanged.  History, physical and review of systems all reviewed and are unchanged.  Medications were reviewed today and continue the current dosing.    PPE today includes face mask and eye shield.         Dragon disclaimer:   Much of this encounter note is an electronic transcription/translation of spoken language to printed text. The electronic translation of spoken language may permit erroneous, or at times, nonsensical words or phrases to be inadvertently transcribed; Although I have reviewed the note for such errors, some may still exist.

## 2021-11-17 ENCOUNTER — OFFICE VISIT (OUTPATIENT)
Dept: CARDIOLOGY | Facility: CLINIC | Age: 66
End: 2021-11-17

## 2021-11-17 ENCOUNTER — LAB (OUTPATIENT)
Dept: LAB | Facility: HOSPITAL | Age: 66
End: 2021-11-17

## 2021-11-17 VITALS
OXYGEN SATURATION: 98 % | WEIGHT: 176 LBS | SYSTOLIC BLOOD PRESSURE: 146 MMHG | DIASTOLIC BLOOD PRESSURE: 80 MMHG | BODY MASS INDEX: 29.32 KG/M2 | HEIGHT: 65 IN | HEART RATE: 85 BPM

## 2021-11-17 DIAGNOSIS — I25.10 CORONARY ARTERY DISEASE INVOLVING NATIVE CORONARY ARTERY OF NATIVE HEART WITHOUT ANGINA PECTORIS: Primary | ICD-10-CM

## 2021-11-17 DIAGNOSIS — Z95.2 S/P AVR (AORTIC VALVE REPLACEMENT): ICD-10-CM

## 2021-11-17 DIAGNOSIS — I47.1 PAT (PAROXYSMAL ATRIAL TACHYCARDIA) (HCC): ICD-10-CM

## 2021-11-17 LAB — SARS-COV-2 ORF1AB RESP QL NAA+PROBE: NOT DETECTED

## 2021-11-17 PROCEDURE — U0004 COV-19 TEST NON-CDC HGH THRU: HCPCS

## 2021-11-17 PROCEDURE — 99214 OFFICE O/P EST MOD 30 MIN: CPT | Performed by: INTERNAL MEDICINE

## 2021-11-17 PROCEDURE — U0005 INFEC AGEN DETEC AMPLI PROBE: HCPCS

## 2021-11-17 PROCEDURE — C9803 HOPD COVID-19 SPEC COLLECT: HCPCS

## 2021-11-17 NOTE — PROGRESS NOTES
"      CARDIOLOGY    Jose Bonner MD    ENCOUNTER DATE:  11/17/2021    Chastity Berrios / 65 y.o. / female        CHIEF COMPLAINT / REASON FOR OFFICE VISIT     S/P AVR (aortic valve replacement) (11/17/2020 Follow up) and Surgery clearance      HISTORY OF PRESENT ILLNESS       HPI  Chastity Berrios is a 65 y.o. female who presents today for       The following portions of the patient's history were reviewed and updated as appropriate: allergies, current medications, past family history, past medical history, past social history, past surgical history and problem list.      VITAL SIGNS     Visit Vitals  /80 (BP Location: Left arm)   Pulse 85   Ht 165.1 cm (65\")   Wt 79.8 kg (176 lb)   SpO2 98%   BMI 29.29 kg/m²         Wt Readings from Last 3 Encounters:   11/17/21 79.8 kg (176 lb)   11/15/21 81.6 kg (180 lb)   11/05/21 80.5 kg (177 lb 6.4 oz)     Body mass index is 29.29 kg/m².      REVIEW OF SYSTEMS   Review of Systems   All other systems reviewed and are negative.          PHYSICAL EXAMINATION     Vitals reviewed.   Constitutional:       Appearance: Healthy appearance.   Pulmonary:      Breath sounds: Normal breath sounds.   Cardiovascular:      Normal rate. Regular rhythm. Normal S1. Normal S2.      Murmurs: There is a grade 1/6 harsh midsystolic murmur at the URSB.      No gallop. No click. No rub.   Pulses:     Intact distal pulses.   Edema:     Peripheral edema absent.   Neurological:      Mental Status: Alert.           REVIEWED DATA     Procedures    Cardiac Procedures:  Interpretation Summary  11/17/2020    · Calculated left ventricular EF = 62.1% Estimated left ventricular EF was in agreement with the calculated left ventricular EF. Left ventricular systolic function is normal.  · Left ventricular wall thickness is consistent with concentric hypertrophy.  · There is a bioprosthetic aortic valve present. Mild paravalvular regurgitation is present in the prosthetic aortic valve.  · Peak velocity of the " flow distal to the aortic valve is 254 cm/s. Aortic valve maximum pressure gradient is 25.8 mmHg. Aortic valve mean pressure gradient is 13 mmHg. Aortic valve dimensionless index is 0.4 .  · Aortic valve area is 1.3 cm2.  · Mild tricuspid valve regurgitation is present  · Estimated right ventricular systolic pressure from tricuspid regurgitation is normal (<35 mmHg). Calculated right ventricular systolic pressure from tricuspid regurgitation is 22 mmHg.  · Compared with prior on 11/6/2019 no significant difference    1.     Lipid Panel    Lipid Panel 11/5/21   Total Cholesterol 205 (A)   Triglycerides 165 (A)   HDL Cholesterol 48   VLDL Cholesterol 29   LDL Cholesterol  128 (A)   (A) Abnormal value                ASSESSMENT & PLAN      Diagnosis Plan   1. Coronary artery disease involving native coronary artery of native heart without angina pectoris     2. PAT (paroxysmal atrial tachycardia) ([facility])     3. S/P AVR (aortic valve replacement)           SUMMARY/DISCUSSION  1. Coronary artery disease clinically stable doing well no issues  2. Aortic valve replacement.  Patient had an echocardiogram done a year ago with trivial perivalvular leak.  He can still hear the murmur today.  3. PACs  4. Patient low risk for surgery would proceed as clinically indicated.  She did ask about antibiotics perioperatively.  I would err on giving her some after surgery.  However she gets intraoperative IV antibiotics it may not be necessary.  I would leave that up to orthopedics discretion.        MEDICATIONS         Discharge Medications          Accurate as of November 17, 2021  1:53 PM. If you have any questions, ask your nurse or doctor.            Changes to Medications      Instructions Start Date   aspirin 81 MG EC tablet  What changed: additional instructions   81 mg, Oral, Daily      metoprolol succinate XL 25 MG 24 hr tablet  Commonly known as: TOPROL-XL  What changed: when to take this   25 mg, Oral, Daily       sennosides-docusate 8.6-50 MG per tablet  Commonly known as: PERICOLACE  What changed: when to take this   1 tablet, Oral, 2 Times Daily      Vitamin D3 1.25 MG (85584 UT) capsule  What changed: additional instructions   50,000 Units, Oral, Every 7 Days         Continue These Medications      Instructions Start Date   acetaminophen 325 MG tablet  Commonly known as: TYLENOL   650 mg, Oral, Every 6 Hours PRN      calcipotriene 0.005 % ointment  Commonly known as: DOVONOX   1 application, Topical, 2 Times Daily PRN      Claritin 10 MG tablet  Generic drug: loratadine   10 mg, Oral, Daily      clobetasol 0.05 % ointment  Commonly known as: TEMOVATE   1 application, Topical, 2 Times Daily PRN      FIBER ADULT GUMMIES PO   2 tablets, Oral, Daily      fluticasone 50 MCG/ACT nasal spray  Commonly known as: FLONASE   2 sprays, Nasal, Daily PRN      multivitamin with minerals tablet tablet   1 tablet, Oral, Daily, HOLDING FOR OR X 7 DAYS      nystatin-triamcinolone 443015-1.1 UNIT/GM-% ointment  Commonly known as: MYCOLOG   1 application, Topical, 2 Times Daily PRN                 **Dragon Disclaimer:   Much of this encounter note is an electronic transcription/translation of spoken language to printed text. The electronic translation of spoken language may permit erroneous, or at times, nonsensical words or phrases to be inadvertently transcribed. Although I have reviewed the note for such errors, some may still exist.

## 2021-11-19 ENCOUNTER — ANESTHESIA (OUTPATIENT)
Dept: PERIOP | Facility: HOSPITAL | Age: 66
End: 2021-11-19

## 2021-11-19 ENCOUNTER — ANESTHESIA EVENT (OUTPATIENT)
Dept: PERIOP | Facility: HOSPITAL | Age: 66
End: 2021-11-19

## 2021-11-19 ENCOUNTER — HOSPITAL ENCOUNTER (OUTPATIENT)
Facility: HOSPITAL | Age: 66
Setting detail: HOSPITAL OUTPATIENT SURGERY
Discharge: HOME | End: 2021-11-19
Attending: ORTHOPAEDIC SURGERY | Admitting: ORTHOPAEDIC SURGERY

## 2021-11-19 VITALS
DIASTOLIC BLOOD PRESSURE: 76 MMHG | TEMPERATURE: 98 F | RESPIRATION RATE: 16 BRPM | OXYGEN SATURATION: 97 % | SYSTOLIC BLOOD PRESSURE: 145 MMHG | HEART RATE: 71 BPM

## 2021-11-19 DIAGNOSIS — R22.41 ANKLE MASS, RIGHT: Primary | ICD-10-CM

## 2021-11-19 PROCEDURE — 25010000002 MEPIVACAINE PF 2 % SOLUTION 20 ML VIAL: Performed by: ANESTHESIOLOGY

## 2021-11-19 PROCEDURE — 25010000002 ROPIVACAINE PER 1 MG: Performed by: ANESTHESIOLOGY

## 2021-11-19 PROCEDURE — 25010000002 FENTANYL CITRATE (PF) 50 MCG/ML SOLUTION: Performed by: NURSE ANESTHETIST, CERTIFIED REGISTERED

## 2021-11-19 PROCEDURE — 25010000002 ONDANSETRON PER 1 MG: Performed by: NURSE ANESTHETIST, CERTIFIED REGISTERED

## 2021-11-19 PROCEDURE — 25010000002 PROPOFOL 10 MG/ML EMULSION: Performed by: NURSE ANESTHETIST, CERTIFIED REGISTERED

## 2021-11-19 PROCEDURE — 0 CEFAZOLIN IN DEXTROSE 2-4 GM/100ML-% SOLUTION: Performed by: ORTHOPAEDIC SURGERY

## 2021-11-19 PROCEDURE — 25010000002 DEXAMETHASONE PER 1 MG: Performed by: ANESTHESIOLOGY

## 2021-11-19 PROCEDURE — 88304 TISSUE EXAM BY PATHOLOGIST: CPT | Performed by: ORTHOPAEDIC SURGERY

## 2021-11-19 PROCEDURE — 27632 EXC LEG/ANKLE LES SC 3 CM/>: CPT | Performed by: ORTHOPAEDIC SURGERY

## 2021-11-19 PROCEDURE — 25010000002 MIDAZOLAM PER 1 MG: Performed by: ANESTHESIOLOGY

## 2021-11-19 PROCEDURE — 76942 ECHO GUIDE FOR BIOPSY: CPT | Performed by: ORTHOPAEDIC SURGERY

## 2021-11-19 PROCEDURE — 25010000002 FENTANYL CITRATE (PF) 50 MCG/ML SOLUTION: Performed by: ANESTHESIOLOGY

## 2021-11-19 PROCEDURE — 25010000002 DEXAMETHASONE PER 1 MG: Performed by: NURSE ANESTHETIST, CERTIFIED REGISTERED

## 2021-11-19 RX ORDER — CEPHALEXIN 500 MG/1
500 CAPSULE ORAL EVERY 6 HOURS
Qty: 8 CAPSULE | Refills: 0 | Status: SHIPPED | OUTPATIENT
Start: 2021-11-19 | End: 2021-11-21

## 2021-11-19 RX ORDER — DEXAMETHASONE SODIUM PHOSPHATE 10 MG/ML
INJECTION INTRAMUSCULAR; INTRAVENOUS AS NEEDED
Status: DISCONTINUED | OUTPATIENT
Start: 2021-11-19 | End: 2021-11-19 | Stop reason: SURG

## 2021-11-19 RX ORDER — FAMOTIDINE 10 MG/ML
20 INJECTION, SOLUTION INTRAVENOUS ONCE
Status: COMPLETED | OUTPATIENT
Start: 2021-11-19 | End: 2021-11-19

## 2021-11-19 RX ORDER — DEXAMETHASONE SODIUM PHOSPHATE 4 MG/ML
INJECTION, SOLUTION INTRA-ARTICULAR; INTRALESIONAL; INTRAMUSCULAR; INTRAVENOUS; SOFT TISSUE
Status: COMPLETED | OUTPATIENT
Start: 2021-11-19 | End: 2021-11-19

## 2021-11-19 RX ORDER — CEFAZOLIN SODIUM 2 G/100ML
2 INJECTION, SOLUTION INTRAVENOUS ONCE
Status: COMPLETED | OUTPATIENT
Start: 2021-11-19 | End: 2021-11-19

## 2021-11-19 RX ORDER — HYDROCODONE BITARTRATE AND ACETAMINOPHEN 5; 325 MG/1; MG/1
1 TABLET ORAL ONCE AS NEEDED
Status: DISCONTINUED | OUTPATIENT
Start: 2021-11-19 | End: 2021-11-19 | Stop reason: HOSPADM

## 2021-11-19 RX ORDER — MIDAZOLAM HYDROCHLORIDE 1 MG/ML
0.5 INJECTION INTRAMUSCULAR; INTRAVENOUS
Status: COMPLETED | OUTPATIENT
Start: 2021-11-19 | End: 2021-11-19

## 2021-11-19 RX ORDER — DROPERIDOL 2.5 MG/ML
0.62 INJECTION, SOLUTION INTRAMUSCULAR; INTRAVENOUS ONCE AS NEEDED
Status: DISCONTINUED | OUTPATIENT
Start: 2021-11-19 | End: 2021-11-19 | Stop reason: HOSPADM

## 2021-11-19 RX ORDER — NALOXONE HCL 0.4 MG/ML
0.4 VIAL (ML) INJECTION AS NEEDED
Status: DISCONTINUED | OUTPATIENT
Start: 2021-11-19 | End: 2021-11-19 | Stop reason: HOSPADM

## 2021-11-19 RX ORDER — SODIUM CHLORIDE 0.9 % (FLUSH) 0.9 %
3 SYRINGE (ML) INJECTION EVERY 12 HOURS SCHEDULED
Status: DISCONTINUED | OUTPATIENT
Start: 2021-11-19 | End: 2021-11-19 | Stop reason: HOSPADM

## 2021-11-19 RX ORDER — ENALAPRILAT 2.5 MG/2ML
1.25 INJECTION INTRAVENOUS ONCE AS NEEDED
Status: DISCONTINUED | OUTPATIENT
Start: 2021-11-19 | End: 2021-11-19 | Stop reason: HOSPADM

## 2021-11-19 RX ORDER — FENTANYL CITRATE 50 UG/ML
50 INJECTION, SOLUTION INTRAMUSCULAR; INTRAVENOUS
Status: DISCONTINUED | OUTPATIENT
Start: 2021-11-19 | End: 2021-11-19 | Stop reason: HOSPADM

## 2021-11-19 RX ORDER — SODIUM CHLORIDE 0.9 % (FLUSH) 0.9 %
3-10 SYRINGE (ML) INJECTION AS NEEDED
Status: DISCONTINUED | OUTPATIENT
Start: 2021-11-19 | End: 2021-11-19 | Stop reason: HOSPADM

## 2021-11-19 RX ORDER — ONDANSETRON 2 MG/ML
INJECTION INTRAMUSCULAR; INTRAVENOUS AS NEEDED
Status: DISCONTINUED | OUTPATIENT
Start: 2021-11-19 | End: 2021-11-19 | Stop reason: SURG

## 2021-11-19 RX ORDER — MAGNESIUM HYDROXIDE 1200 MG/15ML
LIQUID ORAL AS NEEDED
Status: DISCONTINUED | OUTPATIENT
Start: 2021-11-19 | End: 2021-11-19 | Stop reason: HOSPADM

## 2021-11-19 RX ORDER — OXYCODONE HYDROCHLORIDE AND ACETAMINOPHEN 5; 325 MG/1; MG/1
1 TABLET ORAL ONCE AS NEEDED
Status: DISCONTINUED | OUTPATIENT
Start: 2021-11-19 | End: 2021-11-19 | Stop reason: HOSPADM

## 2021-11-19 RX ORDER — ROPIVACAINE HYDROCHLORIDE 5 MG/ML
INJECTION, SOLUTION EPIDURAL; INFILTRATION; PERINEURAL
Status: COMPLETED | OUTPATIENT
Start: 2021-11-19 | End: 2021-11-19

## 2021-11-19 RX ORDER — ACETAMINOPHEN 650 MG
TABLET, EXTENDED RELEASE ORAL AS NEEDED
Status: DISCONTINUED | OUTPATIENT
Start: 2021-11-19 | End: 2021-11-19 | Stop reason: HOSPADM

## 2021-11-19 RX ORDER — OXYCODONE HYDROCHLORIDE AND ACETAMINOPHEN 5; 325 MG/1; MG/1
1-2 TABLET ORAL EVERY 4 HOURS PRN
Qty: 40 TABLET | Refills: 0 | Status: SHIPPED | OUTPATIENT
Start: 2021-11-19 | End: 2021-12-23

## 2021-11-19 RX ORDER — HYDROMORPHONE HYDROCHLORIDE 1 MG/ML
0.25 INJECTION, SOLUTION INTRAMUSCULAR; INTRAVENOUS; SUBCUTANEOUS
Status: DISCONTINUED | OUTPATIENT
Start: 2021-11-19 | End: 2021-11-19 | Stop reason: HOSPADM

## 2021-11-19 RX ORDER — EPHEDRINE SULFATE 50 MG/ML
INJECTION, SOLUTION INTRAVENOUS AS NEEDED
Status: DISCONTINUED | OUTPATIENT
Start: 2021-11-19 | End: 2021-11-19 | Stop reason: SURG

## 2021-11-19 RX ORDER — LIDOCAINE HYDROCHLORIDE 20 MG/ML
INJECTION, SOLUTION INFILTRATION; PERINEURAL AS NEEDED
Status: DISCONTINUED | OUTPATIENT
Start: 2021-11-19 | End: 2021-11-19 | Stop reason: SURG

## 2021-11-19 RX ORDER — OXYCODONE AND ACETAMINOPHEN 7.5; 325 MG/1; MG/1
1 TABLET ORAL ONCE AS NEEDED
Status: DISCONTINUED | OUTPATIENT
Start: 2021-11-19 | End: 2021-11-19 | Stop reason: HOSPADM

## 2021-11-19 RX ORDER — PROPOFOL 10 MG/ML
VIAL (ML) INTRAVENOUS AS NEEDED
Status: DISCONTINUED | OUTPATIENT
Start: 2021-11-19 | End: 2021-11-19 | Stop reason: SURG

## 2021-11-19 RX ORDER — ONDANSETRON 2 MG/ML
4 INJECTION INTRAMUSCULAR; INTRAVENOUS ONCE AS NEEDED
Status: DISCONTINUED | OUTPATIENT
Start: 2021-11-19 | End: 2021-11-19 | Stop reason: HOSPADM

## 2021-11-19 RX ORDER — SODIUM CHLORIDE, SODIUM LACTATE, POTASSIUM CHLORIDE, CALCIUM CHLORIDE 600; 310; 30; 20 MG/100ML; MG/100ML; MG/100ML; MG/100ML
9 INJECTION, SOLUTION INTRAVENOUS CONTINUOUS
Status: DISCONTINUED | OUTPATIENT
Start: 2021-11-19 | End: 2021-11-19 | Stop reason: HOSPADM

## 2021-11-19 RX ORDER — FENTANYL CITRATE 50 UG/ML
INJECTION, SOLUTION INTRAMUSCULAR; INTRAVENOUS AS NEEDED
Status: DISCONTINUED | OUTPATIENT
Start: 2021-11-19 | End: 2021-11-19 | Stop reason: SURG

## 2021-11-19 RX ORDER — LIDOCAINE HYDROCHLORIDE 10 MG/ML
0.5 INJECTION, SOLUTION EPIDURAL; INFILTRATION; INTRACAUDAL; PERINEURAL ONCE AS NEEDED
Status: DISCONTINUED | OUTPATIENT
Start: 2021-11-19 | End: 2021-11-19 | Stop reason: HOSPADM

## 2021-11-19 RX ORDER — DIPHENHYDRAMINE HYDROCHLORIDE 50 MG/ML
12.5 INJECTION INTRAMUSCULAR; INTRAVENOUS
Status: DISCONTINUED | OUTPATIENT
Start: 2021-11-19 | End: 2021-11-19 | Stop reason: HOSPADM

## 2021-11-19 RX ADMIN — DEXAMETHASONE SODIUM PHOSPHATE 2 MG: 4 INJECTION, SOLUTION INTRA-ARTICULAR; INTRALESIONAL; INTRAMUSCULAR; INTRAVENOUS; SOFT TISSUE at 10:41

## 2021-11-19 RX ADMIN — FENTANYL CITRATE 25 MCG: 50 INJECTION INTRAMUSCULAR; INTRAVENOUS at 11:12

## 2021-11-19 RX ADMIN — FENTANYL CITRATE 50 MCG: 50 INJECTION INTRAMUSCULAR; INTRAVENOUS at 10:34

## 2021-11-19 RX ADMIN — DEXAMETHASONE SODIUM PHOSPHATE 8 MG: 10 INJECTION INTRAMUSCULAR; INTRAVENOUS at 11:17

## 2021-11-19 RX ADMIN — MIDAZOLAM 0.5 MG: 1 INJECTION INTRAMUSCULAR; INTRAVENOUS at 10:35

## 2021-11-19 RX ADMIN — FAMOTIDINE 20 MG: 10 INJECTION INTRAVENOUS at 10:33

## 2021-11-19 RX ADMIN — PROPOFOL 170 MG: 10 INJECTION, EMULSION INTRAVENOUS at 11:13

## 2021-11-19 RX ADMIN — LIDOCAINE HYDROCHLORIDE 80 MG: 20 INJECTION, SOLUTION INFILTRATION; PERINEURAL at 11:13

## 2021-11-19 RX ADMIN — ROPIVACAINE HYDROCHLORIDE 15 ML: 5 INJECTION, SOLUTION EPIDURAL; INFILTRATION; PERINEURAL at 10:41

## 2021-11-19 RX ADMIN — DEXAMETHASONE SODIUM PHOSPHATE 2 MG: 4 INJECTION, SOLUTION INTRAMUSCULAR; INTRAVENOUS at 10:45

## 2021-11-19 RX ADMIN — EPHEDRINE SULFATE 10 MG: 50 INJECTION INTRAVENOUS at 11:34

## 2021-11-19 RX ADMIN — MEPIVACAINE HYDROCHLORIDE 15 ML: 20 INJECTION, SOLUTION EPIDURAL; INFILTRATION at 10:45

## 2021-11-19 RX ADMIN — SODIUM CHLORIDE, POTASSIUM CHLORIDE, SODIUM LACTATE AND CALCIUM CHLORIDE 9 ML/HR: 600; 310; 30; 20 INJECTION, SOLUTION INTRAVENOUS at 10:26

## 2021-11-19 RX ADMIN — CEFAZOLIN SODIUM 2 G: 2 INJECTION, SOLUTION INTRAVENOUS at 11:08

## 2021-11-19 RX ADMIN — MIDAZOLAM 0.5 MG: 1 INJECTION INTRAMUSCULAR; INTRAVENOUS at 10:34

## 2021-11-19 RX ADMIN — EPHEDRINE SULFATE 10 MG: 50 INJECTION INTRAVENOUS at 11:17

## 2021-11-19 RX ADMIN — ONDANSETRON 4 MG: 2 INJECTION INTRAMUSCULAR; INTRAVENOUS at 11:20

## 2021-11-19 RX ADMIN — ROPIVACAINE HYDROCHLORIDE 15 ML: 5 INJECTION EPIDURAL; INFILTRATION; PERINEURAL at 10:45

## 2021-11-19 NOTE — ANESTHESIA PROCEDURE NOTES
Peripheral Block      Patient reassessed immediately prior to procedure    Patient location during procedure: pre-op  Start time: 11/19/2021 10:37 AM  Stop time: 11/19/2021 10:41 AM  Reason for block: at surgeon's request and post-op pain management  Performed by  Anesthesiologist: Cong Ibarra MD  Preanesthetic Checklist  Completed: patient identified, IV checked, site marked, risks and benefits discussed, surgical consent, monitors and equipment checked, pre-op evaluation and timeout performed  Prep:  Pt Position: supine  Sterile barriers:gloves and cap  Prep: ChloraPrep  Patient monitoring: blood pressure monitoring, continuous pulse oximetry and EKG  Procedure    Sedation: yes    Guidance:ultrasound guided    ULTRASOUND INTERPRETATION.  Using ultrasound guidance a 21 G gauge needle was placed in close proximity to the sciatic nerve, at which point, under ultrasound guidance anesthetic was injected in the area of the nerve and spread of the anesthesia was seen on ultrasound in close proximity thereto.  There were no abnormalities seen on ultrasound; a digital image was taken; and the patient tolerated the procedure with no complications. Images:still images obtained    Laterality:right  Block Type:popliteal  Injection Technique:single-shot  Needle Type:echogenic  Needle Gauge:21 G  Resistance on Injection: none    Medications Used: dexamethasone (DECADRON) injection, 2 mg  ropivacaine (NAROPIN) 0.5 % injection, 15 mL  mepivacaine PF (CARBOCAINE) 2 % injection, 15 mL  Med administered at 11/19/2021 10:41 AM      Post Assessment  Injection Assessment: negative aspiration for heme, no paresthesia on injection and incremental injection  Patient Tolerance:comfortable throughout block  Complications:no  Additional Notes  Ultrasound guidance was used to view and verify needle placement and medication disbursement.

## 2021-11-19 NOTE — ANESTHESIA POSTPROCEDURE EVALUATION
Patient: Chastity MANZO Scalf    Procedure Summary     Date: 11/19/21 Room / Location:  [facility]  /  [facility]    Anesthesia Start: 1106 Anesthesia Stop: 1231    Procedure: Right ankle mass excision (Right ) Diagnosis:       Ankle mass, right      (Ankle mass, right [R22.41])    Surgeons: Alvaro Morrison MD Provider: Cong Ibarra MD    Anesthesia Type: general ASA Status: 3          Anesthesia Type: general    Vitals  Vitals Value Taken Time   /67 11/19/21 1301   Temp 36.7 °C (98 °F) 11/19/21 1300   Pulse 70 11/19/21 1306   Resp 18 11/19/21 1300   SpO2 98 % 11/19/21 1306   Vitals shown include unvalidated device data.        Anesthesia Post Evaluation

## 2021-11-19 NOTE — ANESTHESIA PROCEDURE NOTES
Airway  Urgency: elective    Date/Time: 11/19/2021 11:15 AM  Airway not difficult (LMA placement)    General Information and Staff    Patient location during procedure: OR  Anesthesiologist: Cong Ibarra MD  CRNA: Cher Rangel CRNA    Indications and Patient Condition  Indications for airway management: airway protection    Preoxygenated: yes  MILS not maintained throughout  Mask difficulty assessment: 0 - not attempted    Final Airway Details  Final airway type: supraglottic airway      Successful airway: unique  Size 4    Number of attempts at approach: 1  Assessment: lips, teeth, and gum same as pre-op and atraumatic intubation

## 2021-11-19 NOTE — ANESTHESIA PREPROCEDURE EVALUATION
Anesthesia Evaluation     Patient summary reviewed and Nursing notes reviewed   history of anesthetic complications (postdural puncture headache): difficult airway  NPO Solid Status: > 8 hours  NPO Liquid Status: > 2 hours           Airway   Mallampati: III  TM distance: <3 FB  Neck ROM: full  Difficult intubation highly probable and Anterior  Comment: CMAC needed last intubation  Dental - normal exam     Pulmonary - negative pulmonary ROS    breath sounds clear to auscultation  Cardiovascular     ECG reviewed  Rhythm: regular    (+) valvular problems/murmurs (S/P AVR (aortic valve replacement)) AS and MR, CAD, dysrhythmias (SVT) Tachycardia, PVC, angina, hyperlipidemia,  carotid artery disease left carotid  (-) orthopnea, PND, RING    ROS comment: Sinus rhythm  Supraventricular bigeminy  When compared with ECG of 03-Mar-2018 9:02:34,  No significant change    · Calculated left ventricular EF = 62.1% Estimated left ventricular EF was in agreement with the calculated left ventricular EF. Left ventricular systolic function is normal.  · Left ventricular wall thickness is consistent with concentric hypertrophy.  · There is a bioprosthetic aortic valve present. Mild paravalvular regurgitation is present in the prosthetic aortic valve.  · Peak velocity of the flow distal to the aortic valve is 254 cm/s. Aortic valve maximum pressure gradient is 25.8 mmHg. Aortic valve mean pressure gradient is 13 mmHg. Aortic valve dimensionless index is 0.4 .  · Aortic valve area is 1.3 cm2.  · Mild tricuspid valve regurgitation is present  · Estimated right ventricular systolic pressure from tricuspid regurgitation is normal (<35 mmHg). Calculated right ventricular systolic pressure from tricuspid regurgitation is 22 mmHg.  · Compared with prior on 11/6/2019 no significant difference        Neuro/Psych  (+) headaches (Migraines),     GI/Hepatic/Renal/Endo - negative ROS     Musculoskeletal     Abdominal    Substance History - negative  use     OB/GYN negative ob/gyn ROS         Other   arthritis,                      Anesthesia Plan    ASA 3     general   (Popliteal and adductor canal block for POPC)    Anesthetic plan, all risks, benefits, and alternatives have been provided, discussed and informed consent has been obtained with: patient.

## 2021-11-19 NOTE — ANESTHESIA PROCEDURE NOTES
Peripheral Block      Patient reassessed immediately prior to procedure    Patient location during procedure: pre-op  Start time: 11/19/2021 10:42 AM  Stop time: 11/19/2021 10:45 AM  Reason for block: at surgeon's request and post-op pain management  Performed by  Anesthesiologist: Cong Ibarra MD  Preanesthetic Checklist  Completed: patient identified, IV checked, site marked, risks and benefits discussed, surgical consent, monitors and equipment checked, pre-op evaluation and timeout performed  Prep:  Pt Position: supine  Sterile barriers:gloves and cap  Prep: ChloraPrep  Patient monitoring: blood pressure monitoring, continuous pulse oximetry and EKG  Procedure    Sedation: yes    Guidance:ultrasound guided    ULTRASOUND INTERPRETATION.  Using ultrasound guidance a 21 G gauge needle was placed in close proximity to the femoral nerve, at which point, under ultrasound guidance anesthetic was injected in the area of the nerve and spread of the anesthesia was seen on ultrasound in close proximity thereto.  There were no abnormalities seen on ultrasound; a digital image was taken; and the patient tolerated the procedure with no complications. Images:still images obtained    Laterality:right  Block Type:adductor canal block  Injection Technique:single-shot  Needle Type:echogenic  Needle Gauge:21 G  Resistance on Injection: none    Medications Used: dexamethasone (DECADRON) injection, 2 mg  ropivacaine (NAROPIN) 0.5 % injection, 15 mL  mepivacaine PF (CARBOCAINE) 2 % injection, 15 mL  Med administered at 11/19/2021 10:45 AM      Post Assessment  Injection Assessment: negative aspiration for heme, no paresthesia on injection and incremental injection  Patient Tolerance:comfortable throughout block  Complications:no  Additional Notes  Ultrasound guidance was used to view and verify needle placement and medication disbursement.

## 2021-11-22 ENCOUNTER — TELEPHONE (OUTPATIENT)
Dept: ORTHOPEDIC SURGERY | Facility: CLINIC | Age: 66
End: 2021-11-22

## 2021-11-22 NOTE — TELEPHONE ENCOUNTER
I called and spoke with patient after receiving message as to how often she should ice.  States he been icing off and on about every 30 minutes.  Said her pain is well controlled now she used narcotics for about 3 doses but did likely made her feel is not is using Tylenol and does not have appreciable complaints of pain.    Reviewed with her that pathology is not yet back but did not appear to be a ganglion cyst but we will see what the pathologist determines.  Overall she seems be doing well and reviewed with her that it is doubtful that the ice is getting too much at this point with her splint on pain need to keep off this and keep it elevated to keep swelling down.  She may ice if she feels necessary from any swelling but certainly no recipe for amount of time or duration.    She appreciated the call

## 2021-11-24 RX ORDER — METOPROLOL SUCCINATE 25 MG/1
TABLET, EXTENDED RELEASE ORAL
Qty: 90 TABLET | Refills: 3 | Status: SHIPPED | OUTPATIENT
Start: 2021-11-24 | End: 2022-11-21 | Stop reason: SDUPTHER

## 2021-11-29 LAB
LAB AP CASE REPORT: NORMAL
PATH REPORT.FINAL DX SPEC: NORMAL
PATH REPORT.GROSS SPEC: NORMAL

## 2021-12-02 ENCOUNTER — OFFICE VISIT (OUTPATIENT)
Dept: ORTHOPEDIC SURGERY | Facility: CLINIC | Age: 66
End: 2021-12-02

## 2021-12-02 VITALS — RESPIRATION RATE: 20 BRPM | TEMPERATURE: 96.6 F | BODY MASS INDEX: 29.16 KG/M2 | HEIGHT: 65 IN | WEIGHT: 175 LBS

## 2021-12-02 DIAGNOSIS — R22.41 ANKLE MASS, RIGHT: Primary | ICD-10-CM

## 2021-12-02 DIAGNOSIS — L72.0 EPIDERMAL INCLUSION CYST: ICD-10-CM

## 2021-12-02 PROCEDURE — 99024 POSTOP FOLLOW-UP VISIT: CPT | Performed by: ORTHOPAEDIC SURGERY

## 2021-12-02 NOTE — PROGRESS NOTES
"Ankle Follow Up      Patient: Chastity Berrios    YOB: 1955 66 y.o. female    Chief Complaints: Ankle doing okay    History of Present Illness: Patient underwent right ankle mass excision on 11/19/2021.  She has been nonweightbearing and elevating and has really no significant complaints of pain other than a slight ache stated \"feels good\"  HPI    ROS: ankle pain, no fevers chills chest pain or shortness of breath  Past Medical History:   Diagnosis Date   • Ankle mass     RIGHT   • Aortic regurgitation     trace to mild   • Aortic valvar stenosis    • Arthralgia     both thumbs are getting stiff   • Coronary artery disease    • Diverticulitis    • Environmental allergies    • First degree AV block    • Hyperlipidemia    • IGT (impaired glucose tolerance)    • Left carotid stenosis 3/9/2018   • Migraines    • Mild concentric left ventricular hypertrophy (LVH)    • Mitral regurgitation     trivial to mild   • Paget's bone disease    • Psoriasis     ELBOWS CALOS AND LEGS CALOS   • PSVT (paroxysmal supraventricular tachycardia) (HCC)    • Pulmonic regurgitation     trace   • Spinal headache     AFTER CHILDBIRTH   • SVT (supraventricular tachycardia) (HCC)     NO RECURRENCE SINCE ABLATION       Physical Exam:   Vitals:    12/02/21 0947   Resp: 20   Temp: 96.6 °F (35.9 °C)   TempSrc: Temporal   Weight: 79.4 kg (175 lb)   Height: 165.1 cm (65\")   PainSc: 0-No pain     Well developed with normal mood.  She is with her .  Right ankle incision is healing without sign of infection or without skin breakdown or psoriatic exacerbation.  There is no apparent fluid collection and she was grossly sensate to light touch in the superficial peroneal nerve distribution and did not have pain with range of motion to the ankle.      Radiology: None performed    Pathology report reviewed from 11/19/2021 which indicates a ruptured epidermal inclusion cyst with surrounding fat necrosis and calcifications.      Assessment/Plan: " 1.  Status post right ankle mass excision consistent with epidermal inclusion cyst.    Overall she seems to be doing well.  Staples removed and Steri-Strips were applied.  She will let this get wet in the shower but understands not to immerse it and keep a clean dressing over this.  She will keep a clean dressing over this and will use the boot to gradually progress with weightbearing with a walker and then a cane over the next 2 weeks and then to just her boot.  I will see her back in 3 weeks to assess progress and determine treatment course going forward.  Answers for HPI/ROS submitted by the patient on 11/29/2021  Please describe your symptoms.: Surgery follow up  Have you had these symptoms before?: No  How long have you been having these symptoms?: 1-2 weeks  Please list any medications you are currently taking for this condition.: No opioids. Just normal list  What is the primary reason for your visit?: Other

## 2021-12-06 ENCOUNTER — TELEPHONE (OUTPATIENT)
Dept: ORTHOPEDIC SURGERY | Facility: CLINIC | Age: 66
End: 2021-12-06

## 2021-12-06 NOTE — TELEPHONE ENCOUNTER
Spoke with patient she was seen last week after mass excision of her ankle.  She instructed on using a boot but she said the pressure of it was actually more bothersome on the incision than without.  She has been going majority of the time without the boot especially around the house and is more comfortable with that.    Told she could do as such but recommended using the boot for at least a week out of the house but using a cane or walker at all times and keeping them from rubbing on the incision.  She has any problems let me know.   EMS

## 2021-12-23 ENCOUNTER — OFFICE VISIT (OUTPATIENT)
Dept: ORTHOPEDIC SURGERY | Facility: CLINIC | Age: 66
End: 2021-12-23

## 2021-12-23 VITALS — BODY MASS INDEX: 29.16 KG/M2 | WEIGHT: 175 LBS | TEMPERATURE: 97.1 F | HEIGHT: 65 IN

## 2021-12-23 DIAGNOSIS — L72.0 EPIDERMAL INCLUSION CYST: ICD-10-CM

## 2021-12-23 DIAGNOSIS — M19.079 ARTHRITIS OF FOOT: ICD-10-CM

## 2021-12-23 DIAGNOSIS — R22.41 ANKLE MASS, RIGHT: Primary | ICD-10-CM

## 2021-12-23 PROCEDURE — 99024 POSTOP FOLLOW-UP VISIT: CPT | Performed by: ORTHOPAEDIC SURGERY

## 2021-12-26 NOTE — PROGRESS NOTES
"Ankle Follow Up      Patient: Chastity Berrios    YOB: 1955 66 y.o. female    Chief Complaints: Ankle feels fine    History of Present Illness:Patient underwent right ankle mass excision on 11/19/2021.  She was seen on 12/2/2021 and pathology was consistent with epidermal inclusion cyst which was reviewed with her at that time and wound was healing well.    She was instructed on use of boot and cane and appropriate dressings and to gradually increase activities.    She called on 12/6/2021 stating that she felt like she could walk better with less pain without her boot on and subsequently weaned out of it.    She says she has no complaints of pain and overall feels like she is doing well gets only a slight occasional tingle but this is improving as well.  HPI    ROS: No ankle pain  Past Medical History:   Diagnosis Date   • Ankle mass     RIGHT   • Aortic regurgitation     trace to mild   • Aortic valvar stenosis    • Arthralgia     both thumbs are getting stiff   • Coronary artery disease    • Diverticulitis    • Environmental allergies    • First degree AV block    • Fracture, finger 7/2013   • Hyperlipidemia    • IGT (impaired glucose tolerance)    • Left carotid stenosis 3/9/2018   • Migraines    • Mild concentric left ventricular hypertrophy (LVH)    • Mitral regurgitation     trivial to mild   • Paget's bone disease    • Psoriasis     ELBOWS CALOS AND LEGS CALOS   • PSVT (paroxysmal supraventricular tachycardia) (HCC)    • Pulmonic regurgitation     trace   • Spinal headache     AFTER CHILDBIRTH   • SVT (supraventricular tachycardia) (HCC)     NO RECURRENCE SINCE ABLATION       Physical Exam:   Vitals:    12/23/21 0959   Temp: 97.1 °F (36.2 °C)   Weight: 79.4 kg (175 lb)   Height: 165.1 cm (65\")   PainSc: 0-No pain     Well developed with normal mood.  She is with her .  Right ankle incision is nicely healed now without evidence of the psoriatic changes that she had previously.  There is no " "evidence of recurrent mass and she was grossly sensate light touch in the distal lateral aspect of the hindfoot and unable to elicit any Tinel's over the incision.  She had no pain with range of motion of the ankle and good eversion strength without subluxation of the peroneal tendons.      Radiology: None performed      Assessment/Plan:   1.  Status post right ankle mass excision consistent with epidermal inclusion cyst.    Overall she seems be doing well and pleased with her progress and advance activity slowly as tolerated.    If she has any recurrent problems to let me know otherwise we will see her back as needed and she told me she thought I had \"been great\".  "

## 2021-12-27 ENCOUNTER — TRANSCRIBE ORDERS (OUTPATIENT)
Dept: ADMINISTRATIVE | Facility: HOSPITAL | Age: 66
End: 2021-12-27

## 2021-12-27 DIAGNOSIS — Z12.31 ENCOUNTER FOR SCREENING MAMMOGRAM FOR MALIGNANT NEOPLASM OF BREAST: Primary | ICD-10-CM

## 2022-01-31 ENCOUNTER — HOSPITAL ENCOUNTER (OUTPATIENT)
Dept: MAMMOGRAPHY | Facility: HOSPITAL | Age: 67
Discharge: HOME | End: 2022-01-31
Admitting: INTERNAL MEDICINE

## 2022-01-31 DIAGNOSIS — Z12.31 ENCOUNTER FOR SCREENING MAMMOGRAM FOR MALIGNANT NEOPLASM OF BREAST: ICD-10-CM

## 2022-01-31 PROCEDURE — 77067 SCR MAMMO BI INCL CAD: CPT

## 2022-01-31 PROCEDURE — 77063 BREAST TOMOSYNTHESIS BI: CPT

## 2022-04-12 ENCOUNTER — OFFICE VISIT (OUTPATIENT)
Dept: INTERNAL MEDICINE | Facility: CLINIC | Age: 67
End: 2022-04-12

## 2022-04-12 VITALS
HEIGHT: 65 IN | TEMPERATURE: 97.8 F | SYSTOLIC BLOOD PRESSURE: 140 MMHG | RESPIRATION RATE: 16 BRPM | DIASTOLIC BLOOD PRESSURE: 72 MMHG | HEART RATE: 87 BPM | BODY MASS INDEX: 29.12 KG/M2 | OXYGEN SATURATION: 98 %

## 2022-04-12 DIAGNOSIS — R21 RASH: Primary | ICD-10-CM

## 2022-04-12 PROCEDURE — 99212 OFFICE O/P EST SF 10 MIN: CPT | Performed by: INTERNAL MEDICINE

## 2022-04-12 NOTE — PROGRESS NOTES
Subjective   Chastity Berrios is a 66 y.o. female.     Chief Complaint   Patient presents with   • Rash   • Sunburn         Rash  This is a new problem. The current episode started in the past 7 days. The problem has been gradually improving since onset. The affected locations include the right arm and left arm. The rash is characterized by itchiness and redness.        The following portions of the patient's history were reviewed and updated as appropriate: allergies, current medications, past social history and problem list.    Outpatient Medications Marked as Taking for the 4/12/22 encounter (Office Visit) with Alvaro Small MD   Medication Sig Dispense Refill   • acetaminophen (TYLENOL) 325 MG tablet Take 2 tablets by mouth Every 6 (Six) Hours As Needed for Mild Pain . 30 tablet 0   • aspirin 81 MG EC tablet Take 1 tablet by mouth Daily. (Patient taking differently: Take 81 mg by mouth Daily. HOLDING FOR 7 DAYS PRIOR TO OR PER MD GUIDELINE) 60 tablet 1   • calcipotriene (DOVONOX) 0.005 % ointment Apply 1 application topically to the appropriate area as directed 2 (Two) Times a Day As Needed.     • Cholecalciferol (Vitamin D3) 1.25 MG (36038 UT) capsule Take 1 capsule by mouth Every 7 (Seven) Days. (Patient taking differently: Take 50,000 Units by mouth Every 7 (Seven) Days. SATURDAYS    HOLDING FOR OR) 13 capsule 3   • clobetasol (TEMOVATE) 0.05 % ointment Apply 1 application topically to the appropriate area as directed 2 (Two) Times a Day As Needed.     • FIBER ADULT GUMMIES PO Take 2 tablets by mouth Daily.     • fluticasone (FLONASE) 50 MCG/ACT nasal spray 2 sprays into each nostril Daily As Needed.     • loratadine (CLARITIN) 10 MG tablet Take 10 mg by mouth Daily.     • metoprolol succinate XL (TOPROL-XL) 25 MG 24 hr tablet TAKE ONE TABLET BY MOUTH DAILY 90 tablet 3   • Multiple Vitamins-Minerals (MULTIVITAMIN ADULTS PO) Take 1 tablet by mouth Daily. HOLDING FOR OR X 7 DAYS     •  nystatin-triamcinolone (MYCOLOG) 081333-9.1 UNIT/GM-% ointment Apply 1 application topically to the appropriate area as directed 2 (Two) Times a Day As Needed.     • sennosides-docusate (senna-docusate sodium) 8.6-50 MG per tablet Take 1 tablet by mouth 2 (Two) Times a Day. (Patient taking differently: Take 1 tablet by mouth Daily.) 60 tablet 0       Review of Systems   Skin: Positive for rash.       Objective   Vitals:    04/12/22 1435   BP: 140/72   Pulse: 87   Resp: 16   Temp: 97.8 °F (36.6 °C)   SpO2: 98%      Wt Readings from Last 3 Encounters:   12/23/21 79.4 kg (175 lb)   12/02/21 79.4 kg (175 lb)   11/17/21 79.8 kg (176 lb)    Body mass index is 29.12 kg/m².      Physical Exam  Constitutional:       Appearance: Normal appearance.   Skin:     General: Skin is warm and dry.      Findings: Rash present.   Neurological:      Mental Status: She is alert.           Problems Addressed this Visit    None     Visit Diagnoses     Rash    -  Primary      Diagnoses       Codes Comments    Rash    -  Primary ICD-10-CM: R21  ICD-9-CM: 782.1         Assessment/Plan   In today with a rash on the forearms.  She has had typical rashes like this in the past with sun exposure.  It may be a small area like would be in her neck or her forearms.  She will have systemic symptoms including fever and chills and this time she had some diarrhea.  Her arms are a little bumpy but otherwise normal today.  It looks like everything is resolving.  I do not think she needs treatment.  This really does appear to be sun poisoning type syndrome.  I do not think that porphyria would be an issue like this and this is not a typical cholinergic urticaria.  Right now she will be more careful in the future with suntan lotion.  For now no treatment is indicated.  Also longsleeve shirts and long pants when she is in summer/gisela environments.    The above information was reviewed again today 04/12/22.  It continues to be accurate as reflected above and  is unchanged.  History, physical and review of systems all reviewed and are unchanged.  Medications were reviewed today and continue the current dosing.    PPE today includes face mask and eye shield.             Dragon disclaimer:   Much of this encounter note is an electronic transcription/translation of spoken language to printed text. The electronic translation of spoken language may permit erroneous, or at times, nonsensical words or phrases to be inadvertently transcribed; Although I have reviewed the note for such errors, some may still exist.

## 2022-05-11 ENCOUNTER — IMMUNIZATION (OUTPATIENT)
Dept: VACCINE CLINIC | Facility: HOSPITAL | Age: 67
End: 2022-05-11

## 2022-05-11 DIAGNOSIS — Z23 NEED FOR VACCINATION: Primary | ICD-10-CM

## 2022-05-11 PROCEDURE — 91305 HC SARSCOV2 VAC 30 MCG TRS-SUCR PFIZER: CPT | Performed by: INTERNAL MEDICINE

## 2022-05-11 PROCEDURE — 0054A HC ADM SARSCV2 30MCG TRS-SUCR BOOSTER: CPT | Performed by: INTERNAL MEDICINE

## 2022-05-16 ENCOUNTER — OFFICE VISIT (OUTPATIENT)
Dept: INTERNAL MEDICINE | Facility: CLINIC | Age: 67
End: 2022-05-16

## 2022-05-16 VITALS
OXYGEN SATURATION: 94 % | TEMPERATURE: 96.3 F | BODY MASS INDEX: 29.49 KG/M2 | DIASTOLIC BLOOD PRESSURE: 80 MMHG | HEIGHT: 65 IN | HEART RATE: 63 BPM | WEIGHT: 177 LBS | SYSTOLIC BLOOD PRESSURE: 127 MMHG | RESPIRATION RATE: 16 BRPM

## 2022-05-16 DIAGNOSIS — I47.1 PAT (PAROXYSMAL ATRIAL TACHYCARDIA): ICD-10-CM

## 2022-05-16 DIAGNOSIS — Z95.2 S/P AVR (AORTIC VALVE REPLACEMENT): Primary | ICD-10-CM

## 2022-05-16 DIAGNOSIS — R73.02 IGT (IMPAIRED GLUCOSE TOLERANCE): ICD-10-CM

## 2022-05-16 PROBLEM — L72.0 EPIDERMAL INCLUSION CYST: Status: RESOLVED | Noted: 2021-12-02 | Resolved: 2022-05-16

## 2022-05-16 PROBLEM — R22.41 ANKLE MASS, RIGHT: Status: RESOLVED | Noted: 2021-07-07 | Resolved: 2022-05-16

## 2022-05-16 PROCEDURE — 99213 OFFICE O/P EST LOW 20 MIN: CPT | Performed by: INTERNAL MEDICINE

## 2022-05-16 NOTE — PROGRESS NOTES
Subjective   Chastity Berrios is a 66 y.o. female.     Chief Complaint   Patient presents with   • Hyperglycemia   • AVR   • Vitamin D Deficiency         In for recheck of aortic stenosis.  An aortic valve replacement 2/20/2018.  She is completely asymptomatic.  No angina.  No congestive heart failure.  No syncope or presyncope.  Got a porcine aVR.  No history of recent palpitations or PSVT.    Hyperglycemia  This is a chronic problem. The current episode started more than 1 year ago. The problem occurs constantly. The problem has been unchanged. Pertinent negatives include no abdominal pain, chest pain or coughing.        The following portions of the patient's history were reviewed and updated as appropriate: allergies, current medications, past social history and problem list.    Outpatient Medications Marked as Taking for the 5/16/22 encounter (Office Visit) with Alvaro Small MD   Medication Sig Dispense Refill   • acetaminophen (TYLENOL) 325 MG tablet Take 2 tablets by mouth Every 6 (Six) Hours As Needed for Mild Pain . 30 tablet 0   • aspirin 81 MG EC tablet Take 1 tablet by mouth Daily. (Patient taking differently: Take 81 mg by mouth Daily. HOLDING FOR 7 DAYS PRIOR TO OR PER MD GUIDELINE) 60 tablet 1   • calcipotriene (DOVONOX) 0.005 % ointment Apply 1 application topically to the appropriate area as directed 2 (Two) Times a Day As Needed.     • Cholecalciferol (Vitamin D3) 1.25 MG (63697 UT) capsule Take 1 capsule by mouth Every 7 (Seven) Days. (Patient taking differently: Take 50,000 Units by mouth Every 7 (Seven) Days. SATURDAYS    HOLDING FOR OR) 13 capsule 3   • clobetasol (TEMOVATE) 0.05 % ointment Apply 1 application topically to the appropriate area as directed 2 (Two) Times a Day As Needed.     • FIBER ADULT GUMMIES PO Take 2 tablets by mouth Daily.     • fluticasone (FLONASE) 50 MCG/ACT nasal spray 2 sprays into each nostril Daily As Needed.     • loratadine (CLARITIN) 10 MG tablet Take 10 mg by  mouth Daily.     • metoprolol succinate XL (TOPROL-XL) 25 MG 24 hr tablet TAKE ONE TABLET BY MOUTH DAILY 90 tablet 3   • Multiple Vitamins-Minerals (MULTIVITAMIN ADULTS PO) Take 1 tablet by mouth Daily. HOLDING FOR OR X 7 DAYS     • nystatin-triamcinolone (MYCOLOG) 466234-0.1 UNIT/GM-% ointment Apply 1 application topically to the appropriate area as directed 2 (Two) Times a Day As Needed.     • sennosides-docusate (senna-docusate sodium) 8.6-50 MG per tablet Take 1 tablet by mouth 2 (Two) Times a Day. (Patient taking differently: Take 1 tablet by mouth Daily.) 60 tablet 0       Review of Systems   Respiratory: Negative for cough, shortness of breath and wheezing.    Cardiovascular: Negative for chest pain, palpitations and leg swelling.   Gastrointestinal: Negative for abdominal pain, constipation and diarrhea.       Objective   Vitals:    05/16/22 1304   BP: 127/80   Pulse: 63   Resp: 16   Temp: 96.3 °F (35.7 °C)   SpO2: 94%          05/16/22  1304   Weight: 80.3 kg (177 lb)    [unfilled]  Body mass index is 29.45 kg/m².      Physical Exam   Constitutional: She appears well-developed.   Neck: No thyromegaly present.   Cardiovascular: Normal rate and regular rhythm. Exam reveals no gallop.   Murmur ( ) heard.   Crescendo decrescendo systolic murmur is present with a grade of 2/6.  Short grade 2/6 FLOR at the aortic area, grade 2 diastolic blow aortic area   Pulmonary/Chest: Effort normal and breath sounds normal. No respiratory distress. She has no wheezes. She has no rales.   Abdominal: Soft. Normal appearance and bowel sounds are normal. She exhibits no mass. There is no abdominal tenderness. There is no guarding.   Neurological: She is alert. Abnormal coordination:          Problems Addressed this Visit        Cardiac and Vasculature    PAT (paroxysmal atrial tachycardia) (HCC)    S/P AVR (aortic valve replacement) - Primary       Endocrine and Metabolic    IGT (impaired glucose tolerance)      Diagnoses        Codes Comments    S/P AVR (aortic valve replacement)    -  Primary ICD-10-CM: Z95.2  ICD-9-CM: V43.3     PAT (paroxysmal atrial tachycardia) (HCC)     ICD-10-CM: I47.1  ICD-9-CM: 427.0     IGT (impaired glucose tolerance)     ICD-10-CM: R73.02  ICD-9-CM: 790.22         Assessment & Plan   In for recheck of AVR and IGT.  History of SVT.  History of Paget's disease.  Overall health is doing very well.  She's done great since he aVR.  No cardiac symptoms.  She got annual lab work November 2021 with CBC, CMP, lipids, A1c. She had actually no evidence of CAD.  Her lab work is reviewed today.  It is excellent other than developing iron deficiency anemia.  She is a regular blood donor however.  Advised to take some daily iron to build her blood up quicker.  Unfortunately she does have problem with constipation on iron.  That is going to present a problem for her.  The other option would be to give blood less frequently.  We'll follow-up in 6 months.  Annual wellness visit November 2022.  Due for TDA P.  She is due for glucose and A1c every 6 months and will come back fasting for those.    The above information was reviewed again today 05/16/22.  It continues to be accurate as reflected above and is unchanged.  History, physical and review of systems all reviewed and are unchanged.  Medications were reviewed today and continue the current dosing.    PPE today includes face mask and eye shield.         Dragon disclaimer:   Much of this encounter note is an electronic transcription/translation of spoken language to printed text. The electronic translation of spoken language may permit erroneous, or at times, nonsensical words or phrases to be inadvertently transcribed; Although I have reviewed the note for such errors, some may still exist.

## 2022-05-18 LAB
GLUCOSE SERPL-MCNC: 95 MG/DL (ref 65–99)
HBA1C MFR BLD: 5.8 % (ref 4.8–5.6)

## 2022-06-09 NOTE — TELEPHONE ENCOUNTER
----- Message from Angelica Wooten MA sent at 12/6/2021 10:32 AM EST -----  Regarding: FW: Wearing the boot    ----- Message -----  From: Chastity Berrios  Sent: 12/6/2021  10:21 AM EST  To: [facility]  Subject: Wearing the boot                                 At my follow up appointment you said wear the boot for 2 weeks and see you in three.  I have worn the boot most of the time, since my Thursday appointment, only taking it off at night for bed , showers, and just to relax late at night.   I have found I walk better and have less pain without the boot on.      So my question is. Is the boot strictly for protection for the incision area or does it provide additional therapeutic value ?  My pain level and my walking gait are both better without the boot on.      I wore the boot when I went out to [employer] or a store but can I just walk without it in my house?     Procedure   Large Joint Arthrocentesis: L knee  Date/Time: 6/9/2022 3:59 PM  Consent given by: patient  Site marked: site marked  Timeout: Immediately prior to procedure a time out was called to verify the correct patient, procedure, equipment, support staff and site/side marked as required   Supporting Documentation  Indications: pain   Procedure Details  Location: knee - L knee  Preparation: Patient was prepped and draped in the usual sterile fashion  Needle size: 25 G  Approach: anterolateral  Medications administered: 3 mL lidocaine PF 1% 1 %; 40 mg triamcinolone acetonide 40 MG/ML  Patient tolerance: patient tolerated the procedure well with no immediate complications

## 2022-06-16 ENCOUNTER — TELEPHONE (OUTPATIENT)
Dept: INTERNAL MEDICINE | Facility: CLINIC | Age: 67
End: 2022-06-16

## 2022-06-16 ENCOUNTER — TELEMEDICINE (OUTPATIENT)
Dept: INTERNAL MEDICINE | Facility: CLINIC | Age: 67
End: 2022-06-16

## 2022-06-16 VITALS — TEMPERATURE: 99.7 F | WEIGHT: 177 LBS | BODY MASS INDEX: 29.49 KG/M2 | OXYGEN SATURATION: 97 % | HEIGHT: 65 IN

## 2022-06-16 DIAGNOSIS — U07.1 COVID-19 VIRUS INFECTION: Primary | ICD-10-CM

## 2022-06-16 PROCEDURE — 99213 OFFICE O/P EST LOW 20 MIN: CPT | Performed by: INTERNAL MEDICINE

## 2022-06-16 NOTE — PROGRESS NOTES
You have chosen to receive care through a telehealth visit.  Do you consent to use a video/audio connection for your medical care today? yes

## 2022-06-16 NOTE — TELEPHONE ENCOUNTER
Set patient up for a video visit today.      ----- Message from Sophia Sanches sent at 6/16/2022 12:09 PM EDT -----  Regarding: FW: Covid.     ----- Message -----  From: Chastity Berrios  Sent: 6/16/2022   8:07 AM EDT  To: [facility]  Subject: Covid.                                           We just got back from a cruise.  We have both tested positive for Covid.  His started the beginning of this week and he seems to be better today.  My cough started on Tuesday and got worse yesterday. My temperature is running 99.7-100.3.  And my oxygen level is around 97. Is it too late to start the medicine for Covid.  What should I do next?

## 2022-06-16 NOTE — PROGRESS NOTES
Subjective   Chastity Berrios is a 66 y.o. female.     Chief Complaint   Patient presents with   • covid positive   • Headache   • Cough   • Pain   • Fever         Video visit today due to COVID pandemic.  3 days ago she began with headache and some head congestion slight sore throat.  Cough.  No shortness of breath.  O2 levels are normal.  She tested positive for COVID yesterday.       The following portions of the patient's history were reviewed and updated as appropriate: allergies, current medications, past social history and problem list.    Outpatient Medications Marked as Taking for the 6/16/22 encounter (Telemedicine) with Alvaro Small MD   Medication Sig Dispense Refill   • acetaminophen (TYLENOL) 325 MG tablet Take 2 tablets by mouth Every 6 (Six) Hours As Needed for Mild Pain . 30 tablet 0   • aspirin 81 MG EC tablet Take 1 tablet by mouth Daily. (Patient taking differently: Take 81 mg by mouth Daily. HOLDING FOR 7 DAYS PRIOR TO OR PER MD GUIDELINE) 60 tablet 1   • calcipotriene (DOVONOX) 0.005 % ointment Apply 1 application topically to the appropriate area as directed 2 (Two) Times a Day As Needed.     • Cholecalciferol (Vitamin D3) 1.25 MG (86757 UT) capsule Take 1 capsule by mouth Every 7 (Seven) Days. (Patient taking differently: Take 50,000 Units by mouth Every 7 (Seven) Days. SATURDAYS    HOLDING FOR OR) 13 capsule 3   • clobetasol (TEMOVATE) 0.05 % ointment Apply 1 application topically to the appropriate area as directed 2 (Two) Times a Day As Needed.     • FIBER ADULT GUMMIES PO Take 2 tablets by mouth Daily.     • fluticasone (FLONASE) 50 MCG/ACT nasal spray 2 sprays into each nostril Daily As Needed.     • loratadine (CLARITIN) 10 MG tablet Take 10 mg by mouth Daily.     • metoprolol succinate XL (TOPROL-XL) 25 MG 24 hr tablet TAKE ONE TABLET BY MOUTH DAILY 90 tablet 3   • Multiple Vitamins-Minerals (MULTIVITAMIN ADULTS PO) Take 1 tablet by mouth Daily. HOLDING FOR OR X 7 DAYS     •  nystatin-triamcinolone (MYCOLOG) 922790-9.1 UNIT/GM-% ointment Apply 1 application topically to the appropriate area as directed 2 (Two) Times a Day As Needed.     • sennosides-docusate (senna-docusate sodium) 8.6-50 MG per tablet Take 1 tablet by mouth 2 (Two) Times a Day. (Patient taking differently: Take 1 tablet by mouth Daily.) 60 tablet 0       Review of Systems   Constitutional: Positive for fever. Negative for chills.   HENT: Positive for sore throat.    Respiratory: Positive for cough. Negative for shortness of breath.    Neurological: Positive for headaches.       Objective   Vitals:    06/16/22 1356   Temp: 99.7 °F (37.6 °C)   SpO2: 97%      Wt Readings from Last 3 Encounters:   06/16/22 80.3 kg (177 lb)   05/16/22 80.3 kg (177 lb)   12/23/21 79.4 kg (175 lb)    Body mass index is 29.45 kg/m².      Physical Exam  Constitutional:       Appearance: Normal appearance.   Pulmonary:      Effort: Pulmonary effort is normal.   Neurological:      Mental Status: She is alert.   Psychiatric:         Mood and Affect: Mood normal.         Behavior: Behavior normal.         Thought Content: Thought content normal.         Judgment: Judgment normal.           Problems Addressed this Visit    None     Visit Diagnoses     COVID-19 virus infection    -  Primary      Diagnoses       Codes Comments    COVID-19 virus infection    -  Primary ICD-10-CM: U07.1  ICD-9-CM: 079.89         Assessment & Plan   Video visit today due to COVID pandemic.  She developed cough and headache and slight sore throat 3 days ago.  Low-grade fever.  Tested positive for COVID yesterday.   became ill a few days prior to that and tested +3 days ago.  Clearly she has a confirmed case of COVID.  She is a candidate for Paxlovid.  We discussed the ins and outs of Paxlovid treatment and she decided against it.  She does qualify being high risk both related to age and history of valve replacement.  For now she will treat symptomatically.  Tylenol  or Advil as needed.  We discussed proper quarantine including 5 days at the end of which she will need to be fever free and having improved symptoms.  Masking continues an extra 5 days.  The good news is since she and her  are both confirmed they do not need to quarantine from each other.  [employer] platform used for the visit today.    The above information was reviewed again today 06/16/22.  It continues to be accurate as reflected above and is unchanged.  History, physical and review of systems all reviewed and are unchanged.  Medications were reviewed today and continue the current dosing.             Vonnie disclaimer:   Much of this encounter note is an electronic transcription/translation of spoken language to printed text. The electronic translation of spoken language may permit erroneous, or at times, nonsensical words or phrases to be inadvertently transcribed; Although I have reviewed the note for such errors, some may still exist.

## 2022-06-16 NOTE — TELEPHONE ENCOUNTER
Caller: Chastity Berrios    Relationship to patient: Self    Best call back number: [phone]    Date of positive COVID19 test: 6-15-22    Date if possible COVID19 exposure: WAS ON [place] LAST WEEK    COVID19 symptoms: FEVER/CHILLS, NON-PRODUCTIVE COUGH, SCRATCHY THROAT, HEADACHE    Date of initial quarantine: 6-15-22    Additional information or concerns: PATIENT AND HER  RETURNED FROM A [place] Tuesday 6-14-22. PATIENT TESTED POSITIVE YESTERDAY. PATIENT IS CONCERNED ABOUT HER FEVER INCREASING. SHE SENT A [employer] MESSAGE EARLIER THIS MORNING. PATIENT IS ASKING IF SHE SHOULD TAKE TYLENOL FOR FEVER, CURRENT TEMP 100.2 F. PATIENT ALSO WANTS TO KNOW IF SHE SHOULD BE ON AN ANTIVIRAL MEDICATION. PLEASE ADVISE.     What is the patients preferred pharmacy: [facility] AT [facility] - [phone] [facility] [phone]   [phone]

## 2022-07-19 ENCOUNTER — TELEPHONE (OUTPATIENT)
Dept: INTERNAL MEDICINE | Facility: CLINIC | Age: 67
End: 2022-07-19

## 2022-07-19 NOTE — TELEPHONE ENCOUNTER
There will be no problems taking probiotics.  They are pretty harmless.      ----- Message from Holley Maldonado MA sent at 7/19/2022 12:12 PM EDT -----  Regarding: FW: Probiotics   Please Advise      ----- Message -----  From: Chastity Berrios  Sent: 7/19/2022  11:35 AM EDT  To: [facility]  Subject: Probiotics                                       At my recent dermatology appointment it was suggested I take a probiotic as an additional option to control my psoriasis.  I am just asking if this recommendation is contrary to any of my other conditions or prescriptions.  I take the vitamin D3 weekly, use topical ointments as needed and I have recently obtained a light therapy hand wand which i haven’t started yet but plan to soon.

## 2022-08-09 RX ORDER — CHOLECALCIFEROL (VITAMIN D3) 1250 MCG
CAPSULE ORAL
Qty: 13 CAPSULE | Refills: 3 | Status: SHIPPED | OUTPATIENT
Start: 2022-08-09

## 2022-10-07 ENCOUNTER — FLU SHOT (OUTPATIENT)
Dept: INTERNAL MEDICINE | Facility: CLINIC | Age: 67
End: 2022-10-07

## 2022-10-07 ENCOUNTER — IMMUNIZATION (OUTPATIENT)
Dept: VACCINE CLINIC | Facility: HOSPITAL | Age: 67
End: 2022-10-07

## 2022-10-07 DIAGNOSIS — Z23 NEED FOR VACCINATION: Primary | ICD-10-CM

## 2022-10-07 PROCEDURE — G0008 ADMIN INFLUENZA VIRUS VAC: HCPCS | Performed by: INTERNAL MEDICINE

## 2022-10-07 PROCEDURE — 90662 IIV NO PRSV INCREASED AG IM: CPT | Performed by: INTERNAL MEDICINE

## 2022-10-07 PROCEDURE — 91312 HC SARSCOV2 VAC 30MCG/0.3ML IM BIVALENT BOOSTER 12 YRS AND OLDER: CPT | Performed by: INTERNAL MEDICINE

## 2022-10-07 PROCEDURE — 0124A: CPT | Performed by: INTERNAL MEDICINE

## 2022-11-02 DIAGNOSIS — Z79.899 MEDICATION MANAGEMENT: ICD-10-CM

## 2022-11-02 DIAGNOSIS — R73.02 IGT (IMPAIRED GLUCOSE TOLERANCE): Primary | ICD-10-CM

## 2022-11-02 DIAGNOSIS — R79.89 ABNORMAL CBC: ICD-10-CM

## 2022-11-02 DIAGNOSIS — Z00.00 ENCOUNTER FOR PREVENTIVE HEALTH EXAMINATION: ICD-10-CM

## 2022-11-16 LAB
ALBUMIN SERPL-MCNC: 4.5 G/DL (ref 3.8–4.8)
ALBUMIN/GLOB SERPL: 1.6 {RATIO} (ref 1.2–2.2)
ALP SERPL-CCNC: 113 IU/L (ref 44–121)
ALT SERPL-CCNC: 15 IU/L (ref 0–32)
AST SERPL-CCNC: 22 IU/L (ref 0–40)
BASOPHILS # BLD AUTO: 0.1 X10E3/UL (ref 0–0.2)
BASOPHILS NFR BLD AUTO: 1 %
BILIRUB SERPL-MCNC: 0.4 MG/DL (ref 0–1.2)
BUN SERPL-MCNC: 16 MG/DL (ref 8–27)
BUN/CREAT SERPL: 15 (ref 12–28)
CALCIUM SERPL-MCNC: 10 MG/DL (ref 8.7–10.3)
CHLORIDE SERPL-SCNC: 99 MMOL/L (ref 96–106)
CHOLEST SERPL-MCNC: 227 MG/DL (ref 100–199)
CHOLEST/HDLC SERPL: 4.1 RATIO (ref 0–4.4)
CO2 SERPL-SCNC: 27 MMOL/L (ref 20–29)
CREAT SERPL-MCNC: 1.08 MG/DL (ref 0.57–1)
EGFRCR SERPLBLD CKD-EPI 2021: 56 ML/MIN/1.73
EOSINOPHIL # BLD AUTO: 0.2 X10E3/UL (ref 0–0.4)
EOSINOPHIL NFR BLD AUTO: 3 %
ERYTHROCYTE [DISTWIDTH] IN BLOOD BY AUTOMATED COUNT: 14.2 % (ref 11.7–15.4)
GLOBULIN SER CALC-MCNC: 2.9 G/DL (ref 1.5–4.5)
GLUCOSE SERPL-MCNC: 95 MG/DL (ref 70–99)
HBA1C MFR BLD: 5.7 % (ref 4.8–5.6)
HCT VFR BLD AUTO: 41.7 % (ref 34–46.6)
HDLC SERPL-MCNC: 55 MG/DL
HGB BLD-MCNC: 13.5 G/DL (ref 11.1–15.9)
IMM GRANULOCYTES # BLD AUTO: 0 X10E3/UL (ref 0–0.1)
IMM GRANULOCYTES NFR BLD AUTO: 0 %
LDLC SERPL CALC-MCNC: 145 MG/DL (ref 0–99)
LYMPHOCYTES # BLD AUTO: 2.7 X10E3/UL (ref 0.7–3.1)
LYMPHOCYTES NFR BLD AUTO: 47 %
MCH RBC QN AUTO: 27.3 PG (ref 26.6–33)
MCHC RBC AUTO-ENTMCNC: 32.4 G/DL (ref 31.5–35.7)
MCV RBC AUTO: 84 FL (ref 79–97)
MONOCYTES # BLD AUTO: 0.6 X10E3/UL (ref 0.1–0.9)
MONOCYTES NFR BLD AUTO: 11 %
NEUTROPHILS # BLD AUTO: 2.2 X10E3/UL (ref 1.4–7)
NEUTROPHILS NFR BLD AUTO: 38 %
PLATELET # BLD AUTO: 187 X10E3/UL (ref 150–450)
POTASSIUM SERPL-SCNC: 5.3 MMOL/L (ref 3.5–5.2)
PROT SERPL-MCNC: 7.4 G/DL (ref 6–8.5)
RBC # BLD AUTO: 4.94 X10E6/UL (ref 3.77–5.28)
SODIUM SERPL-SCNC: 139 MMOL/L (ref 134–144)
TRIGL SERPL-MCNC: 149 MG/DL (ref 0–149)
VLDLC SERPL CALC-MCNC: 27 MG/DL (ref 5–40)
WBC # BLD AUTO: 5.8 X10E3/UL (ref 3.4–10.8)

## 2022-11-18 ENCOUNTER — OFFICE VISIT (OUTPATIENT)
Dept: INTERNAL MEDICINE | Facility: CLINIC | Age: 67
End: 2022-11-18

## 2022-11-18 VITALS
DIASTOLIC BLOOD PRESSURE: 60 MMHG | HEIGHT: 70 IN | SYSTOLIC BLOOD PRESSURE: 120 MMHG | RESPIRATION RATE: 16 BRPM | WEIGHT: 173 LBS | HEART RATE: 74 BPM | OXYGEN SATURATION: 98 % | BODY MASS INDEX: 24.77 KG/M2 | TEMPERATURE: 97.3 F

## 2022-11-18 DIAGNOSIS — Z95.2 S/P AVR (AORTIC VALVE REPLACEMENT): ICD-10-CM

## 2022-11-18 DIAGNOSIS — Z23 NEED FOR VACCINATION: ICD-10-CM

## 2022-11-18 DIAGNOSIS — Z00.00 ENCOUNTER FOR PREVENTIVE HEALTH EXAMINATION: Primary | ICD-10-CM

## 2022-11-18 DIAGNOSIS — E55.9 VITAMIN D DEFICIENCY: ICD-10-CM

## 2022-11-18 DIAGNOSIS — R73.02 IGT (IMPAIRED GLUCOSE TOLERANCE): ICD-10-CM

## 2022-11-18 PROBLEM — H93.13 TINNITUS OF BOTH EARS: Status: ACTIVE | Noted: 2022-11-18

## 2022-11-18 LAB
CLARITY, POC: CLEAR
COLOR UR: YELLOW
EXPIRATION DATE: NORMAL
GLUCOSE UR STRIP-MCNC: NEGATIVE MG/DL
Lab: NORMAL
PH UR: 6 [PH] (ref 5–8)
PROT UR STRIP-MCNC: NEGATIVE MG/DL
RBC # UR STRIP: NEGATIVE /UL
SP GR UR: 1.01 (ref 1–1.03)

## 2022-11-18 PROCEDURE — 1159F MED LIST DOCD IN RCRD: CPT | Performed by: INTERNAL MEDICINE

## 2022-11-18 PROCEDURE — 1160F RVW MEDS BY RX/DR IN RCRD: CPT | Performed by: INTERNAL MEDICINE

## 2022-11-18 PROCEDURE — 81003 URINALYSIS AUTO W/O SCOPE: CPT | Performed by: INTERNAL MEDICINE

## 2022-11-18 PROCEDURE — G0439 PPPS, SUBSEQ VISIT: HCPCS | Performed by: INTERNAL MEDICINE

## 2022-11-18 PROCEDURE — 99397 PER PM REEVAL EST PAT 65+ YR: CPT | Performed by: INTERNAL MEDICINE

## 2022-11-18 PROCEDURE — 90715 TDAP VACCINE 7 YRS/> IM: CPT | Performed by: INTERNAL MEDICINE

## 2022-11-18 PROCEDURE — 1170F FXNL STATUS ASSESSED: CPT | Performed by: INTERNAL MEDICINE

## 2022-11-18 PROCEDURE — 1126F AMNT PAIN NOTED NONE PRSNT: CPT | Performed by: INTERNAL MEDICINE

## 2022-11-18 PROCEDURE — 90471 IMMUNIZATION ADMIN: CPT | Performed by: INTERNAL MEDICINE

## 2022-11-18 NOTE — PROGRESS NOTES
The ABCs of the Annual Wellness Visit  Subsequent Medicare Wellness Visit    Chief Complaint   Patient presents with   • Annual Exam   • Medicare Wellness-subsequent      Subjective    History of Present Illness:  Chastity Berrios is a 66 y.o. female who presents for a Subsequent Medicare Wellness Visit.    The following portions of the patient's history were reviewed and   updated as appropriate: allergies, current medications, past family history, past medical history, past social history, past surgical history and problem list.    Compared to one year ago, the patient feels her physical   health is better.    Compared to one year ago, the patient feels her mental   health is better.    Recent Hospitalizations:  She was not admitted to the hospital during the last year.       Current Medical Providers:  Patient Care Team:  Alvaro Small MD as PCP - General (Internal Medicine)  Alvaro Small MD as PCP - Internal Medicine  Jose Bonner MD as Consulting Physician (Cardiology)  Fabián Morales MD as Consulting Physician (Cardiology)    Outpatient Medications Prior to Visit   Medication Sig Dispense Refill   • acetaminophen (TYLENOL) 325 MG tablet Take 2 tablets by mouth Every 6 (Six) Hours As Needed for Mild Pain . 30 tablet 0   • aspirin 81 MG EC tablet Take 1 tablet by mouth Daily. (Patient taking differently: Take 81 mg by mouth Daily. HOLDING FOR 7 DAYS PRIOR TO OR PER MD GUIDELINE) 60 tablet 1   • calcipotriene (DOVONOX) 0.005 % ointment Apply 1 application topically to the appropriate area as directed 2 (Two) Times a Day As Needed.     • Cholecalciferol (Vitamin D3) 1.25 MG (93748 UT) capsule TAKE ONE CAPSULE BY MOUTH ONCE EVERY 7 DAYS 13 capsule 3   • clobetasol (TEMOVATE) 0.05 % ointment Apply 1 application topically to the appropriate area as directed 2 (Two) Times a Day As Needed.     • FIBER ADULT GUMMIES PO Take 2 tablets by mouth Daily.     • fluticasone (FLONASE) 50 MCG/ACT nasal spray 2  sprays into each nostril Daily As Needed.     • loratadine (CLARITIN) 10 MG tablet Take 10 mg by mouth Daily.     • metoprolol succinate XL (TOPROL-XL) 25 MG 24 hr tablet TAKE ONE TABLET BY MOUTH DAILY 90 tablet 3   • Multiple Vitamins-Minerals (MULTIVITAMIN ADULTS PO) Take 1 tablet by mouth Daily. HOLDING FOR OR X 7 DAYS     • nystatin-triamcinolone (MYCOLOG) 049202-3.1 UNIT/GM-% ointment Apply 1 application topically to the appropriate area as directed 2 (Two) Times a Day As Needed.     • sennosides-docusate (senna-docusate sodium) 8.6-50 MG per tablet Take 1 tablet by mouth 2 (Two) Times a Day. (Patient taking differently: Take 1 tablet by mouth Daily.) 60 tablet 0     Facility-Administered Medications Prior to Visit   Medication Dose Route Frequency Provider Last Rate Last Admin   • Chlorhexidine Gluconate Cloth 2 % pads 1 application  1 application Topical Q12H PRN Kaylah Prakash, STEPHANIE           No opioid medication identified on active medication list. I have reviewed chart for other potential  high risk medication/s and harmful drug interactions in the elderly.          Aspirin is on active medication list. Aspirin use is indicated based on review of current medical condition/s. Pros and cons of this therapy have been discussed today. Benefits of this medication outweigh potential harm.  Patient has been encouraged to continue taking this medication.  .      Patient Active Problem List   Diagnosis   • Psoriasis   • Paget's bone disease   • Perennial allergic rhinitis   • PAT (paroxysmal atrial tachycardia) ([facility])   • Migraine without aura and without status migrainosus, not intractable   • IGT (impaired glucose tolerance)   • Osteopenia   • Vitamin D deficiency   • Left carotid stenosis   • S/P AVR (aortic valve replacement)   • Diverticulitis of large intestine with abscess without bleeding   • Arthritis of foot   • Tinnitus of both ears     Advance Care Planning  Advance Directive is not on file.  ACP  "discussion was declined by the patient. Patient has an advance directive (not in EMR), copy requested.          Objective    Vitals:    11/18/22 0959   BP: 120/60   BP Location: Left arm   Patient Position: Sitting   Cuff Size: Adult   Pulse: 74   Resp: 16   Temp: 97.3 °F (36.3 °C)   TempSrc: Temporal   SpO2: 98%   Weight: 78.5 kg (173 lb)   Height: 177 cm (69.69\")   PainSc: 0-No pain     Estimated body mass index is 25.05 kg/m² as calculated from the following:    Height as of this encounter: 177 cm (69.69\").    Weight as of this encounter: 78.5 kg (173 lb).    BMI is >= 25 and <30. (Overweight) The following options were offered after discussion;: weight loss educational material (shared in after visit summary) and exercise counseling/recommendations      Does the patient have evidence of cognitive impairment? No    Physical Exam  Lab Results   Component Value Date    CHLPL 227 (H) 11/15/2022    TRIG 149 11/15/2022    HDL 55 11/15/2022     (H) 11/15/2022    VLDL 27 11/15/2022    HGBA1C 5.7 (H) 11/15/2022            HEALTH RISK ASSESSMENT    Smoking Status:  Social History     Tobacco Use   Smoking Status Never   Smokeless Tobacco Never     Alcohol Consumption:  Social History     Substance and Sexual Activity   Alcohol Use Yes   • Alcohol/week: 1.0 standard drink   • Types: 1 Glasses of wine per week    Comment: social / caffeine use     Fall Risk Screen:    SUSANADI Fall Risk Assessment has not been completed.    Depression Screening:  PHQ-2/PHQ-9 Depression Screening 11/18/2022   Retired PHQ-9 Total Score -   Retired Total Score -   Little Interest or Pleasure in Doing Things 0-->not at all   Feeling Down, Depressed or Hopeless 0-->not at all   PHQ-9: Brief Depression Severity Measure Score 0       Health Habits and Functional and Cognitive Screening:  Functional & Cognitive Status 11/18/2022   Do you have difficulty preparing food and eating? No   Do you have difficulty bathing yourself, getting dressed or " grooming yourself? No   Do you have difficulty using the toilet? No   Do you have difficulty moving around from place to place? No   Do you have trouble with steps or getting out of a bed or a chair? No   Current Diet Well Balanced Diet   Dental Exam Up to date   Eye Exam Up to date   Exercise (times per week) 4 times per week   Current Exercises Include Cardiovascular Workout   Current Exercise Activities Include -   Do you need help using the phone?  No   Are you deaf or do you have serious difficulty hearing?  No   Do you need help with transportation? No   Do you need help shopping? No   Do you need help preparing meals?  No   Do you need help with housework?  No   Do you need help with laundry? No   Do you need help taking your medications? No   Do you need help managing money? No   Do you ever drive or ride in a car without wearing a seat belt? No   Have you felt unusual stress, anger or loneliness in the last month? No   Who do you live with? Spouse   If you need help, do you have trouble finding someone available to you? No   Have you been bothered in the last four weeks by sexual problems? No   Do you have difficulty concentrating, remembering or making decisions? No       Age-appropriate Screening Schedule:  Refer to the list below for future screening recommendations based on patient's age, sex and/or medical conditions. Orders for these recommended tests are listed in the plan section. The patient has been provided with a written plan.    Health Maintenance   Topic Date Due   • DXA SCAN  01/29/2023   • LIPID PANEL  11/15/2023   • MAMMOGRAM  01/31/2024   • TDAP/TD VACCINES (3 - Td or Tdap) 11/18/2032   • INFLUENZA VACCINE  Completed   • ZOSTER VACCINE  Completed   • PAP SMEAR  Discontinued              Assessment & Plan   CMS Preventative Services Quick Reference  Risk Factors Identified During Encounter  Dementia/Memory   Depression/Dysphoria  Fall Risk-High or Moderate  The above risks/problems have  been discussed with the patient.  Follow up actions/plans if indicated are seen below in the Assessment/Plan Section.  Pertinent information has been shared with the patient in the After Visit Summary.    Diagnoses and all orders for this visit:    1. Encounter for preventive health examination (Primary)    2. S/P AVR (aortic valve replacement)    3. Vitamin D deficiency    4. IGT (impaired glucose tolerance)  -     Hemoglobin A1c; Future  -     Glucose, Random; Future    5. Need for vaccination  -     Tdap Vaccine Greater Than or Equal To 8yo IM        Follow Up:   Return in about 6 months (around 5/18/2023) for Recheck pat, igt, avr.     An After Visit Summary and PPPS were made available to the patient.          I spent 15 minutes caring for Chastity on this date of service. This time includes time spent by me in the following activities:preparing for the visit

## 2022-11-18 NOTE — PROGRESS NOTES
Subjective   Chastiyt Berrios is a 66 y.o. female.     Chief Complaint   Patient presents with   • Annual Exam   • Medicare Wellness-subsequent         History of Present Illness  In for annual preventive exam.  Sleep is good.  Gets 7 to 7-1/2 hours per night.  Exercises 4 to 5 days/week.  Energy is good.  Diet is reasonably balanced.       The following portions of the patient's history were reviewed and updated as appropriate: allergies, current medications, past social history and problem list.    Outpatient Medications Marked as Taking for the 11/18/22 encounter (Office Visit) with Alvaro Small MD   Medication Sig Dispense Refill   • acetaminophen (TYLENOL) 325 MG tablet Take 2 tablets by mouth Every 6 (Six) Hours As Needed for Mild Pain . 30 tablet 0   • aspirin 81 MG EC tablet Take 1 tablet by mouth Daily. (Patient taking differently: Take 81 mg by mouth Daily. HOLDING FOR 7 DAYS PRIOR TO OR PER MD GUIDELINE) 60 tablet 1   • calcipotriene (DOVONOX) 0.005 % ointment Apply 1 application topically to the appropriate area as directed 2 (Two) Times a Day As Needed.     • Cholecalciferol (Vitamin D3) 1.25 MG (35818 UT) capsule TAKE ONE CAPSULE BY MOUTH ONCE EVERY 7 DAYS 13 capsule 3   • clobetasol (TEMOVATE) 0.05 % ointment Apply 1 application topically to the appropriate area as directed 2 (Two) Times a Day As Needed.     • FIBER ADULT GUMMIES PO Take 2 tablets by mouth Daily.     • fluticasone (FLONASE) 50 MCG/ACT nasal spray 2 sprays into each nostril Daily As Needed.     • loratadine (CLARITIN) 10 MG tablet Take 10 mg by mouth Daily.     • metoprolol succinate XL (TOPROL-XL) 25 MG 24 hr tablet TAKE ONE TABLET BY MOUTH DAILY 90 tablet 3   • Multiple Vitamins-Minerals (MULTIVITAMIN ADULTS PO) Take 1 tablet by mouth Daily. HOLDING FOR OR X 7 DAYS     • nystatin-triamcinolone (MYCOLOG) 625620-8.1 UNIT/GM-% ointment Apply 1 application topically to the appropriate area as directed 2 (Two) Times a Day As  Needed.     • sennosides-docusate (senna-docusate sodium) 8.6-50 MG per tablet Take 1 tablet by mouth 2 (Two) Times a Day. (Patient taking differently: Take 1 tablet by mouth Daily.) 60 tablet 0       Review of Systems   Constitutional: Negative for appetite change, chills, diaphoresis, fatigue, fever and unexpected weight change.   Respiratory: Negative for cough, chest tightness, shortness of breath and wheezing.    Cardiovascular: Negative for chest pain, palpitations and leg swelling.   Gastrointestinal: Negative for abdominal pain, anal bleeding, blood in stool, constipation, diarrhea, nausea, rectal pain and vomiting.   Endocrine: Negative for cold intolerance, heat intolerance and polyuria.   Genitourinary: Negative for difficulty urinating, dysuria, flank pain, frequency, hematuria and urgency.   Musculoskeletal: Negative for arthralgias, back pain and myalgias.   Allergic/Immunologic: Positive for environmental allergies.   Neurological: Negative for dizziness, syncope, speech difficulty, weakness, light-headedness, numbness and headaches.   Hematological: Does not bruise/bleed easily.   Psychiatric/Behavioral: Negative for agitation, confusion, dysphoric mood and sleep disturbance. The patient is not nervous/anxious.        Objective   Vitals:    11/18/22 0959   BP: 120/60   Pulse: 74   Resp: 16   Temp: 97.3 °F (36.3 °C)   SpO2: 98%      Wt Readings from Last 3 Encounters:   11/18/22 78.5 kg (173 lb)   06/16/22 80.3 kg (177 lb)   05/16/22 80.3 kg (177 lb)    Body mass index is 25.05 kg/m².      Physical Exam  Vitals and nursing note reviewed.   Constitutional:       Appearance: Normal appearance. She is well-developed.   HENT:      Right Ear: Tympanic membrane and external ear normal.      Left Ear: Tympanic membrane and external ear normal.      Nose: Nose normal.   Eyes:      Extraocular Movements: Extraocular movements intact.      Conjunctiva/sclera: Conjunctivae normal.      Pupils: Pupils are equal,  round, and reactive to light.   Neck:      Thyroid: No thyromegaly.      Vascular: No JVD.   Cardiovascular:      Rate and Rhythm: Normal rate and regular rhythm.      Heart sounds: Murmur heard.    Crescendo decrescendo systolic murmur is present with a grade of 2/6.   Diastolic murmur is present with a grade of 2/4.    No gallop.      Comments: Grade 2 FLOR at the aortic area along with a grade 2 diastolic blow at the aortic area  Pulmonary:      Effort: Pulmonary effort is normal. No respiratory distress.      Breath sounds: Normal breath sounds. No wheezing or rales.   Abdominal:      General: Bowel sounds are normal. There is no distension.      Palpations: Abdomen is soft. There is no mass.      Tenderness: There is no abdominal tenderness. There is no guarding.      Hernia: No hernia is present.   Musculoskeletal:         General: Normal range of motion.      Cervical back: Normal range of motion and neck supple.   Lymphadenopathy:      Cervical: No cervical adenopathy.   Skin:     General: Skin is warm and dry.   Neurological:      General: No focal deficit present.      Mental Status: She is alert and oriented to person, place, and time.      Cranial Nerves: No cranial nerve deficit.      Coordination: Coordination normal.      Deep Tendon Reflexes: Reflexes normal.   Psychiatric:         Mood and Affect: Mood normal.         Behavior: Behavior normal.         Thought Content: Thought content normal.         Judgment: Judgment normal.           Problems Addressed this Visit        Cardiac and Vasculature    S/P AVR (aortic valve replacement)       Endocrine and Metabolic    IGT (impaired glucose tolerance)    Vitamin D deficiency   Other Visit Diagnoses     Encounter for preventive health examination    -  Primary      Diagnoses       Codes Comments    Encounter for preventive health examination    -  Primary ICD-10-CM: Z00.00  ICD-9-CM: V70.0     S/P AVR (aortic valve replacement)     ICD-10-CM:  Z95.2  ICD-9-CM: V43.3     Vitamin D deficiency     ICD-10-CM: E55.9  ICD-9-CM: 268.9     IGT (impaired glucose tolerance)     ICD-10-CM: R73.02  ICD-9-CM: 790.22         Assessment & Plan   In for annual preventive exam and annual wellness visit today November 2022.  Overall health appears to be excellent.  Annual lab work today including CBC, CMP, lipids, A1c, UA.  That is reviewed with patient and looks pretty good today.  She has history of IGT and AVR along with PSVT.  She is working on diet and trying to lose a few pounds.  TDA P updated today.  Flu and COVID vaccines are up-to-date.  We will continue to monitor at 6-month intervals.  Glucose and A1c every 6 months.  She does have some aortic insufficiency which we have noted before and in 2020 a paravalvular leak was noted on her AVR.  That will continue to be followed by cardiology.    Prevention counseling was performed today. The counseling performed was routine health maintenance topics including BMI and exercise.    The above information was reviewed again today 11/18/22.  It continues to be accurate as reflected above and is unchanged.  History, physical and review of systems all reviewed and are unchanged.  Medications were reviewed today and continue the current dosing.    PPE today includes face mask and eye shield.           Dragon disclaimer:   Much of this encounter note is an electronic transcription/translation of spoken language to printed text. The electronic translation of spoken language may permit erroneous, or at times, nonsensical words or phrases to be inadvertently transcribed; Although I have reviewed the note for such errors, some may still exist.

## 2022-11-21 RX ORDER — METOPROLOL SUCCINATE 25 MG/1
25 TABLET, EXTENDED RELEASE ORAL DAILY
Qty: 90 TABLET | Refills: 3 | Status: SHIPPED | OUTPATIENT
Start: 2022-11-21

## 2022-12-02 ENCOUNTER — PATIENT ROUNDING (BHMG ONLY) (OUTPATIENT)
Dept: INTERNAL MEDICINE | Facility: CLINIC | Age: 67
End: 2022-12-02

## 2022-12-02 NOTE — PROGRESS NOTES
December 2, 2022    Hello, may I speak with Chastity Berrios?    My name is Yi       I am  with [facility]  [facility] [address] [address] [phone].    Before we get started may I verify your date of birth? 1955    I am calling to officially welcome you to our practice and ask about your recent visit. Is this a good time to talk? yes    Tell me about your visit with us. What things went well?  Everything was good, everyone was very nice.       We're always looking for ways to make our patients' experiences even better. Do you have recommendations on ways we may improve?  no    Overall were you satisfied with your first visit to our practice? yes       I appreciate you taking the time to speak with me today. Is there anything else I can do for you? no      Thank you, and have a great day.

## 2022-12-21 ENCOUNTER — OFFICE VISIT (OUTPATIENT)
Dept: CARDIOLOGY | Facility: CLINIC | Age: 67
End: 2022-12-21

## 2022-12-21 VITALS
BODY MASS INDEX: 23.91 KG/M2 | SYSTOLIC BLOOD PRESSURE: 148 MMHG | HEIGHT: 70 IN | HEART RATE: 68 BPM | WEIGHT: 167 LBS | DIASTOLIC BLOOD PRESSURE: 74 MMHG

## 2022-12-21 DIAGNOSIS — I47.1 PAT (PAROXYSMAL ATRIAL TACHYCARDIA): Chronic | ICD-10-CM

## 2022-12-21 DIAGNOSIS — Z95.2 S/P AVR (AORTIC VALVE REPLACEMENT): Primary | Chronic | ICD-10-CM

## 2022-12-21 PROCEDURE — 99214 OFFICE O/P EST MOD 30 MIN: CPT | Performed by: INTERNAL MEDICINE

## 2022-12-21 PROCEDURE — 93000 ELECTROCARDIOGRAM COMPLETE: CPT | Performed by: INTERNAL MEDICINE

## 2022-12-21 NOTE — PROGRESS NOTES
"      CARDIOLOGY    Jose Bonner MD    ENCOUNTER DATE:  12/21/2022    Chastity Berrios / 67 y.o. / female        CHIEF COMPLAINT / REASON FOR OFFICE VISIT     Aortic valve replacement  Paroxysmal atrial tachycardia    HISTORY OF PRESENT ILLNESS       HPI  Chastity Berrios is a 67 y.o. female who presents today for reevaluation.  Clinically patient is doing great no chest pain shortness of breath palpitations lightheadedness swelling or fatigue.  Patient's blood pressure is a little bit elevated today this is unusual it has been running just fine and is actually well documented that it has been.  She says last week and Dr. SCHNEIDER's office it ran 120 systolically and when she checks it at home it usually runs between 1 28-1 30 systolically.    The following portions of the patient's history were reviewed and updated as appropriate: allergies, current medications, past family history, past medical history, past social history, past surgical history and problem list.      VITAL SIGNS     Visit Vitals  /74 (BP Location: Left arm)   Pulse 68   Ht 177.8 cm (70\")   Wt 75.8 kg (167 lb)   BMI 23.96 kg/m²         Wt Readings from Last 3 Encounters:   12/21/22 75.8 kg (167 lb)   11/18/22 78.5 kg (173 lb)   06/16/22 80.3 kg (177 lb)     Body mass index is 23.96 kg/m².      REVIEW OF SYSTEMS   ROS        PHYSICAL EXAMINATION     Vitals reviewed.   Constitutional:       Appearance: Healthy appearance.   Pulmonary:      Effort: Pulmonary effort is normal.   Cardiovascular:      Normal rate. Regular rhythm. Normal S1. Normal S2.      Murmurs: There is a grade 1/6 midsystolic murmur at the URSB.      No gallop. No click. No rub.   Pulses:     Intact distal pulses.   Edema:     Peripheral edema absent.   Neurological:      Mental Status: Alert and oriented to person, place and time.           REVIEWED DATA       ECG 12 Lead    Date/Time: 12/21/2022 12:26 PM  Performed by: Jose Bonner MD  Authorized by: Jose Bonner MD "   Comparison: compared with previous ECG   Comparison to previous ECG: Previous ECG had PACs.  Today's ECG looks more like a sinus arrhythmia.  Rhythm: sinus rhythm    Clinical impression: normal ECG            Cardiac Procedures:  1.     Lipid Panel    Lipid Panel 11/15/22   Total Cholesterol 227 (A)   Triglycerides 149   HDL Cholesterol 55   VLDL Cholesterol 27   LDL Cholesterol  145 (A)   (A) Abnormal value                ASSESSMENT & PLAN      Diagnosis Plan   1. S/P AVR (aortic valve replacement)  Adult Transthoracic Echo Complete W/ Cont if Necessary Per Protocol      2. PAT (paroxysmal atrial tachycardia) ([facility])              SUMMARY/DISCUSSION  1. Status post aortic valve replacement.  Patient is coming up on 3 years since we last checked her echo so I am going to repeat that.  2. Atrial arrhythmias.  Patient's remained stable no issues.  She has not had atrial fibrillation.  3. Somehow it got in my note the patient coronary artery disease but her cath back in 2018 showed normal coronary arteries.  She remains asymptomatic.  4. Follow-up 1 year if echo is unchanged.        MEDICATIONS         Discharge Medications          Accurate as of December 21, 2022 12:15 PM. If you have any questions, ask your nurse or doctor.            Changes to Medications      Instructions Start Date   aspirin 81 MG EC tablet  What changed: additional instructions   81 mg, Oral, Daily      sennosides-docusate 8.6-50 MG per tablet  Commonly known as: PERICOLACE  What changed:   · how much to take  · when to take this  · additional instructions   1 tablet, Oral, 2 Times Daily         Continue These Medications      Instructions Start Date   acetaminophen 325 MG tablet  Commonly known as: TYLENOL   650 mg, Oral, Every 6 Hours PRN      calcipotriene 0.005 % ointment  Commonly known as: DOVONOX   1 application, Topical, 2 Times Daily PRN      clobetasol 0.05 % ointment  Commonly known as: TEMOVATE   1 application, Topical, 2 Times Daily  PRN      FIBER ADULT GUMMIES PO   2 tablets, Oral, Daily      fluticasone 50 MCG/ACT nasal spray  Commonly known as: FLONASE   2 sprays, Nasal, Daily PRN      loratadine 10 MG tablet  Commonly known as: CLARITIN   10 mg, Oral, Daily      metoprolol succinate XL 25 MG 24 hr tablet  Commonly known as: TOPROL-XL   25 mg, Oral, Daily      multivitamin with minerals tablet tablet   1 tablet, Oral, Daily, HOLDING FOR OR X 7 DAYS      nystatin-triamcinolone 310244-4.1 UNIT/GM-% ointment  Commonly known as: MYCOLOG   1 application, Topical, 2 Times Daily PRN      PROBIOTIC DAILY PO   Oral      Vitamin D3 1.25 MG (63830 UT) capsule   TAKE ONE CAPSULE BY MOUTH ONCE EVERY 7 DAYS                 **Dragon Disclaimer:   Much of this encounter note is an electronic transcription/translation of spoken language to printed text. The electronic translation of spoken language may permit erroneous, or at times, nonsensical words or phrases to be inadvertently transcribed. Although I have reviewed the note for such errors, some may still exist.

## 2022-12-22 ENCOUNTER — TRANSCRIBE ORDERS (OUTPATIENT)
Dept: ADMINISTRATIVE | Facility: HOSPITAL | Age: 67
End: 2022-12-22

## 2022-12-22 DIAGNOSIS — Z12.31 SCREENING MAMMOGRAM, ENCOUNTER FOR: Primary | ICD-10-CM

## 2023-02-27 ENCOUNTER — HOSPITAL ENCOUNTER (OUTPATIENT)
Dept: MAMMOGRAPHY | Facility: HOSPITAL | Age: 68
Discharge: HOME | End: 2023-02-27
Admitting: INTERNAL MEDICINE
Payer: MEDICARE

## 2023-02-27 DIAGNOSIS — Z12.31 SCREENING MAMMOGRAM, ENCOUNTER FOR: ICD-10-CM

## 2023-02-27 PROCEDURE — 77063 BREAST TOMOSYNTHESIS BI: CPT

## 2023-02-27 PROCEDURE — 77067 SCR MAMMO BI INCL CAD: CPT

## 2023-05-15 ENCOUNTER — OFFICE VISIT (OUTPATIENT)
Dept: INTERNAL MEDICINE | Facility: CLINIC | Age: 68
End: 2023-05-15
Payer: MEDICARE

## 2023-05-15 ENCOUNTER — PATIENT MESSAGE (OUTPATIENT)
Dept: INTERNAL MEDICINE | Facility: CLINIC | Age: 68
End: 2023-05-15

## 2023-05-15 VITALS
TEMPERATURE: 97.3 F | SYSTOLIC BLOOD PRESSURE: 122 MMHG | RESPIRATION RATE: 16 BRPM | DIASTOLIC BLOOD PRESSURE: 67 MMHG | OXYGEN SATURATION: 99 % | BODY MASS INDEX: 24.05 KG/M2 | WEIGHT: 168 LBS | HEIGHT: 70 IN | HEART RATE: 88 BPM

## 2023-05-15 DIAGNOSIS — R73.02 IGT (IMPAIRED GLUCOSE TOLERANCE): Chronic | ICD-10-CM

## 2023-05-15 DIAGNOSIS — I47.1 PAT (PAROXYSMAL ATRIAL TACHYCARDIA): Chronic | ICD-10-CM

## 2023-05-15 DIAGNOSIS — M21.619 BUNION: Primary | ICD-10-CM

## 2023-05-15 DIAGNOSIS — Z79.899 MEDICATION MANAGEMENT: ICD-10-CM

## 2023-05-15 DIAGNOSIS — Z95.2 S/P AVR (AORTIC VALVE REPLACEMENT): Primary | Chronic | ICD-10-CM

## 2023-05-15 NOTE — TELEPHONE ENCOUNTER
Given her cardiac issues I would not recommend a podiatrist.  If she needs an opinion from a specialist she should see Dr. Sergio Jasso.  He has an orthopedist who specializes in feet.

## 2023-05-15 NOTE — PROGRESS NOTES
Subjective   Chastity Berrios is a 67 y.o. female.     Chief Complaint   Patient presents with   • IGT   • S/P AVR (aortic valve replacement)         History of Present Illness  In for recheck of aortic stenosis.  An aortic valve replacement 2/20/2018.  She is completely asymptomatic.  No angina.  No congestive heart failure.  No syncope or presyncope.  Got a porcine aVR.  No history of recent palpitations or PSVT.  Hyperglycemia  This is a chronic problem. The current episode started more than 1 year ago. The problem occurs constantly. The problem has been unchanged. Pertinent negatives include no chest pain or coughing.        The following portions of the patient's history were reviewed and updated as appropriate: allergies, current medications, past social history and problem list.    Outpatient Medications Marked as Taking for the 5/15/23 encounter (Office Visit) with Alvaro Small MD   Medication Sig Dispense Refill   • acetaminophen (TYLENOL) 325 MG tablet Take 2 tablets by mouth Every 6 (Six) Hours As Needed for Mild Pain . 30 tablet 0   • aspirin 81 MG EC tablet Take 1 tablet by mouth Daily. (Patient taking differently: Take 1 tablet by mouth Daily. HOLDING FOR 7 DAYS PRIOR TO OR PER MD GUIDELINE) 60 tablet 1   • calcipotriene (DOVONOX) 0.005 % ointment Apply 1 application topically to the appropriate area as directed 2 (Two) Times a Day As Needed.     • Cholecalciferol (Vitamin D3) 1.25 MG (67732 UT) capsule TAKE ONE CAPSULE BY MOUTH ONCE EVERY 7 DAYS 13 capsule 3   • clobetasol (TEMOVATE) 0.05 % ointment Apply 1 application topically to the appropriate area as directed 2 (Two) Times a Day As Needed.     • FIBER ADULT GUMMIES PO Take 2 tablets by mouth Daily.     • fluticasone (FLONASE) 50 MCG/ACT nasal spray 2 sprays into the nostril(s) as directed by provider Daily As Needed.     • loratadine (CLARITIN) 10 MG tablet Take 1 tablet by mouth Daily.     • metoprolol succinate XL (TOPROL-XL) 25 MG 24 hr tablet  Take 1 tablet by mouth Daily. 90 tablet 3   • Multiple Vitamins-Minerals (MULTIVITAMIN ADULTS PO) Take 1 tablet by mouth Daily. HOLDING FOR OR X 7 DAYS     • nystatin-triamcinolone (MYCOLOG) 032786-2.1 UNIT/GM-% ointment Apply 1 application topically to the appropriate area as directed 2 (Two) Times a Day As Needed.     • Probiotic Product (PROBIOTIC DAILY PO) Take  by mouth.     • sennosides-docusate (senna-docusate sodium) 8.6-50 MG per tablet Take 1 tablet by mouth 2 (Two) Times a Day. (Patient taking differently: Take 2 tablets by mouth Daily. Takes 2 tablets daily) 60 tablet 0       Review of Systems   Respiratory: Negative for cough, shortness of breath and wheezing.    Cardiovascular: Negative for chest pain, palpitations and leg swelling.   Neurological: Negative for light-headedness.       Objective   Vitals:    05/15/23 1351   BP: 122/67   Pulse: 88   Resp: 16   Temp: 97.3 °F (36.3 °C)   SpO2: 99%          05/15/23  1351   Weight: 76.2 kg (168 lb)    [unfilled]  Body mass index is 24.11 kg/m².      Physical Exam   Constitutional: She appears well-developed.   Neck: No thyromegaly present.   Cardiovascular: Normal rate and regular rhythm. Exam reveals no gallop.   Murmur ( ) heard.   Crescendo decrescendo systolic murmur is present with a grade of 2/6.  Short grade 2/6 FLOR at the aortic area, grade 2 diastolic blow aortic area   Pulmonary/Chest: Effort normal and breath sounds normal. No respiratory distress. She has no wheezes. She has no rales.   Abdominal: Normal appearance.   Neurological: She is alert. Abnormal coordination:          Problems Addressed this Visit        Cardiac and Vasculature    PAT (paroxysmal atrial tachycardia) (HCC) (Chronic)    S/P AVR (aortic valve replacement) - Primary (Chronic)       Endocrine and Metabolic    IGT (impaired glucose tolerance) (Chronic)   Diagnoses       Codes Comments    S/P AVR (aortic valve replacement)    -  Primary ICD-10-CM: Z95.2  ICD-9-CM: V43.3      PAT (paroxysmal atrial tachycardia) (HCC)     ICD-10-CM: I47.1  ICD-9-CM: 427.0     IGT (impaired glucose tolerance)     ICD-10-CM: R73.02  ICD-9-CM: 790.22         Assessment & Plan   In for recheck of AVR, IGT and history of SVT today May 2023.  History of Paget's disease.  Overall health is doing very well.  She's done great since he aVR.  No cardiac symptoms.  She got annual lab work November 2022 with CBC, CMP, lipids, A1c. She had actually no evidence of CAD.  Gets glucose and A1c today every 6 months.  Her lab work is reviewed today.  She has a regular blood donor and hence sometimes iron deficient.  Does not tolerate oral iron however due to constipation.  We'll follow-up in 6 months.  Annual wellness visit November 2023.  She has an upcoming visit with Dr. Bnoner in December 2023 with a TTE at that time.  She does have significant AI on exam.  She has some chronic pain in the left foot great MTPJ.  There is no restriction of flexion and extension on exam today.  Likely bunion related symptoms.  Advised as well fitted shoes as possible.  She does mention a history of psoriasis but this does not appear to be psoriatic arthritis.    The above information was reviewed again today 05/15/23.  It continues to be accurate as reflected above and is unchanged.  History, physical and review of systems all reviewed and are unchanged.  Medications were reviewed today and continue the current dosing.    PPE today includes face mask and eye shield.         Dragon disclaimer:   Much of this encounter note is an electronic transcription/translation of spoken language to printed text. The electronic translation of spoken language may permit erroneous, or at times, nonsensical words or phrases to be inadvertently transcribed; Although I have reviewed the note for such errors, some may still exist.

## 2023-07-26 NOTE — TELEPHONE ENCOUNTER
I have personally reviewed the OAR[place] report for this patient. I have considered the risks of abuse, dependence, addiction and diversion. I believe that it is clinically appropriate for this patient to be prescribed this medication based on documented diagnosis.  CSA reviewed and signed today.       The patient called asking if you would suggest she get the echocardiogram done through her cardiologist or at the hospital. Please advise. Thanks!    Would recommend through the hospital.

## 2023-11-14 DIAGNOSIS — I47.19 PAT (PAROXYSMAL ATRIAL TACHYCARDIA): Primary | Chronic | ICD-10-CM

## 2023-11-20 ENCOUNTER — OFFICE VISIT (OUTPATIENT)
Dept: INTERNAL MEDICINE | Facility: CLINIC | Age: 68
End: 2023-11-20
Payer: MEDICARE

## 2023-11-20 VITALS
OXYGEN SATURATION: 99 % | RESPIRATION RATE: 18 BRPM | WEIGHT: 170 LBS | TEMPERATURE: 98.2 F | HEIGHT: 70 IN | BODY MASS INDEX: 24.34 KG/M2 | SYSTOLIC BLOOD PRESSURE: 120 MMHG | HEART RATE: 68 BPM | DIASTOLIC BLOOD PRESSURE: 62 MMHG

## 2023-11-20 DIAGNOSIS — N95.1 MENOPAUSAL AND FEMALE CLIMACTERIC STATES: ICD-10-CM

## 2023-11-20 DIAGNOSIS — I47.19 PAT (PAROXYSMAL ATRIAL TACHYCARDIA): Chronic | ICD-10-CM

## 2023-11-20 DIAGNOSIS — M85.89 OTHER SPECIFIED DISORDERS OF BONE DENSITY AND STRUCTURE, MULTIPLE SITES: ICD-10-CM

## 2023-11-20 DIAGNOSIS — Z00.00 ENCOUNTER FOR PREVENTIVE HEALTH EXAMINATION: Primary | ICD-10-CM

## 2023-11-20 DIAGNOSIS — R73.02 IGT (IMPAIRED GLUCOSE TOLERANCE): Chronic | ICD-10-CM

## 2023-11-20 DIAGNOSIS — M85.80 OSTEOPENIA, UNSPECIFIED LOCATION: ICD-10-CM

## 2023-11-20 RX ORDER — APREMILAST 30 MG/1
30 TABLET, FILM COATED ORAL DAILY
COMMUNITY

## 2023-11-20 NOTE — PROGRESS NOTES
The ABCs of the Annual Wellness Visit  Subsequent Medicare Wellness Visit    Subjective      Chastity Berrios is a 67 y.o. female who presents for a Subsequent Medicare Wellness Visit.    The following portions of the patient's history were reviewed and   updated as appropriate: allergies, current medications, past family history, past medical history, past social history, past surgical history, and problem list.    Compared to one year ago, the patient feels her physical   health is the same.    Compared to one year ago, the patient feels her mental   health is the same.    Recent Hospitalizations:  She was not admitted to the hospital during the last year.       Current Medical Providers:  Patient Care Team:  Alvaro Small MD as PCP - General (Internal Medicine)  Alvaro Small MD as PCP - Internal Medicine  Jose Bonner MD as Consulting Physician (Cardiology)  Fabián Morales MD as Consulting Physician (Cardiology)    Outpatient Medications Prior to Visit   Medication Sig Dispense Refill    acetaminophen (TYLENOL) 325 MG tablet Take 2 tablets by mouth Every 6 (Six) Hours As Needed for Mild Pain . 30 tablet 0    aspirin 81 MG EC tablet Take 1 tablet by mouth Daily. (Patient taking differently: Take 1 tablet by mouth Daily. HOLDING FOR 7 DAYS PRIOR TO OR PER MD GUIDELINE) 60 tablet 1    calcipotriene (DOVONOX) 0.005 % ointment Apply 1 application  topically to the appropriate area as directed 2 (Two) Times a Day As Needed.      cholecalciferol (D3-50) 1.25 MG (71965 UT) capsule TAKE ONE CAPSULE BY MOUTH ONCE WEEKLY 12 capsule 1    clobetasol (TEMOVATE) 0.05 % ointment Apply 1 application  topically to the appropriate area as directed 2 (Two) Times a Day As Needed.      FIBER ADULT GUMMIES PO Take 2 tablets by mouth Daily.      fluticasone (FLONASE) 50 MCG/ACT nasal spray 2 sprays into the nostril(s) as directed by provider Daily As Needed.      loratadine (CLARITIN) 10 MG tablet Take 1 tablet by mouth  Daily.      metoprolol succinate XL (TOPROL-XL) 25 MG 24 hr tablet Take 1 tablet by mouth Daily. 90 tablet 3    Multiple Vitamins-Minerals (MULTIVITAMIN ADULTS PO) Take 1 tablet by mouth Daily. HOLDING FOR OR X 7 DAYS      nystatin-triamcinolone (MYCOLOG) 291396-8.1 UNIT/GM-% ointment Apply 1 application  topically to the appropriate area as directed 2 (Two) Times a Day As Needed.      Probiotic Product (PROBIOTIC DAILY PO) Take  by mouth.      sennosides-docusate (senna-docusate sodium) 8.6-50 MG per tablet Take 1 tablet by mouth 2 (Two) Times a Day. (Patient taking differently: Take 2 tablets by mouth Daily. Takes 2 tablets daily) 60 tablet 0     Facility-Administered Medications Prior to Visit   Medication Dose Route Frequency Provider Last Rate Last Admin    Chlorhexidine Gluconate Cloth 2 % pads 1 application  1 application  Topical Q12H PRN Kaylah Prakash, STEPHANIE           No opioid medication identified on active medication list. I have reviewed chart for other potential  high risk medication/s and harmful drug interactions in the elderly.        Aspirin is on active medication list. Aspirin use is indicated based on review of current medical condition/s. Pros and cons of this therapy have been discussed today. Benefits of this medication outweigh potential harm.  Patient has been encouraged to continue taking this medication.  .      Patient Active Problem List   Diagnosis    Psoriasis    Paget's bone disease    Perennial allergic rhinitis    PAT (paroxysmal atrial tachycardia)    Migraine without aura and without status migrainosus, not intractable    IGT (impaired glucose tolerance)    Osteopenia    Vitamin D deficiency    Left carotid stenosis    S/P AVR (aortic valve replacement)    Diverticulitis of large intestine with abscess without bleeding    Arthritis of foot    Tinnitus of both ears     Advance Care Planning   Advance Care Planning     Advance Directive is not on file.  ACP discussion was held  "with the patient during this visit. Patient does not have an advance directive, information provided.     Objective    Vitals:    23 1446   BP: 120/62   Pulse: 68   Resp: 18   Temp: 98.2 °F (36.8 °C)   TempSrc: Temporal   SpO2: 99%   Weight: 77.1 kg (170 lb)   Height: 177.8 cm (70\")     Estimated body mass index is 24.39 kg/m² as calculated from the following:    Height as of this encounter: 177.8 cm (70\").    Weight as of this encounter: 77.1 kg (170 lb).    BMI is within normal parameters. No other follow-up for BMI required.      Does the patient have evidence of cognitive impairment?   No    Lab Results   Component Value Date    CHLPL 242 (H) 2023    TRIG 162 (H) 2023    HDL 51 2023     (H) 2023    VLDL 29 2023    HGBA1C 5.9 (H) 2023          HEALTH RISK ASSESSMENT    Smoking Status:  Social History     Tobacco Use   Smoking Status Never   Smokeless Tobacco Never     Alcohol Consumption:  Social History     Substance and Sexual Activity   Alcohol Use Yes    Alcohol/week: 1.0 standard drink of alcohol    Types: 1 Glasses of wine per week    Comment: social / caffeine use     Fall Risk Screen:    GORDON Fall Risk Assessment was completed, and patient is at LOW risk for falls.Assessment completed on:2023    Depression Screenin/20/2023     2:48 PM   PHQ-2/PHQ-9 Depression Screening   Little Interest or Pleasure in Doing Things 0-->not at all   Feeling Down, Depressed or Hopeless 0-->not at all   PHQ-9: Brief Depression Severity Measure Score 0       Health Habits and Functional and Cognitive Screenin/20/2023     2:48 PM   Functional & Cognitive Status   Do you have difficulty preparing food and eating? No   Do you have difficulty bathing yourself, getting dressed or grooming yourself? No   Do you have difficulty using the toilet? No   Do you have difficulty moving around from place to place? No   Do you have trouble with steps or getting out " of a bed or a chair? No       Age-appropriate Screening Schedule:  Refer to the list below for future screening recommendations based on patient's age, sex and/or medical conditions. Orders for these recommended tests are listed in the plan section. The patient has been provided with a written plan.    Health Maintenance   Topic Date Due    DXA SCAN  01/29/2023    ANNUAL WELLNESS VISIT  11/18/2023    COVID-19 Vaccine (7 - 2023-24 season) 11/20/2023    COLORECTAL CANCER SCREENING  04/01/2024    LIPID PANEL  11/14/2024    MAMMOGRAM  02/27/2025    TDAP/TD VACCINES (3 - Td or Tdap) 11/18/2032    INFLUENZA VACCINE  Completed    Pneumococcal Vaccine 65+  Completed    ZOSTER VACCINE  Completed    HEPATITIS C SCREENING  Discontinued    PAP SMEAR  Discontinued                  CMS Preventative Services Quick Reference  Risk Factors Identified During Encounter:    Fall Risk-High or Moderate: Information on Fall Prevention Shared in After Visit Summary    The above risks/problems have been discussed with the patient.  Pertinent information has been shared with the patient in the After Visit Summary.    Diagnoses and all orders for this visit:    1. Encounter for preventive health examination (Primary)    2. PAT (paroxysmal atrial tachycardia)    3. IGT (impaired glucose tolerance)        Follow Up:   Next Medicare Wellness visit to be scheduled in 1 year.      An After Visit Summary and PPPS were made available to the patient.

## 2023-11-20 NOTE — PROGRESS NOTES
Subjective   Chastity Berrios is a 67 y.o. female.     Chief Complaint   Patient presents with    Medicare Wellness-subsequent         History of Present Illness  In for annual preventive exam.  Sleep is good.  Gets 7 to 7-1/2 hours per night.  Exercises 3-4 days/week.  Energy is good.  Diet is reasonably balanced.       The following portions of the patient's history were reviewed and updated as appropriate: allergies, current medications, past social history and problem list.    Outpatient Medications Marked as Taking for the 11/20/23 encounter (Office Visit) with Alvaro Small MD   Medication Sig Dispense Refill    acetaminophen (TYLENOL) 325 MG tablet Take 2 tablets by mouth Every 6 (Six) Hours As Needed for Mild Pain . 30 tablet 0    aspirin 81 MG EC tablet Take 1 tablet by mouth Daily. (Patient taking differently: Take 1 tablet by mouth Daily. HOLDING FOR 7 DAYS PRIOR TO OR PER MD GUIDELINE) 60 tablet 1    calcipotriene (DOVONOX) 0.005 % ointment Apply 1 application  topically to the appropriate area as directed 2 (Two) Times a Day As Needed.      cholecalciferol (D3-50) 1.25 MG (29875 UT) capsule TAKE ONE CAPSULE BY MOUTH ONCE WEEKLY 12 capsule 1    clobetasol (TEMOVATE) 0.05 % ointment Apply 1 application  topically to the appropriate area as directed 2 (Two) Times a Day As Needed.      FIBER ADULT GUMMIES PO Take 2 tablets by mouth Daily.      fluticasone (FLONASE) 50 MCG/ACT nasal spray 2 sprays into the nostril(s) as directed by provider Daily As Needed.      loratadine (CLARITIN) 10 MG tablet Take 1 tablet by mouth Daily.      metoprolol succinate XL (TOPROL-XL) 25 MG 24 hr tablet Take 1 tablet by mouth Daily. 90 tablet 3    Multiple Vitamins-Minerals (MULTIVITAMIN ADULTS PO) Take 1 tablet by mouth Daily. HOLDING FOR OR X 7 DAYS      nystatin-triamcinolone (MYCOLOG) 335069-3.1 UNIT/GM-% ointment Apply 1 application  topically to the appropriate area as directed 2 (Two) Times a Day As Needed.       Probiotic Product (PROBIOTIC DAILY PO) Take  by mouth.      sennosides-docusate (senna-docusate sodium) 8.6-50 MG per tablet Take 1 tablet by mouth 2 (Two) Times a Day. (Patient taking differently: Take 2 tablets by mouth Daily. Takes 2 tablets daily) 60 tablet 0       Review of Systems   Constitutional:  Negative for appetite change, chills, diaphoresis, fatigue, fever and unexpected weight change.   Respiratory:  Negative for cough, chest tightness, shortness of breath and wheezing.    Cardiovascular:  Negative for chest pain, palpitations and leg swelling.   Gastrointestinal:  Positive for constipation. Negative for abdominal pain, anal bleeding, blood in stool, diarrhea, nausea, rectal pain and vomiting.   Endocrine: Negative for cold intolerance, heat intolerance and polyuria.   Genitourinary:  Negative for difficulty urinating, dysuria, flank pain, frequency, hematuria and urgency.   Musculoskeletal:  Negative for arthralgias, back pain and myalgias.   Allergic/Immunologic: Positive for environmental allergies.   Neurological:  Negative for dizziness, syncope, speech difficulty, weakness, light-headedness, numbness and headaches.   Hematological:  Does not bruise/bleed easily.   Psychiatric/Behavioral:  Negative for agitation, confusion, dysphoric mood and sleep disturbance. The patient is not nervous/anxious.        Objective   Vitals:    11/20/23 1446   BP: 120/62   Pulse: 68   Resp: 18   Temp: 98.2 °F (36.8 °C)   SpO2: 99%      Wt Readings from Last 3 Encounters:   11/20/23 77.1 kg (170 lb)   07/07/23 78 kg (172 lb)   05/15/23 76.2 kg (168 lb)    Body mass index is 24.39 kg/m².      Physical Exam  Vitals and nursing note reviewed.   Constitutional:       Appearance: Normal appearance. She is well-developed.   HENT:      Right Ear: Tympanic membrane and external ear normal.      Left Ear: Tympanic membrane and external ear normal.      Nose: Nose normal.   Eyes:      Extraocular Movements: Extraocular  movements intact.      Conjunctiva/sclera: Conjunctivae normal.      Pupils: Pupils are equal, round, and reactive to light.   Neck:      Thyroid: No thyromegaly.      Vascular: No JVD.   Cardiovascular:      Rate and Rhythm: Normal rate and regular rhythm.      Heart sounds: Murmur heard.      Crescendo decrescendo systolic murmur is present with a grade of 2/6.      Diastolic murmur is present with a grade of 2/4.      No gallop.      Comments: Grade 2 FLOR at the aortic area along with a grade 2 diastolic blow at the aortic area  Pulmonary:      Effort: Pulmonary effort is normal. No respiratory distress.      Breath sounds: Normal breath sounds. No wheezing or rales.   Abdominal:      General: Bowel sounds are normal. There is no distension.      Palpations: Abdomen is soft. There is no mass.      Tenderness: There is no abdominal tenderness. There is no guarding.      Hernia: No hernia is present.   Musculoskeletal:         General: Normal range of motion.      Cervical back: Normal range of motion and neck supple.   Lymphadenopathy:      Cervical: No cervical adenopathy.   Skin:     General: Skin is warm and dry.   Neurological:      General: No focal deficit present.      Mental Status: She is alert and oriented to person, place, and time.      Cranial Nerves: No cranial nerve deficit.      Coordination: Coordination normal.      Deep Tendon Reflexes: Reflexes normal.   Psychiatric:         Mood and Affect: Mood normal.         Behavior: Behavior normal.         Thought Content: Thought content normal.         Judgment: Judgment normal.           Problems Addressed this Visit          Cardiac and Vasculature    PAT (paroxysmal atrial tachycardia) (Chronic)       Endocrine and Metabolic    IGT (impaired glucose tolerance) (Chronic)     Other Visit Diagnoses       Encounter for preventive health examination    -  Primary          Diagnoses         Codes Comments    Encounter for preventive health examination     -  Primary ICD-10-CM: Z00.00  ICD-9-CM: V70.0     PAT (paroxysmal atrial tachycardia)     ICD-10-CM: I47.19  ICD-9-CM: 427.0     IGT (impaired glucose tolerance)     ICD-10-CM: R73.02  ICD-9-CM: 790.22           Assessment & Plan   In for annual preventive exam and annual wellness visit today November 2023.  Overall health appears to be excellent.  Annual lab work today including CBC, CMP, lipids, A1c, UA.  That is reviewed with patient and looks pretty good today.  She has history of IGT and AVR along with PSVT.  She is working on diet and trying to lose a few pounds.  We will continue to monitor at 6-month intervals.  Glucose and A1c every 6 months.  She does have some aortic insufficiency which we have noted before and in 2020 a paravalvular leak was noted on her AVR.  That will continue to be followed by cardiology.  Due for a DEXA scan.    Prevention counseling was performed today. The counseling performed was routine health maintenance topics including BMI and exercise.    The above information was reviewed again today 11/20/23.  It continues to be accurate as reflected above and is unchanged.  History, physical and review of systems all reviewed and are unchanged.  Medications were reviewed today and continue the current dosing.             Vonnie disclaimer:   Much of this encounter note is an electronic transcription/translation of spoken language to printed text. The electronic translation of spoken language may permit erroneous, or at times, nonsensical words or phrases to be inadvertently transcribed; Although I have reviewed the note for such errors, some may still exist.

## 2023-11-27 ENCOUNTER — PATIENT ROUNDING (BHMG ONLY) (OUTPATIENT)
Dept: INTERNAL MEDICINE | Facility: CLINIC | Age: 68
End: 2023-11-27
Payer: MEDICARE

## 2023-11-27 RX ORDER — METOPROLOL SUCCINATE 25 MG/1
25 TABLET, EXTENDED RELEASE ORAL DAILY
Qty: 90 TABLET | Refills: 3 | Status: SHIPPED | OUTPATIENT
Start: 2023-11-27

## 2023-11-27 NOTE — PROGRESS NOTES
November 27, 2023    Hello, may I speak with Chastity Berrios?    My name is Yi    I am  with VANI SMALL  [facility]  [address] [address] [phone].    Before we get started may I verify your date of birth? 1955    I am calling to officially welcome you to our practice and ask about your recent visit. Is this a good time to talk? yes    Tell me about your visit with us. What things went well?  My visit was great, Dr. Small is a wonderful doctor.        We're always looking for ways to make our patients' experiences even better. Do you have recommendations on ways we may improve?  no    Overall were you satisfied with your first visit to our practice? yes       I appreciate you taking the time to speak with me today. Is there anything else I can do for you? no      Thank you, and have a great day.

## 2023-12-12 ENCOUNTER — HOSPITAL ENCOUNTER (OUTPATIENT)
Dept: CARDIOLOGY | Facility: HOSPITAL | Age: 68
Discharge: HOME | End: 2023-12-12
Admitting: INTERNAL MEDICINE
Payer: MEDICARE

## 2023-12-12 VITALS
SYSTOLIC BLOOD PRESSURE: 152 MMHG | HEIGHT: 70 IN | WEIGHT: 170 LBS | OXYGEN SATURATION: 98 % | BODY MASS INDEX: 24.34 KG/M2 | DIASTOLIC BLOOD PRESSURE: 76 MMHG | HEART RATE: 76 BPM

## 2023-12-12 DIAGNOSIS — Z95.2 S/P AVR (AORTIC VALVE REPLACEMENT): Chronic | ICD-10-CM

## 2023-12-12 LAB
AORTIC ARCH: 2.6 CM
AORTIC DIMENSIONLESS INDEX: 0.5 (DI)
ASCENDING AORTA: 3.2 CM
BH CV ECHO AV AORTIC VALVE AT ACCEL TIME CALCULATED: 63 MSEC
BH CV ECHO LEFT VENTRICLE GLOBAL LONGITUDINAL STRAIN: -24.1 %
BH CV ECHO MEAS - AI P1/2T: 379.8 MSEC
BH CV ECHO MEAS - AO MAX PG: 26.9 MMHG
BH CV ECHO MEAS - AO MEAN PG: 12.2 MMHG
BH CV ECHO MEAS - AO ROOT DIAM: 2.6 CM
BH CV ECHO MEAS - AO V2 MAX: 259.4 CM/SEC
BH CV ECHO MEAS - AO V2 VTI: 56 CM
BH CV ECHO MEAS - AT: 0.06 SEC
BH CV ECHO MEAS - AVA(I,D): 1.53 CM2
BH CV ECHO MEAS - EDV(CUBED): 64 ML
BH CV ECHO MEAS - EDV(MOD-SP2): 81 ML
BH CV ECHO MEAS - EDV(MOD-SP4): 73 ML
BH CV ECHO MEAS - EF(MOD-BP): 61 %
BH CV ECHO MEAS - EF(MOD-SP2): 61.7 %
BH CV ECHO MEAS - EF(MOD-SP4): 60.3 %
BH CV ECHO MEAS - ESV(CUBED): 16.7 ML
BH CV ECHO MEAS - ESV(MOD-SP2): 31 ML
BH CV ECHO MEAS - ESV(MOD-SP4): 29 ML
BH CV ECHO MEAS - FS: 36.1 %
BH CV ECHO MEAS - IVS/LVPW: 0.92 CM
BH CV ECHO MEAS - IVSD: 1.2 CM
BH CV ECHO MEAS - LAT PEAK E' VEL: 10.6 CM/SEC
BH CV ECHO MEAS - LV DIASTOLIC VOL/BSA (35-75): 37.5 CM2
BH CV ECHO MEAS - LV MASS(C)D: 175.8 GRAMS
BH CV ECHO MEAS - LV MAX PG: 6.5 MMHG
BH CV ECHO MEAS - LV MEAN PG: 4 MMHG
BH CV ECHO MEAS - LV SYSTOLIC VOL/BSA (12-30): 14.9 CM2
BH CV ECHO MEAS - LV V1 MAX: 127 CM/SEC
BH CV ECHO MEAS - LV V1 VTI: 27.6 CM
BH CV ECHO MEAS - LVIDD: 4 CM
BH CV ECHO MEAS - LVIDS: 2.6 CM
BH CV ECHO MEAS - LVOT AREA: 3.1 CM2
BH CV ECHO MEAS - LVOT DIAM: 1.99 CM
BH CV ECHO MEAS - LVPWD: 1.3 CM
BH CV ECHO MEAS - MED PEAK E' VEL: 7.4 CM/SEC
BH CV ECHO MEAS - MR MAX PG: 135.7 MMHG
BH CV ECHO MEAS - MR MAX VEL: 582.4 CM/SEC
BH CV ECHO MEAS - MR MEAN PG: 103.4 MMHG
BH CV ECHO MEAS - MR MEAN VEL: 489.8 CM/SEC
BH CV ECHO MEAS - MR VTI: 218.6 CM
BH CV ECHO MEAS - MV A DUR: 0.19 SEC
BH CV ECHO MEAS - MV A MAX VEL: 70.3 CM/SEC
BH CV ECHO MEAS - MV DEC SLOPE: 496.5 CM/SEC2
BH CV ECHO MEAS - MV DEC TIME: 0.19 SEC
BH CV ECHO MEAS - MV E MAX VEL: 96.8 CM/SEC
BH CV ECHO MEAS - MV E/A: 1.38
BH CV ECHO MEAS - MV MAX PG: 5.2 MMHG
BH CV ECHO MEAS - MV MEAN PG: 1.84 MMHG
BH CV ECHO MEAS - MV P1/2T: 66.5 MSEC
BH CV ECHO MEAS - MV V2 VTI: 26.6 CM
BH CV ECHO MEAS - MVA(P1/2T): 3.3 CM2
BH CV ECHO MEAS - MVA(VTI): 3.2 CM2
BH CV ECHO MEAS - PA ACC TIME: 0.14 SEC
BH CV ECHO MEAS - PA V2 MAX: 93.7 CM/SEC
BH CV ECHO MEAS - PULM A REVS DUR: 0.18 SEC
BH CV ECHO MEAS - PULM A REVS VEL: 32.1 CM/SEC
BH CV ECHO MEAS - PULM DIAS VEL: 61.3 CM/SEC
BH CV ECHO MEAS - PULM S/D: 1.04
BH CV ECHO MEAS - PULM SYS VEL: 63.8 CM/SEC
BH CV ECHO MEAS - QP/QS: 0.81
BH CV ECHO MEAS - RAP SYSTOLE: 3 MMHG
BH CV ECHO MEAS - RV MAX PG: 1.63 MMHG
BH CV ECHO MEAS - RV V1 MAX: 63.8 CM/SEC
BH CV ECHO MEAS - RV V1 VTI: 16.1 CM
BH CV ECHO MEAS - RVOT DIAM: 2.34 CM
BH CV ECHO MEAS - RVSP: 26 MMHG
BH CV ECHO MEAS - SI(MOD-SP2): 25.7 ML/M2
BH CV ECHO MEAS - SI(MOD-SP4): 22.6 ML/M2
BH CV ECHO MEAS - SUP REN AO DIAM: 2.5 CM
BH CV ECHO MEAS - SV(LVOT): 85.6 ML
BH CV ECHO MEAS - SV(MOD-SP2): 50 ML
BH CV ECHO MEAS - SV(MOD-SP4): 44 ML
BH CV ECHO MEAS - SV(RVOT): 69.5 ML
BH CV ECHO MEAS - TAPSE (>1.6): 2.24 CM
BH CV ECHO MEAS - TR MAX PG: 23.4 MMHG
BH CV ECHO MEAS - TR MAX VEL: 241.9 CM/SEC
BH CV ECHO MEASUREMENTS AVERAGE E/E' RATIO: 10.76
BH CV XLRA - RV BASE: 2.46 CM
BH CV XLRA - RV LENGTH: 6.4 CM
BH CV XLRA - RV MID: 2.25 CM
BH CV XLRA - TDI S': 10 CM/SEC
LEFT ATRIUM VOLUME INDEX: 28.9 ML/M2
SINUS: 3 CM
STJ: 2.7 CM

## 2023-12-12 PROCEDURE — 93306 TTE W/DOPPLER COMPLETE: CPT | Performed by: INTERNAL MEDICINE

## 2023-12-12 PROCEDURE — 93356 MYOCRD STRAIN IMG SPCKL TRCK: CPT

## 2023-12-12 PROCEDURE — 93356 MYOCRD STRAIN IMG SPCKL TRCK: CPT | Performed by: INTERNAL MEDICINE

## 2023-12-12 PROCEDURE — 93306 TTE W/DOPPLER COMPLETE: CPT

## 2024-01-02 RX ORDER — CHOLECALCIFEROL (VITAMIN D3) 1250 MCG
50000 CAPSULE ORAL WEEKLY
Qty: 12 CAPSULE | Refills: 1 | Status: SHIPPED | OUTPATIENT
Start: 2024-01-02

## 2024-01-10 ENCOUNTER — OFFICE VISIT (OUTPATIENT)
Age: 69
End: 2024-01-10
Payer: MEDICARE

## 2024-01-10 VITALS
OXYGEN SATURATION: 98 % | SYSTOLIC BLOOD PRESSURE: 100 MMHG | DIASTOLIC BLOOD PRESSURE: 60 MMHG | HEIGHT: 70 IN | BODY MASS INDEX: 24.2 KG/M2 | HEART RATE: 64 BPM | WEIGHT: 169 LBS

## 2024-01-10 DIAGNOSIS — Z95.2 S/P AVR (AORTIC VALVE REPLACEMENT): Primary | Chronic | ICD-10-CM

## 2024-01-10 DIAGNOSIS — I47.19 PAT (PAROXYSMAL ATRIAL TACHYCARDIA): Chronic | ICD-10-CM

## 2024-01-10 RX ORDER — METOPROLOL SUCCINATE 25 MG/1
25 TABLET, EXTENDED RELEASE ORAL DAILY
Qty: 90 TABLET | Refills: 3 | Status: SHIPPED | OUTPATIENT
Start: 2024-01-10

## 2024-01-10 NOTE — PROGRESS NOTES
"    CARDIOLOGY        Patient Name: Chastity Berrios  :1955  Age: 68 y.o.  Primary Cardiologist: Jose Bonner MD  Encounter Provider:  STEPHANIE Bills    Date of Service: 01/10/24        CHIEF COMPLAINT / REASON FOR OFFICE VISIT     Follow-Up and Cardiac Valve Problem      HISTORY OF PRESENT ILLNESS       HPI  Chastity Berrios is a 68 y.o. female who presents today for annual evaluation.     Pt has a  history significant for PAT, prior AVR.    Patient originally established care with Dr. Bonner in 2018.  At that time her PCP had ordered an echocardiogram which revealed aortic valve stenosis.    In 2018 patient ultimately had a bioprosthetic aortic valve replacement.    Patient surveillance echocardiograms have revealed some mild aortic valve regurgitation but no stenosis.  Most recent echocardiogram was last year.  Patient reports that she is exercising routinely without any exertional symptoms.  She plays Piggybackr ball and participates in cardio exercises.  She is currently asymptomatic and denies chest pain, dyspnea at rest or with exertion, palpitations, lightheadedness, edema, fatigue.      The following portions of the patient's history were reviewed and updated as appropriate: allergies, current medications, past family history, past medical history, past social history, past surgical history and problem list.      VITAL SIGNS     Visit Vitals  /60 (BP Location: Right arm, Patient Position: Sitting)   Pulse 64   Ht 177.8 cm (70\")   Wt 76.7 kg (169 lb)   SpO2 98%   BMI 24.25 kg/m²         Wt Readings from Last 3 Encounters:   01/10/24 76.7 kg (169 lb)   23 77.1 kg (170 lb)   23 77.1 kg (170 lb)     Body mass index is 24.25 kg/m².      REVIEW OF SYSTEMS   Review of Systems   Constitutional: Negative for chills, fever, weight gain and weight loss.   Cardiovascular:  Negative for leg swelling.   Respiratory:  Negative for cough, snoring and wheezing.    Hematologic/Lymphatic: " Negative for bleeding problem. Does not bruise/bleed easily.   Skin:  Negative for color change.   Musculoskeletal:  Negative for falls, joint pain and myalgias.   Gastrointestinal:  Negative for melena.   Genitourinary:  Negative for hematuria.   Neurological:  Negative for excessive daytime sleepiness.   Psychiatric/Behavioral:  Negative for depression. The patient is not nervous/anxious.            PHYSICAL EXAMINATION     Vitals and nursing note reviewed.   Constitutional:       Appearance: Normal appearance. Well-developed.   Eyes:      Conjunctiva/sclera: Conjunctivae normal.   Neck:      Vascular: No carotid bruit.   Pulmonary:      Breath sounds: Normal breath sounds.   Cardiovascular:      Normal rate. Regular rhythm. Normal S1 with normal intensity. Normal S2 with normal intensity.       Murmurs: There is no murmur.      No gallop.  No click. No rub.   Edema:     Peripheral edema absent.   Musculoskeletal: Normal range of motion. Skin:     General: Skin is warm and dry.   Neurological:      Mental Status: Alert and oriented to person, place, and time.      GCS: GCS eye subscore is 4. GCS verbal subscore is 5. GCS motor subscore is 6.   Psychiatric:         Speech: Speech normal.         Behavior: Behavior normal.         Thought Content: Thought content normal.         Judgment: Judgment normal.           REVIEWED DATA       ECG 12 Lead    Date/Time: 1/10/2024 11:21 AM  Performed by: Melva Vasquez APRN    Authorized by: Melva Vasquez APRN  Comparison: compared with previous ECG from 12/21/2022  Similar to previous ECG  Rhythm: sinus rhythm  Rate: normal  BPM: 64  Conduction: 1st degree AV block  ST Segments: ST segments normal  T Waves: T waves normal  QRS axis: normal    Clinical impression: normal ECG          Cardiac Procedures:  Echocardiogram 11/28/2017.  LVEF 68%.  Moderate to severe aortic valve stenosis.  Maximum pressure gradient 56 mmHg.  EMANI 1/18/2018.  LV systolic function is normal.   LV wall thickness consistent with moderate hypertrophy.  Severe aortic valve stenosis.  Mild aortic valve regurgitation.  Cardiac catheterization 1/24/2018.  Normal coronary angiography.  Moderate to severe aortic valve stenosis.  Aortic valve replacement 2/27/2018.  Aortic valve replacement with 23 mm Medtronic Masonic ultra porcine valve.  Echocardiogram 5/17/2018.  LVEF 59%.  Mild aortic valve regurgitation.  Mean pressure gradient 10 mmHg.  Maximum pressure gradient 22.3 mmHg.  There is a 23 mm porcine bioprosthetic valve present.  Peak and mean gradients within defined limits.  Echocardiogram 11/6/2019.  LVEF 63%.  Mild aortic valve regurgitation.  23 mm porcine bioprosthetic valve present gradients within defined limits.  Echocardiogram 11/17/2020.  LVEF 62%.  LVH.  Bioprosthetic aortic valve present.  Mild paravalvular regurgitation is present to the prosthetic aortic valve.  Maximum pressure gradient 25.8 mmHg.  Mild tricuspid valve regurgitation.  Echocardiogram 12/12/2023.  LVEF 61%.  Moderate mitral valve regurgitation.  Calculated RVSP 26 mmHg.  There was a porcine bioprosthetic valve present.  No aortic valve stenosis.  Mild to moderate aortic valve regurgitation.  24-hour monitor 11/27/2023.  Abnormal monitor study.  Occasional to frequent PVCs.  5% of all beats.    Lipid Panel          11/14/2023    10:58   Lipid Panel   Total Cholesterol 242    Triglycerides 162    HDL Cholesterol 51    VLDL Cholesterol 29    LDL Cholesterol  162        Lab Results   Component Value Date     11/14/2023     11/15/2022    K 5.0 11/14/2023    K 5.3 (H) 11/15/2022     11/14/2023    CL 99 11/15/2022    CO2 25 11/14/2023    CO2 27 11/15/2022    BUN 13 11/14/2023    BUN 16 11/15/2022    CREATININE 1.06 (H) 11/14/2023    CREATININE 1.08 (H) 11/15/2022    EGFRIFNONA 49 (L) 11/05/2021    EGFRIFNONA 59 (L) 01/17/2021    EGFRIFAFRI 63 11/13/2020    EGFRIFAFRI 65 11/11/2019    GLUCOSE 95 11/14/2023    GLUCOSE 96  "05/09/2023    CALCIUM 10.3 11/14/2023    CALCIUM 10.0 11/15/2022    PROTENTOTREF 7.6 11/14/2023    PROTENTOTREF 7.4 11/15/2022    ALBUMIN 4.6 11/14/2023    ALBUMIN 4.5 11/15/2022    BILITOT 0.4 11/14/2023    BILITOT 0.4 11/15/2022    AST 19 11/14/2023    AST 22 11/15/2022    ALT 14 11/14/2023    ALT 15 11/15/2022     Lab Results   Component Value Date    WBC 6.6 11/14/2023    WBC 5.8 11/15/2022    HGB 14.1 11/14/2023    HGB 13.5 11/15/2022    HCT 42.5 11/14/2023    HCT 41.7 11/15/2022    MCV 85 11/14/2023    MCV 84 11/15/2022     11/14/2023     11/15/2022     Lab Results   Component Value Date    PROBNP 92.5 02/26/2018     No results found for: \"CKTOTAL\", \"CKMB\", \"CKMBINDEX\", \"TROPONINI\", \"TROPONINT\"  Lab Results   Component Value Date    TSH 2.490 11/05/2021             ASSESSMENT & PLAN     Diagnoses and all orders for this visit:    1. S/P AVR (aortic valve replacement) (Primary)  Assessment & Plan:  Patient currently asymptomatic  Bioprosthetic valve was seated well with no stenosis and mild aortic valve regurgitation on echocardiogram in 2023.  She is aware of needing antibiotics before any dental cleanings or procedures.      2. PAT (paroxysmal atrial tachycardia)  Assessment & Plan:  ECG reveals sinus rhythm.  No episodes of PAT or atrial fibrillation  Continue current regimen      Other orders  -     ECG 12 Lead  -     metoprolol succinate XL (TOPROL-XL) 25 MG 24 hr tablet; Take 1 tablet by mouth Daily.  Dispense: 90 tablet; Refill: 3        Return in about 1 year (around 1/10/2025) for Dr. Bonner- Routine.    Future Appointments         Provider Department Center    1/30/2024 11:00 AM [facility] DEXA 1 [facility] BONE DENSITY [facility]    5/21/2024 10:00 AM Alvaro Small MD [facility] PRIMARY [facility]    1/13/2025 11:00 AM Jose Bonner MD [facility] CARDIOLOGY [facility]                    MEDICATIONS         Discharge Medications            Accurate " as of January 10, 2024 12:50 PM. If you have any questions, ask your nurse or doctor.                Changes to Medications        Instructions Start Date   aspirin 81 MG EC tablet  What changed: additional instructions   81 mg, Oral, Daily      sennosides-docusate 8.6-50 MG per tablet  Commonly known as: PERICOLACE  What changed:   how much to take  when to take this  additional instructions   1 tablet, Oral, 2 Times Daily             Continue These Medications        Instructions Start Date   acetaminophen 325 MG tablet  Commonly known as: TYLENOL   650 mg, Oral, Every 6 Hours PRN      calcipotriene 0.005 % ointment  Commonly known as: DOVONOX   1 application , Topical, 2 Times Daily PRN      clobetasol 0.05 % ointment  Commonly known as: TEMOVATE   1 application , Topical, 2 Times Daily PRN      FIBER ADULT GUMMIES PO   2 tablets, Oral, Daily      fluticasone 50 MCG/ACT nasal spray  Commonly known as: FLONASE   2 sprays, Nasal, Daily PRN      loratadine 10 MG tablet  Commonly known as: CLARITIN   10 mg, Oral, Daily      metoprolol succinate XL 25 MG 24 hr tablet  Commonly known as: TOPROL-XL   25 mg, Oral, Daily      multivitamin with minerals tablet tablet   1 tablet, Oral, Daily, HOLDING FOR OR X 7 DAYS      nystatin-triamcinolone 947306-0.1 UNIT/GM-% ointment  Commonly known as: MYCOLOG   1 application , Topical, 2 Times Daily PRN      Otezla 30 MG tablet  Generic drug: Apremilast   30 mg, Oral, Daily, The patient is taking 1/2 of the medication        PROBIOTIC DAILY PO   Oral      Vitamin D3 1.25 MG (06921 UT) capsule   50,000 Units, Oral, Weekly                   **Dragon Disclaimer:   Much of this encounter note is an electronic transcription/translation of spoken language to printed text. The electronic translation of spoken language may permit erroneous, or at times, nonsensical words or phrases to be inadvertently transcribed. Although I have reviewed the note for such errors, some may still exist.

## 2024-01-10 NOTE — ASSESSMENT & PLAN NOTE
Date & Time: 2/6/2024, 10:58 PM  Patient: Tim Fine  Encounter Provider(s):    Bisi Lei PA-C       To Whom It May Concern:    Tim Fine was seen and treated in our department on 2/6/2024. She should not return to school until fever free and symptomatically improved for at least 24 hours .    If you have any questions or concerns, please do not hesitate to call.        _____________________________  Physician/APC Signature            ECG reveals sinus rhythm.  No episodes of PAT or atrial fibrillation  Continue current regimen

## 2024-01-10 NOTE — ASSESSMENT & PLAN NOTE
Patient currently asymptomatic  Bioprosthetic valve was seated well with no stenosis and mild aortic valve regurgitation on echocardiogram in 2023.  She is aware of needing antibiotics before any dental cleanings or procedures.

## 2024-01-26 ENCOUNTER — OFFICE VISIT (OUTPATIENT)
Dept: INTERNAL MEDICINE | Facility: CLINIC | Age: 69
End: 2024-01-26
Payer: MEDICARE

## 2024-01-26 VITALS
SYSTOLIC BLOOD PRESSURE: 134 MMHG | HEART RATE: 79 BPM | RESPIRATION RATE: 18 BRPM | OXYGEN SATURATION: 99 % | HEIGHT: 70 IN | WEIGHT: 165 LBS | BODY MASS INDEX: 23.62 KG/M2 | DIASTOLIC BLOOD PRESSURE: 80 MMHG | TEMPERATURE: 97.7 F

## 2024-01-26 DIAGNOSIS — R35.0 FREQUENT URINATION: Primary | ICD-10-CM

## 2024-01-26 LAB
EXPIRATION DATE: NORMAL
GLUCOSE UR STRIP-MCNC: NEGATIVE MG/DL
Lab: NORMAL
PH UR: 7 [PH] (ref 5–8)
PROT UR STRIP-MCNC: NEGATIVE MG/DL
RBC # UR STRIP: NEGATIVE /UL
SP GR UR: 1.01 (ref 1–1.03)

## 2024-01-26 RX ORDER — CEPHALEXIN 500 MG/1
500 CAPSULE ORAL 3 TIMES DAILY
Qty: 21 CAPSULE | Refills: 0 | Status: SHIPPED | OUTPATIENT
Start: 2024-01-26

## 2024-01-26 NOTE — PROGRESS NOTES
Subjective   Chastity Berrios is a 68 y.o. female.     Chief Complaint   Patient presents with    Difficulty Urinating    Back Pain    Fever         Difficulty Urinating   This is a new problem. The current episode started in the past 7 days. Associated symptoms include flank pain and frequency. Pertinent negatives include no chills, discharge, hematuria, hesitancy, nausea, possible pregnancy, sweats, urgency or vomiting.   Back Pain  This is a new problem. The current episode started in the past 7 days. The problem occurs intermittently. The problem has been improved since onset. The pain is present in the lumbar spine. Associated symptoms include dysuria and a fever.   Fever   This is a new problem. The current episode started in the past 7 days. Associated symptoms include urinary pain. Pertinent negatives include no coughing, nausea, sore throat or vomiting.   Dysuria   This is a new problem. The current episode started yesterday. The problem occurs 2 to 4 times per day. The problem has been coming and going. The quality of the pain is described as burning. The pain is at a severity of 3/10. The maximum temperature recorded prior to her arrival was 100 - 101 F. The fever has been present for 1 - 2 days. She is not sexually active. There is no history of pyelonephritis. Associated symptoms include flank pain and frequency. Pertinent negatives include no chills, discharge, hematuria, hesitancy, nausea, possible pregnancy, sweats, urgency or vomiting.        The following portions of the patient's history were reviewed and updated as appropriate: allergies, current medications, past social history and problem list.    Outpatient Medications Marked as Taking for the 1/26/24 encounter (Office Visit) with Alvaro Small MD   Medication Sig Dispense Refill    acetaminophen (TYLENOL) 325 MG tablet Take 2 tablets by mouth Every 6 (Six) Hours As Needed for Mild Pain . 30 tablet 0    Apremilast (Otezla) 30 MG tablet  Take 30 mg by mouth Daily. The patient is taking 1/2 of the medication      aspirin 81 MG EC tablet Take 1 tablet by mouth Daily. (Patient taking differently: Take 1 tablet by mouth Daily. HOLDING FOR 7 DAYS PRIOR TO OR PER MD GUIDELINE) 60 tablet 1    calcipotriene (DOVONOX) 0.005 % ointment Apply 1 application  topically to the appropriate area as directed 2 (Two) Times a Day As Needed.      Cholecalciferol (Vitamin D3) 1.25 MG (84647 UT) capsule TAKE ONE CAPSULE BY MOUTH ONCE WEEKLY 12 capsule 1    clobetasol (TEMOVATE) 0.05 % ointment Apply 1 application  topically to the appropriate area as directed 2 (Two) Times a Day As Needed.      FIBER ADULT GUMMIES PO Take 2 tablets by mouth Daily.      fluticasone (FLONASE) 50 MCG/ACT nasal spray 2 sprays into the nostril(s) as directed by provider Daily As Needed.      loratadine (CLARITIN) 10 MG tablet Take 1 tablet by mouth Daily.      metoprolol succinate XL (TOPROL-XL) 25 MG 24 hr tablet Take 1 tablet by mouth Daily. 90 tablet 3    Multiple Vitamins-Minerals (MULTIVITAMIN ADULTS PO) Take 1 tablet by mouth Daily. HOLDING FOR OR X 7 DAYS      nystatin-triamcinolone (MYCOLOG) 684989-3.1 UNIT/GM-% ointment Apply 1 Application topically to the appropriate area as directed 2 (Two) Times a Day As Needed.      Probiotic Product (PROBIOTIC DAILY PO) Take  by mouth.      sennosides-docusate (senna-docusate sodium) 8.6-50 MG per tablet Take 1 tablet by mouth 2 (Two) Times a Day. (Patient taking differently: Take 2 tablets by mouth Daily. Takes 2 tablets daily) 60 tablet 0       Review of Systems   Constitutional:  Positive for fever. Negative for chills.   HENT:  Positive for sinus pressure. Negative for rhinorrhea and sore throat.    Respiratory:  Negative for cough.    Gastrointestinal:  Negative for nausea and vomiting.   Genitourinary:  Positive for difficulty urinating, dysuria, flank pain and frequency. Negative for hematuria, hesitancy and urgency.   Musculoskeletal:   Positive for back pain.       Objective   Vitals:    01/26/24 1115   BP: 134/80   Pulse: 79   Resp: 18   Temp: 97.7 °F (36.5 °C)   SpO2: 99%      Wt Readings from Last 3 Encounters:   01/26/24 74.8 kg (165 lb)   01/10/24 76.7 kg (169 lb)   12/12/23 77.1 kg (170 lb)    Body mass index is 23.68 kg/m².      Physical Exam  Constitutional:       Appearance: Normal appearance. She is well-developed.   Pulmonary:      Effort: Pulmonary effort is normal.      Breath sounds: Normal breath sounds.   Abdominal:      General: Bowel sounds are normal. There is no distension.      Palpations: Abdomen is soft.      Tenderness: There is no abdominal tenderness. There is no guarding.   Neurological:      Mental Status: She is alert.           Problems Addressed this Visit    None  Visit Diagnoses       Frequent urination    -  Primary    Relevant Orders    POC Urinalysis Dipstick, Automated          Diagnoses         Codes Comments    Frequent urination    -  Primary ICD-10-CM: R35.0  ICD-9-CM: 788.41           Assessment & Plan   Began about 1 week ago with some vague left lower quadrant pain.  In the last 2 days she has developed frequency and dysuria.  Some right flank pain.  Examination is unremarkable today.  Urine is crystal clear although she was sure she had a UTI.  She has had some urinary symptoms in the past few days but they seem to have cleared up on her own.  Although her exam is normal the symptoms would be more consistent with diverticulitis than anything.  Given the fact that she has been running a fever of 102 we will go ahead and treat her with a round of Keflex 500 mg 3 times daily for 1 week.    The above information was reviewed again today 01/26/24.  It continues to be accurate as reflected above and is unchanged.  History, physical and review of systems all reviewed and are unchanged.  Medications were reviewed today and continue the current dosing.               Dragon disclaimer:   Part of this note may be  an electronic transcription/translation of spoken language to printed text using the Dragon Dictation System.

## 2024-01-30 ENCOUNTER — HOSPITAL ENCOUNTER (OUTPATIENT)
Dept: BONE DENSITY | Facility: HOSPITAL | Age: 69
Discharge: HOME | End: 2024-01-30
Admitting: INTERNAL MEDICINE
Payer: MEDICARE

## 2024-01-30 DIAGNOSIS — N95.1 MENOPAUSAL AND FEMALE CLIMACTERIC STATES: ICD-10-CM

## 2024-01-30 DIAGNOSIS — M85.80 OSTEOPENIA, UNSPECIFIED LOCATION: ICD-10-CM

## 2024-01-30 DIAGNOSIS — M85.89 OTHER SPECIFIED DISORDERS OF BONE DENSITY AND STRUCTURE, MULTIPLE SITES: ICD-10-CM

## 2024-01-30 PROCEDURE — 77080 DXA BONE DENSITY AXIAL: CPT

## 2024-02-01 ENCOUNTER — TELEPHONE (OUTPATIENT)
Dept: INTERNAL MEDICINE | Facility: CLINIC | Age: 69
End: 2024-02-01
Payer: MEDICARE

## 2024-02-01 NOTE — TELEPHONE ENCOUNTER
Received fax requesting latest lab requests for Dr Butcher. Lab report from 11/14/2023 faxed today to [phone].

## 2024-02-06 ENCOUNTER — TRANSCRIBE ORDERS (OUTPATIENT)
Dept: ADMINISTRATIVE | Facility: HOSPITAL | Age: 69
End: 2024-02-06
Payer: MEDICARE

## 2024-02-06 DIAGNOSIS — Z12.31 SCREENING MAMMOGRAM FOR BREAST CANCER: Primary | ICD-10-CM

## 2024-02-28 ENCOUNTER — HOSPITAL ENCOUNTER (OUTPATIENT)
Dept: MAMMOGRAPHY | Facility: HOSPITAL | Age: 69
Discharge: HOME | End: 2024-02-28
Admitting: INTERNAL MEDICINE
Payer: MEDICARE

## 2024-02-28 DIAGNOSIS — Z12.31 SCREENING MAMMOGRAM FOR BREAST CANCER: ICD-10-CM

## 2024-02-28 PROCEDURE — 77063 BREAST TOMOSYNTHESIS BI: CPT

## 2024-02-28 PROCEDURE — 77067 SCR MAMMO BI INCL CAD: CPT

## 2024-05-18 LAB
GLUCOSE SERPL-MCNC: 97 MG/DL (ref 70–99)
HBA1C MFR BLD: 6.1 % (ref 4.8–5.6)

## 2024-05-21 ENCOUNTER — OFFICE VISIT (OUTPATIENT)
Dept: INTERNAL MEDICINE | Facility: CLINIC | Age: 69
End: 2024-05-21
Payer: MEDICARE

## 2024-05-21 VITALS
OXYGEN SATURATION: 100 % | SYSTOLIC BLOOD PRESSURE: 124 MMHG | RESPIRATION RATE: 18 BRPM | DIASTOLIC BLOOD PRESSURE: 72 MMHG | BODY MASS INDEX: 24.05 KG/M2 | WEIGHT: 168 LBS | HEIGHT: 70 IN | HEART RATE: 78 BPM | TEMPERATURE: 98.2 F

## 2024-05-21 DIAGNOSIS — Z12.11 SCREEN FOR COLON CANCER: ICD-10-CM

## 2024-05-21 DIAGNOSIS — Z79.899 MEDICATION MANAGEMENT: ICD-10-CM

## 2024-05-21 DIAGNOSIS — M85.80 OSTEOPENIA, UNSPECIFIED LOCATION: Chronic | ICD-10-CM

## 2024-05-21 DIAGNOSIS — R73.02 IGT (IMPAIRED GLUCOSE TOLERANCE): Primary | Chronic | ICD-10-CM

## 2024-05-21 PROBLEM — I35.1 NONRHEUMATIC AORTIC VALVE INSUFFICIENCY: Status: ACTIVE | Noted: 2024-05-21

## 2024-05-21 PROCEDURE — 1125F AMNT PAIN NOTED PAIN PRSNT: CPT | Performed by: INTERNAL MEDICINE

## 2024-05-21 PROCEDURE — 1159F MED LIST DOCD IN RCRD: CPT | Performed by: INTERNAL MEDICINE

## 2024-05-21 PROCEDURE — 99214 OFFICE O/P EST MOD 30 MIN: CPT | Performed by: INTERNAL MEDICINE

## 2024-05-21 PROCEDURE — 1160F RVW MEDS BY RX/DR IN RCRD: CPT | Performed by: INTERNAL MEDICINE

## 2024-05-21 NOTE — PROGRESS NOTES
Subjective   Chastity Berrios is a 68 y.o. female.     Chief Complaint   Patient presents with    Hyperglycemia    osteopenia         History of Present Illness  In for recheck of aortic stenosis.  An aortic valve replacement 2/20/2018.  She is completely asymptomatic.  No angina.  No congestive heart failure.  No syncope or presyncope.  Got a porcine aVR.  No history of recent palpitations or PSVT.  Hyperglycemia  This is a chronic problem. The current episode started more than 1 year ago. The problem occurs constantly. The problem has been unchanged. Pertinent negatives include no chest pain or coughing.        The following portions of the patient's history were reviewed and updated as appropriate: allergies, current medications, past social history and problem list.    Outpatient Medications Marked as Taking for the 5/21/24 encounter (Office Visit) with Alvaro Small MD   Medication Sig Dispense Refill    acetaminophen (TYLENOL) 325 MG tablet Take 2 tablets by mouth Every 6 (Six) Hours As Needed for Mild Pain . 30 tablet 0    aspirin 81 MG EC tablet Take 1 tablet by mouth Daily. (Patient taking differently: Take 1 tablet by mouth Daily. HOLDING FOR 7 DAYS PRIOR TO OR PER MD GUIDELINE) 60 tablet 1    calcipotriene (DOVONOX) 0.005 % ointment Apply 1 Application topically to the appropriate area as directed 2 (Two) Times a Day As Needed.      Cholecalciferol (Vitamin D3) 1.25 MG (69673 UT) capsule TAKE ONE CAPSULE BY MOUTH ONCE WEEKLY 12 capsule 1    clobetasol (TEMOVATE) 0.05 % ointment Apply 1 Application topically to the appropriate area as directed 2 (Two) Times a Day As Needed.      fluticasone (FLONASE) 50 MCG/ACT nasal spray 2 sprays into the nostril(s) as directed by provider Daily As Needed.      loratadine (CLARITIN) 10 MG tablet Take 1 tablet by mouth Daily.      metoprolol succinate XL (TOPROL-XL) 25 MG 24 hr tablet Take 1 tablet by mouth Daily. 90 tablet 3    Multiple Vitamins-Minerals (MULTIVITAMIN  ADULTS PO) Take 1 tablet by mouth Daily. HOLDING FOR OR X 7 DAYS      nystatin-triamcinolone (MYCOLOG) 537026-4.1 UNIT/GM-% ointment Apply 1 Application topically to the appropriate area as directed 2 (Two) Times a Day As Needed.      Probiotic Product (PROBIOTIC DAILY PO) Take  by mouth.      sennosides-docusate (senna-docusate sodium) 8.6-50 MG per tablet Take 1 tablet by mouth 2 (Two) Times a Day. (Patient taking differently: Take 2 tablets by mouth Daily. Takes 2 tablets daily) 60 tablet 0       Review of Systems   Respiratory:  Negative for cough, shortness of breath and wheezing.    Cardiovascular:  Negative for chest pain, palpitations and leg swelling.   Neurological:  Negative for light-headedness.       Objective   Vitals:    05/21/24 0946   BP: 124/72   Pulse: 78   Resp: 18   Temp: 98.2 °F (36.8 °C)   SpO2: 100%          05/21/24  0946   Weight: 76.2 kg (168 lb)    [unfilled]  Body mass index is 24.11 kg/m².      Physical Exam   Constitutional: She appears well-developed.   Neck: No thyromegaly present.   Cardiovascular: Normal rate and regular rhythm. Exam reveals no gallop.   Murmur ( ) heard.  Crescendo decrescendo systolic murmur is present with a grade of 2/6.  Short grade 2/6 FLOR at the aortic area, grade 2 diastolic blow aortic area   Pulmonary/Chest: Effort normal and breath sounds normal. No respiratory distress. She has no wheezes. She has no rales.   Abdominal: Normal appearance.   Neurological: She is alert. Abnormal coordination: .         Problems Addressed this Visit          Endocrine and Metabolic    IGT (impaired glucose tolerance) - Primary (Chronic)       Musculoskeletal and Injuries    Osteopenia (Chronic)     Diagnoses         Codes Comments    IGT (impaired glucose tolerance)    -  Primary ICD-10-CM: R73.02  ICD-9-CM: 790.22     Osteopenia, unspecified location     ICD-10-CM: M85.80  ICD-9-CM: 733.90           Assessment & Plan   In for recheck of AVR, IGT and history of SVT  today May 2024.  History of Paget's disease.  Overall health is doing very well.  She's done great since he aVR.  No cardiac symptoms.  She got annual lab work November 2023 with CBC, CMP, lipids, A1c. She had actually no evidence of CAD.  Gets glucose and A1c today every 6 months.  Her lab work is reviewed today.  She has a regular blood donor and hence sometimes iron deficient.  Does not tolerate oral iron however due to constipation.  We'll follow-up in 6 months.  Annual wellness visit November 2023.  She does have significant AI on exam.  This is also demonstrated on her last TTE 2022.  Will need to follow that over time and Dr. Bonner knows about it.  She does mention a history of psoriasis but this does not appear to be psoriatic arthritis.  Currently she has been switched to Skyrizi from Otezla.  She is due for colonoscopy.  Allergies continue to be problematic despite regular use of Flonase.  Try Azelastine with Flonase nasal spray and see if she does better.  We discussed COVID-vaccine every 4 months since she is at high risk being on Skyrizi.    The above information was reviewed again today 05/21/24.  It continues to be accurate as reflected above and is unchanged.  History, physical and review of systems all reviewed and are unchanged.  Medications were reviewed today and continue the current dosing.           Vonnie disclaimer:   Much of this encounter note is an electronic transcription/translation of spoken language to printed text. The electronic translation of spoken language may permit erroneous, or at times, nonsensical words or phrases to be inadvertently transcribed; Although I have reviewed the note for such errors, some may still exist.

## 2024-06-17 RX ORDER — CHOLECALCIFEROL (VITAMIN D3) 1250 MCG
50000 CAPSULE ORAL WEEKLY
Qty: 12 CAPSULE | Refills: 1 | Status: SHIPPED | OUTPATIENT
Start: 2024-06-17

## 2024-07-15 ENCOUNTER — OFFICE VISIT (OUTPATIENT)
Dept: INTERNAL MEDICINE | Facility: CLINIC | Age: 69
End: 2024-07-15
Payer: MEDICARE

## 2024-07-15 VITALS
WEIGHT: 177 LBS | DIASTOLIC BLOOD PRESSURE: 62 MMHG | HEIGHT: 70 IN | OXYGEN SATURATION: 96 % | RESPIRATION RATE: 16 BRPM | BODY MASS INDEX: 25.34 KG/M2 | SYSTOLIC BLOOD PRESSURE: 132 MMHG | HEART RATE: 85 BPM | TEMPERATURE: 99.1 F

## 2024-07-15 DIAGNOSIS — R10.30 LOWER ABDOMINAL PAIN: Primary | ICD-10-CM

## 2024-07-15 PROCEDURE — 1160F RVW MEDS BY RX/DR IN RCRD: CPT | Performed by: INTERNAL MEDICINE

## 2024-07-15 PROCEDURE — 1125F AMNT PAIN NOTED PAIN PRSNT: CPT | Performed by: INTERNAL MEDICINE

## 2024-07-15 PROCEDURE — 1159F MED LIST DOCD IN RCRD: CPT | Performed by: INTERNAL MEDICINE

## 2024-07-15 PROCEDURE — 99213 OFFICE O/P EST LOW 20 MIN: CPT | Performed by: INTERNAL MEDICINE

## 2024-07-15 RX ORDER — CALCIPOTRIENE 50 UG/G
1 OINTMENT TOPICAL 2 TIMES DAILY
COMMUNITY

## 2024-07-15 RX ORDER — CHLORHEXIDINE GLUCONATE 4 %
400 LIQUID (ML) TOPICAL DAILY PRN
COMMUNITY

## 2024-07-15 NOTE — PROGRESS NOTES
Subjective   Chastity Berrios is a 68 y.o. female.     Chief Complaint   Patient presents with    Abdominal Pain         Abdominal Pain  This is a new problem. The current episode started in the past 7 days. The onset quality is sudden. The problem occurs constantly. The problem has been resolved. Associated symptoms include constipation. Pertinent negatives include no fever, nausea or vomiting.        The following portions of the patient's history were reviewed and updated as appropriate: allergies, current medications, past social history and problem list.    Outpatient Medications Marked as Taking for the 7/15/24 encounter (Office Visit) with Alvaro Small MD   Medication Sig Dispense Refill    acetaminophen (TYLENOL) 325 MG tablet Take 2 tablets by mouth Every 6 (Six) Hours As Needed for Mild Pain . 30 tablet 0    aspirin 81 MG EC tablet Take 1 tablet by mouth Daily. 60 tablet 1    Azelastine & Fluticasone 137 & 50 MCG/ACT therapy 1 puff into the nostril(s) as directed by provider 2 (Two) Times a Day. 1 each 12    calcipotriene (DOVONOX) 0.005 % ointment Apply 1 Application topically to the appropriate area as directed 2 (Two) Times a Day.      Cholecalciferol (Vitamin D3) 1.25 MG (28023 UT) capsule TAKE 1 CAPSULE BY MOUTH ONCE WEEKLY 12 capsule 1    clobetasol (TEMOVATE) 0.05 % ointment Apply 1 Application topically to the appropriate area as directed 2 (Two) Times a Day As Needed.      fluticasone (FLONASE) 50 MCG/ACT nasal spray 2 sprays into the nostril(s) as directed by provider Daily As Needed.      loratadine (CLARITIN) 10 MG tablet Take 1 tablet by mouth Daily.      metoprolol succinate XL (TOPROL-XL) 25 MG 24 hr tablet Take 1 tablet by mouth Daily. 90 tablet 3    Multiple Vitamins-Minerals (MULTIVITAMIN ADULTS PO) Take 1 tablet by mouth Daily. HOLDING FOR OR X 7 DAYS      nystatin-triamcinolone (MYCOLOG) 574359-4.1 UNIT/GM-% ointment Apply 1 Application topically to the appropriate area as  directed 2 (Two) Times a Day As Needed.      Probiotic Product (PROBIOTIC DAILY PO) Take  by mouth.      Psyllium (Daily Fiber) 400 MG capsule Take 400 mg by mouth Daily As Needed.      sennosides-docusate (senna-docusate sodium) 8.6-50 MG per tablet Take 1 tablet by mouth 2 (Two) Times a Day. (Patient taking differently: Take 3 tablets by mouth Daily. Takes 2 tablets daily) 60 tablet 0    [DISCONTINUED] calcipotriene (DOVONOX) 0.005 % ointment Apply 1 Application topically to the appropriate area as directed 2 (Two) Times a Day As Needed.         Review of Systems   Constitutional:  Negative for chills and fever.   Gastrointestinal:  Positive for abdominal pain and constipation. Negative for nausea and vomiting.       Objective   Vitals:    07/15/24 1043   BP: 132/62   Pulse: 85   Resp: 16   Temp: 99.1 °F (37.3 °C)   SpO2: 96%      Wt Readings from Last 3 Encounters:   07/15/24 80.3 kg (177 lb)   05/21/24 76.2 kg (168 lb)   01/26/24 74.8 kg (165 lb)    Body mass index is 25.4 kg/m².      Physical Exam  Constitutional:       Appearance: Normal appearance.   Abdominal:      General: There is no distension.      Palpations: Abdomen is soft.      Tenderness: There is no abdominal tenderness. There is no guarding.   Neurological:      Mental Status: She is alert.           Problems Addressed this Visit    None  Visit Diagnoses       Lower abdominal pain    -  Primary          Diagnoses         Codes Comments    Lower abdominal pain    -  Primary ICD-10-CM: R10.30  ICD-9-CM: 789.09           Assessment & Plan   In with a 1 week episode of suprapubic abdominal pain.  Colicky.  Had several bowel movements through the week and symptoms cleared up 2 days ago.  Has had a history of diverticulitis in the past but has always been left lower quadrant.  Never central like the symptoms.  She has some tonny issues with constipation and takes 3 stool softeners daily along with a fiber tablet.  Adds Metamucil when she gets into more  trouble.  Advised today to start taking Metamucil every day rather than the fiber tablet since she is probably under treating herself chronically.  He is due for another colonoscopy.  10 years since last 1.  That is being scheduled right now.    The above information was reviewed again today 07/15/24.  It continues to be accurate as reflected above and is unchanged.  History, physical and review of systems all reviewed and are unchanged.  Medications were reviewed today and continue the current dosing.             Dragon disclaimer:   Part of this note may be an electronic transcription/translation of spoken language to printed text using the Dragon Dictation System.

## 2024-07-18 ENCOUNTER — TELEPHONE (OUTPATIENT)
Dept: GASTROENTEROLOGY | Facility: CLINIC | Age: 69
End: 2024-07-18
Payer: MEDICARE

## 2024-07-18 ENCOUNTER — PREP FOR SURGERY (OUTPATIENT)
Dept: OTHER | Facility: HOSPITAL | Age: 69
End: 2024-07-18
Payer: MEDICARE

## 2024-07-18 DIAGNOSIS — Z12.11 ENCOUNTER FOR SCREENING FOR MALIGNANT NEOPLASM OF COLON: Primary | ICD-10-CM

## 2024-07-18 NOTE — TELEPHONE ENCOUNTER
NO RECORD OF C/S    PERSONAL HX OF POLYPS    NO FAMILY HX OF POLYPS    NO FAMILY HX OF COLON CA            LIST OF  MEDICATIONS  ASPRIN  VITAMIN D3  SKYRIZI  AZELASTINE  FLONASE  MULTI VITAMIN   CLARITIN  STOOL SOFTENER  PROBIOTIC  FIBER CAPSULES  NYSTATIN  CALCIPOTRIENE  CLOBETASOL  METAMUCIL            OA QUESTIONNAIRE SCANNED IN MEDIA

## 2024-07-22 ENCOUNTER — TELEPHONE (OUTPATIENT)
Dept: GASTROENTEROLOGY | Facility: CLINIC | Age: 69
End: 2024-07-22
Payer: MEDICARE

## 2024-07-23 ENCOUNTER — TELEPHONE (OUTPATIENT)
Dept: GASTROENTEROLOGY | Facility: CLINIC | Age: 69
End: 2024-07-23
Payer: MEDICARE

## 2024-07-25 ENCOUNTER — TELEPHONE (OUTPATIENT)
Dept: GASTROENTEROLOGY | Facility: CLINIC | Age: 69
End: 2024-07-25
Payer: MEDICARE

## 2024-07-29 ENCOUNTER — TELEPHONE (OUTPATIENT)
Dept: GASTROENTEROLOGY | Facility: CLINIC | Age: 69
End: 2024-07-29
Payer: MEDICARE

## 2024-08-09 ENCOUNTER — TELEPHONE (OUTPATIENT)
Dept: GASTROENTEROLOGY | Facility: CLINIC | Age: 69
End: 2024-08-09
Payer: MEDICARE

## 2024-08-09 NOTE — TELEPHONE ENCOUNTER
Hub staff attempted to follow warm transfer process and was unsuccessful     Caller: WILDA WHITEHEAD     Relationship to patient: SELF     Best call back number: [phone]     Patient is needing: PT IS CALLING MARGOT BACK TO SCHEDULE COLONOSCOPY PLEASE ADVISE AND CALL PT BACK

## 2024-08-12 ENCOUNTER — TELEPHONE (OUTPATIENT)
Dept: GASTROENTEROLOGY | Facility: CLINIC | Age: 69
End: 2024-08-12
Payer: MEDICARE

## 2024-08-12 PROBLEM — Z12.11 ENCOUNTER FOR SCREENING FOR MALIGNANT NEOPLASM OF COLON: Status: ACTIVE | Noted: 2024-07-18

## 2024-11-21 ENCOUNTER — ANESTHESIA (OUTPATIENT)
Dept: GASTROENTEROLOGY | Facility: HOSPITAL | Age: 69
End: 2024-11-21
Payer: MEDICARE

## 2024-11-21 ENCOUNTER — ANESTHESIA EVENT (OUTPATIENT)
Dept: GASTROENTEROLOGY | Facility: HOSPITAL | Age: 69
End: 2024-11-21
Payer: MEDICARE

## 2024-11-21 ENCOUNTER — HOSPITAL ENCOUNTER (OUTPATIENT)
Facility: HOSPITAL | Age: 69
Setting detail: HOSPITAL OUTPATIENT SURGERY
Discharge: HOME | End: 2024-11-21
Attending: INTERNAL MEDICINE | Admitting: INTERNAL MEDICINE
Payer: MEDICARE

## 2024-11-21 VITALS
BODY MASS INDEX: 28.82 KG/M2 | WEIGHT: 173 LBS | HEIGHT: 65 IN | OXYGEN SATURATION: 100 % | SYSTOLIC BLOOD PRESSURE: 143 MMHG | HEART RATE: 64 BPM | DIASTOLIC BLOOD PRESSURE: 67 MMHG | RESPIRATION RATE: 17 BRPM

## 2024-11-21 DIAGNOSIS — Z12.11 ENCOUNTER FOR SCREENING FOR MALIGNANT NEOPLASM OF COLON: ICD-10-CM

## 2024-11-21 PROCEDURE — 88305 TISSUE EXAM BY PATHOLOGIST: CPT | Performed by: INTERNAL MEDICINE

## 2024-11-21 PROCEDURE — 25010000002 PROPOFOL 10 MG/ML EMULSION: Performed by: ANESTHESIOLOGY

## 2024-11-21 PROCEDURE — 45385 COLONOSCOPY W/LESION REMOVAL: CPT | Performed by: INTERNAL MEDICINE

## 2024-11-21 PROCEDURE — 25010000002 LIDOCAINE 2% SOLUTION: Performed by: ANESTHESIOLOGY

## 2024-11-21 PROCEDURE — 25810000003 LACTATED RINGERS PER 1000 ML: Performed by: INTERNAL MEDICINE

## 2024-11-21 PROCEDURE — S0260 H&P FOR SURGERY: HCPCS | Performed by: INTERNAL MEDICINE

## 2024-11-21 RX ORDER — SODIUM CHLORIDE, SODIUM LACTATE, POTASSIUM CHLORIDE, CALCIUM CHLORIDE 600; 310; 30; 20 MG/100ML; MG/100ML; MG/100ML; MG/100ML
1000 INJECTION, SOLUTION INTRAVENOUS CONTINUOUS
Status: DISCONTINUED | OUTPATIENT
Start: 2024-11-21 | End: 2024-11-21 | Stop reason: HOSPADM

## 2024-11-21 RX ORDER — LIDOCAINE HYDROCHLORIDE 20 MG/ML
INJECTION, SOLUTION INFILTRATION; PERINEURAL AS NEEDED
Status: DISCONTINUED | OUTPATIENT
Start: 2024-11-21 | End: 2024-11-21 | Stop reason: SURG

## 2024-11-21 RX ORDER — SODIUM CHLORIDE 0.9 % (FLUSH) 0.9 %
10 SYRINGE (ML) INJECTION AS NEEDED
Status: DISCONTINUED | OUTPATIENT
Start: 2024-11-21 | End: 2024-11-21 | Stop reason: HOSPADM

## 2024-11-21 RX ORDER — PROPOFOL 10 MG/ML
VIAL (ML) INTRAVENOUS AS NEEDED
Status: DISCONTINUED | OUTPATIENT
Start: 2024-11-21 | End: 2024-11-21 | Stop reason: SURG

## 2024-11-21 RX ADMIN — SODIUM CHLORIDE, SODIUM LACTATE, POTASSIUM CHLORIDE, CALCIUM CHLORIDE 1000 ML: 20; 30; 600; 310 INJECTION, SOLUTION INTRAVENOUS at 15:06

## 2024-11-21 RX ADMIN — PROPOFOL 150 MG: 10 INJECTION, EMULSION INTRAVENOUS at 15:19

## 2024-11-21 RX ADMIN — PROPOFOL 200 MCG/KG/MIN: 10 INJECTION, EMULSION INTRAVENOUS at 15:19

## 2024-11-21 RX ADMIN — LIDOCAINE HYDROCHLORIDE 60 MG: 20 INJECTION, SOLUTION INFILTRATION; PERINEURAL at 15:19

## 2024-11-21 NOTE — DISCHARGE INSTRUCTIONS
For the next 24 hours patient needs to be with a responsible adult.    For 24 hours DO NOT drive, operate machinery, appliances, drink alcohol, make important decisions or sign legal documents.    Start with a light or bland diet if you are feeling sick to your stomach otherwise advance to regular diet as tolerated.    Follow recommendations on procedure report if provided by your doctor.    Call Dr Max for problems [phone]    Problems may include but not limited to: large amounts of bleeding, trouble breathing, repeated vomiting, severe unrelieved pain, fever or chills.

## 2024-11-21 NOTE — ANESTHESIA POSTPROCEDURE EVALUATION
Patient: Chastity Berrios    Procedure Summary       Date: 11/21/24 Room / Location: [facility] ENDOSCOPY 8 / [facility] ENDOSCOPY    Anesthesia Start: 1516 Anesthesia Stop: 1539    Procedure: COLONOSCOPY to cecum with cold polypectomies Diagnosis:       Encounter for screening for malignant neoplasm of colon      (Encounter for screening for malignant neoplasm of colon [Z12.11])    Surgeons: Jacob Max MD Provider: Spencer Infante MD    Anesthesia Type: MAC ASA Status: 3            Anesthesia Type: MAC    Vitals  Vitals Value Taken Time   /52 11/21/24 1537   Temp     Pulse 61 11/21/24 1537   Resp 16 11/21/24 1537   SpO2 100 % 11/21/24 1537           Post Anesthesia Care and Evaluation    Patient location during evaluation: PACU  Patient participation: complete - patient participated  Level of consciousness: awake and alert  Pain management: adequate    Airway patency: patent  Anesthetic complications: No anesthetic complications    Cardiovascular status: acceptable  Respiratory status: acceptable  Hydration status: acceptable    Comments: --------------------            11/21/24               1537     --------------------   BP:       109/52     Pulse:      61       Resp:       16       SpO2:      100%     --------------------

## 2024-11-21 NOTE — H&P
[facility]  Pre Procedure History & Physical    Chief Complaint:   Time for my colonoscopy    Subjective     HPI:   68 y.o. female presenting to endoscopy unit today for surveillance colonoscopy.    Past Medical History:   Past Medical History:   Diagnosis Date    Allergic     Seasonal    Ankle mass     RIGHT    Aortic regurgitation     trace to mild    Aortic valvar stenosis     Arthralgia     both thumbs are getting stiff    Cataract 2009    Colon polyp 2021    Coronary artery disease     Diverticulitis     Diverticulosis 1/2021    Encounter for screening for malignant neoplasm of colon 7/18/2024    Environmental allergies     First degree AV block     Fracture, finger 07/2013    Hyperlipidemia     IGT (impaired glucose tolerance)     Left carotid stenosis 03/09/2018    Migraines     Mild concentric left ventricular hypertrophy (LVH)     Mitral regurgitation     trivial to mild    Osteopenia 2017/2021    Paget's bone disease     Psoriasis     ELBOWS CALOS AND LEGS CALOS    PSVT (paroxysmal supraventricular tachycardia)     Pulmonic regurgitation     trace    Spinal headache     AFTER CHILDBIRTH    SVT (supraventricular tachycardia)     NO RECURRENCE SINCE ABLATION       Family History:  Family History   Problem Relation Age of Onset    Hyperlipidemia Mother     Stroke Mother     Hypertension Father     Diabetes Father     Pancreatic cancer Father     Cancer Father     Breast cancer Maternal Grandmother     Diabetes Maternal Grandfather     Lung cancer Maternal Grandfather     Malig Hyperthermia Neg Hx        Social History:   reports that she has never smoked. She has never been exposed to tobacco smoke. She has never used smokeless tobacco. She reports current alcohol use of about 1.0 standard drink of alcohol per week. She reports that she does not use drugs.    Medications:   Medications Prior to Admission   Medication Sig Dispense Refill Last Dose/Taking    acetaminophen (TYLENOL) 325 MG  "tablet Take 2 tablets by mouth Every 6 (Six) Hours As Needed for Mild Pain . 30 tablet 0 Past Week    aspirin 81 MG EC tablet Take 1 tablet by mouth Daily. 60 tablet 1 11/21/2024 Morning    Azelastine & Fluticasone 137 & 50 MCG/ACT therapy 1 puff into the nostril(s) as directed by provider 2 (Two) Times a Day. 1 each 12 11/21/2024 Morning    Cholecalciferol (Vitamin D3) 1.25 MG (16773 UT) capsule TAKE 1 CAPSULE BY MOUTH ONCE WEEKLY 12 capsule 1 11/16/2024    fluticasone (FLONASE) 50 MCG/ACT nasal spray Administer 2 sprays into the nostril(s) as directed by provider Daily As Needed for Allergies.   11/20/2024 Evening    loratadine (CLARITIN) 10 MG tablet Take 1 tablet by mouth Daily.   11/20/2024 Bedtime    metoprolol succinate XL (TOPROL-XL) 25 MG 24 hr tablet Take 1 tablet by mouth Daily. (Patient taking differently: Take 1 tablet by mouth Every Evening.) 90 tablet 3 11/20/2024 Bedtime    Probiotic Product (PROBIOTIC DAILY PO) Take  by mouth.   Past Week    Psyllium (Daily Fiber) 400 MG capsule Take 400 mg by mouth Daily As Needed.   Past Week    sennosides-docusate (senna-docusate sodium) 8.6-50 MG per tablet Take 1 tablet by mouth 2 (Two) Times a Day. (Patient taking differently: Take 3 tablets by mouth Daily. Takes 2 tablets daily) 60 tablet 0 Past Week       Allergies:  Patient has no known allergies.    Objective     Blood pressure 152/88, pulse 81, resp. rate 18, height 165.1 cm (65\"), weight 78.5 kg (173 lb), SpO2 98%.  Physical Exam:   General: patient awake, alert and cooperative    Assessment & Plan     Diagnosis:  Personal hx of colon polyps    Anticipated Surgical Procedure:  Colonoscopy    The risks, benefits, and alternatives of this procedure have been discussed with the patient or the responsible party- the patient understands and agrees to proceed.                                                                 "

## 2024-11-21 NOTE — ANESTHESIA PREPROCEDURE EVALUATION
Anesthesia Evaluation     Patient summary reviewed and Nursing notes reviewed   history of anesthetic complications:  difficult airway  NPO Solid Status: > 8 hours  NPO Liquid Status: > 2 hours           Airway   Mallampati: III  TM distance: <3 FB  Neck ROM: full  Difficult intubation highly probable and Anterior  Comment: Grade III view with MAC 3 and Montes 3, CMAC D with bougie needed  Dental - normal exam     Pulmonary - normal exam    breath sounds clear to auscultation  Cardiovascular - normal exam    ECG reviewed  Rhythm: regular  Rate: normal    (+) valvular problems/murmurs AI, MR and AS, CAD, dysrhythmias Tachycardia, hyperlipidemia,  carotid artery disease left carotid    ROS comment: Hx AVR in 2/18/hx PSVT, s/p ablation/EF 62%, bioprosthetic aortic valve, mild-mod AI, mod MR by ECHO    Neuro/Psych  (+) headaches    ROS Comment: Migraines  GI/Hepatic/Renal/Endo      Musculoskeletal     Abdominal  - normal exam   Substance History      OB/GYN          Other   arthritis,                 Anesthesia Plan    ASA 3     MAC     intravenous induction     Anesthetic plan, risks, benefits, and alternatives have been provided, discussed and informed consent has been obtained with: patient.      CODE STATUS:

## 2024-11-21 NOTE — H&P
[facility]  Pre Procedure History & Physical    Chief Complaint:   Time for my colonoscopy    Subjective     HPI:   68 y.o. female presenting to endoscopy unit today for surveillance colonoscopy.    Past Medical History:   Past Medical History:   Diagnosis Date    Allergic     Seasonal    Ankle mass     RIGHT    Aortic regurgitation     trace to mild    Aortic valvar stenosis     Arthralgia     both thumbs are getting stiff    Cataract 2009    Colon polyp 2021    Coronary artery disease     Diverticulitis     Diverticulosis 1/2021    Encounter for screening for malignant neoplasm of colon 7/18/2024    Environmental allergies     First degree AV block     Fracture, finger 07/2013    Hyperlipidemia     IGT (impaired glucose tolerance)     Left carotid stenosis 03/09/2018    Migraines     Mild concentric left ventricular hypertrophy (LVH)     Mitral regurgitation     trivial to mild    Osteopenia 2017/2021    Paget's bone disease     Psoriasis     ELBOWS CALOS AND LEGS CALOS    PSVT (paroxysmal supraventricular tachycardia)     Pulmonic regurgitation     trace    Spinal headache     AFTER CHILDBIRTH    SVT (supraventricular tachycardia)     NO RECURRENCE SINCE ABLATION       Family History:  Family History   Problem Relation Age of Onset    Hyperlipidemia Mother     Stroke Mother     Hypertension Father     Diabetes Father     Pancreatic cancer Father     Cancer Father     Breast cancer Maternal Grandmother     Diabetes Maternal Grandfather     Lung cancer Maternal Grandfather     Malig Hyperthermia Neg Hx        Social History:   reports that she has never smoked. She has never been exposed to tobacco smoke. She has never used smokeless tobacco. She reports current alcohol use of about 1.0 standard drink of alcohol per week. She reports that she does not use drugs.    Medications:   Medications Prior to Admission   Medication Sig Dispense Refill Last Dose/Taking    acetaminophen (TYLENOL) 325 MG  "tablet Take 2 tablets by mouth Every 6 (Six) Hours As Needed for Mild Pain . 30 tablet 0 Past Week    aspirin 81 MG EC tablet Take 1 tablet by mouth Daily. 60 tablet 1 11/21/2024 Morning    Azelastine & Fluticasone 137 & 50 MCG/ACT therapy 1 puff into the nostril(s) as directed by provider 2 (Two) Times a Day. 1 each 12 11/21/2024 Morning    Cholecalciferol (Vitamin D3) 1.25 MG (92261 UT) capsule TAKE 1 CAPSULE BY MOUTH ONCE WEEKLY 12 capsule 1 11/16/2024    fluticasone (FLONASE) 50 MCG/ACT nasal spray Administer 2 sprays into the nostril(s) as directed by provider Daily As Needed for Allergies.   11/20/2024 Evening    loratadine (CLARITIN) 10 MG tablet Take 1 tablet by mouth Daily.   11/20/2024 Bedtime    metoprolol succinate XL (TOPROL-XL) 25 MG 24 hr tablet Take 1 tablet by mouth Daily. (Patient taking differently: Take 1 tablet by mouth Every Evening.) 90 tablet 3 11/20/2024 Bedtime    Probiotic Product (PROBIOTIC DAILY PO) Take  by mouth.   Past Week    Psyllium (Daily Fiber) 400 MG capsule Take 400 mg by mouth Daily As Needed.   Past Week    sennosides-docusate (senna-docusate sodium) 8.6-50 MG per tablet Take 1 tablet by mouth 2 (Two) Times a Day. (Patient taking differently: Take 3 tablets by mouth Daily. Takes 2 tablets daily) 60 tablet 0 Past Week       Allergies:  Patient has no known allergies.    Objective     Blood pressure 152/88, pulse 81, resp. rate 18, height 165.1 cm (65\"), weight 78.5 kg (173 lb), SpO2 98%.  Physical Exam:   General: patient awake, alert and cooperative    Assessment & Plan     Diagnosis:  Personal hx of colon polyps    Anticipated Surgical Procedure:  Colonoscopy    The risks, benefits, and alternatives of this procedure have been discussed with the patient or the responsible party- the patient understands and agrees to proceed.                                                                 "

## 2024-11-25 LAB
CYTO UR: NORMAL
LAB AP CASE REPORT: NORMAL
PATH REPORT.FINAL DX SPEC: NORMAL
PATH REPORT.GROSS SPEC: NORMAL

## 2024-11-27 ENCOUNTER — TELEPHONE (OUTPATIENT)
Dept: GASTROENTEROLOGY | Facility: CLINIC | Age: 69
End: 2024-11-27
Payer: MEDICARE

## 2024-11-27 NOTE — TELEPHONE ENCOUNTER
Pt reviewed results via Simfinit.     Sent pt Simfinit msg advising of results and recommendations. Advised to call if any questions.     Colonoscopy placed in  and recall for 11/21/27.

## 2024-11-27 NOTE — TELEPHONE ENCOUNTER
----- Message from Jacob Max sent at 11/25/2024 10:25 AM EST -----  Tubular adenoma colon polyps  Colonoscopy recall 3 years

## 2024-12-03 LAB
ALBUMIN SERPL-MCNC: 4.4 G/DL (ref 3.8–4.8)
ALP SERPL-CCNC: 119 IU/L (ref 44–121)
ALT SERPL-CCNC: 13 IU/L (ref 0–32)
AST SERPL-CCNC: 15 IU/L (ref 0–40)
BASOPHILS # BLD AUTO: 0.1 X10E3/UL (ref 0–0.2)
BASOPHILS NFR BLD AUTO: 1 %
BILIRUB SERPL-MCNC: 0.3 MG/DL (ref 0–1.2)
BUN SERPL-MCNC: 13 MG/DL (ref 8–27)
BUN/CREAT SERPL: 12 (ref 12–28)
CALCIUM SERPL-MCNC: 9.8 MG/DL (ref 8.7–10.3)
CHLORIDE SERPL-SCNC: 102 MMOL/L (ref 96–106)
CHOLEST SERPL-MCNC: 240 MG/DL (ref 100–199)
CHOLEST/HDLC SERPL: 4.2 RATIO (ref 0–4.4)
CO2 SERPL-SCNC: 27 MMOL/L (ref 20–29)
CREAT SERPL-MCNC: 1.11 MG/DL (ref 0.57–1)
EGFRCR SERPLBLD CKD-EPI 2021: 53 ML/MIN/1.73
EOSINOPHIL # BLD AUTO: 0.2 X10E3/UL (ref 0–0.4)
EOSINOPHIL NFR BLD AUTO: 3 %
ERYTHROCYTE [DISTWIDTH] IN BLOOD BY AUTOMATED COUNT: 14.2 % (ref 11.7–15.4)
GLOBULIN SER CALC-MCNC: 3 G/DL (ref 1.5–4.5)
GLUCOSE SERPL-MCNC: 94 MG/DL (ref 70–99)
HBA1C MFR BLD: 6.2 % (ref 4.8–5.6)
HCT VFR BLD AUTO: 38.5 % (ref 34–46.6)
HDLC SERPL-MCNC: 57 MG/DL
HGB BLD-MCNC: 12 G/DL (ref 11.1–15.9)
IMM GRANULOCYTES # BLD AUTO: 0 X10E3/UL (ref 0–0.1)
IMM GRANULOCYTES NFR BLD AUTO: 0 %
LDLC SERPL CALC-MCNC: 156 MG/DL (ref 0–99)
LYMPHOCYTES # BLD AUTO: 2.5 X10E3/UL (ref 0.7–3.1)
LYMPHOCYTES NFR BLD AUTO: 41 %
MCH RBC QN AUTO: 25.7 PG (ref 26.6–33)
MCHC RBC AUTO-ENTMCNC: 31.2 G/DL (ref 31.5–35.7)
MCV RBC AUTO: 82 FL (ref 79–97)
MONOCYTES # BLD AUTO: 0.6 X10E3/UL (ref 0.1–0.9)
MONOCYTES NFR BLD AUTO: 9 %
NEUTROPHILS # BLD AUTO: 2.8 X10E3/UL (ref 1.4–7)
NEUTROPHILS NFR BLD AUTO: 46 %
PLATELET # BLD AUTO: 292 X10E3/UL (ref 150–450)
POTASSIUM SERPL-SCNC: 4.9 MMOL/L (ref 3.5–5.2)
PROT SERPL-MCNC: 7.4 G/DL (ref 6–8.5)
RBC # BLD AUTO: 4.67 X10E6/UL (ref 3.77–5.28)
SODIUM SERPL-SCNC: 139 MMOL/L (ref 134–144)
TRIGL SERPL-MCNC: 152 MG/DL (ref 0–149)
VLDLC SERPL CALC-MCNC: 27 MG/DL (ref 5–40)
WBC # BLD AUTO: 6.1 X10E3/UL (ref 3.4–10.8)

## 2024-12-05 ENCOUNTER — OFFICE VISIT (OUTPATIENT)
Dept: INTERNAL MEDICINE | Facility: CLINIC | Age: 69
End: 2024-12-05
Payer: MEDICARE

## 2024-12-05 VITALS
RESPIRATION RATE: 18 BRPM | HEART RATE: 76 BPM | TEMPERATURE: 97.5 F | WEIGHT: 172 LBS | DIASTOLIC BLOOD PRESSURE: 60 MMHG | BODY MASS INDEX: 28.66 KG/M2 | OXYGEN SATURATION: 98 % | HEIGHT: 65 IN | SYSTOLIC BLOOD PRESSURE: 122 MMHG

## 2024-12-05 DIAGNOSIS — R73.02 IGT (IMPAIRED GLUCOSE TOLERANCE): Chronic | ICD-10-CM

## 2024-12-05 DIAGNOSIS — Z00.00 ENCOUNTER FOR PREVENTIVE HEALTH EXAMINATION: Primary | ICD-10-CM

## 2024-12-05 PROBLEM — M19.079 ARTHRITIS OF FOOT: Status: RESOLVED | Noted: 2021-07-07 | Resolved: 2024-12-05

## 2024-12-05 PROCEDURE — 1126F AMNT PAIN NOTED NONE PRSNT: CPT | Performed by: INTERNAL MEDICINE

## 2024-12-05 PROCEDURE — 1170F FXNL STATUS ASSESSED: CPT | Performed by: INTERNAL MEDICINE

## 2024-12-05 PROCEDURE — G0439 PPPS, SUBSEQ VISIT: HCPCS | Performed by: INTERNAL MEDICINE

## 2024-12-05 PROCEDURE — 1159F MED LIST DOCD IN RCRD: CPT | Performed by: INTERNAL MEDICINE

## 2024-12-05 PROCEDURE — 99397 PER PM REEVAL EST PAT 65+ YR: CPT | Performed by: INTERNAL MEDICINE

## 2024-12-05 PROCEDURE — 1160F RVW MEDS BY RX/DR IN RCRD: CPT | Performed by: INTERNAL MEDICINE

## 2024-12-05 NOTE — PROGRESS NOTES
Subjective   The ABCs of the Annual Wellness Visit  Medicare Wellness Visit      Chastity Berrios is a 69 y.o. patient who presents for a Medicare Wellness Visit.    The following portions of the patient's history were reviewed and   updated as appropriate: allergies, current medications, past family history, past medical history, past social history, past surgical history, and problem list.    Compared to one year ago, the patient's physical   health is the same.  Compared to one year ago, the patient's mental   health is the same.    Recent Hospitalizations:  She was not admitted to the hospital during the last year.     Current Medical Providers:  Patient Care Team:  Alvaro Small MD as PCP - General (Internal Medicine)  Alvaro Small MD as PCP - Internal Medicine  Jose Bonner MD as Consulting Physician (Cardiology)  Fabián Morales MD as Consulting Physician (Cardiology)  Kaia Butcher MD (Dermatology)    Outpatient Medications Prior to Visit   Medication Sig Dispense Refill    acetaminophen (TYLENOL) 325 MG tablet Take 2 tablets by mouth Every 6 (Six) Hours As Needed for Mild Pain . 30 tablet 0    aspirin 81 MG EC tablet Take 1 tablet by mouth Daily. 60 tablet 1    Azelastine & Fluticasone 137 & 50 MCG/ACT therapy 1 puff into the nostril(s) as directed by provider 2 (Two) Times a Day. 1 each 12    Cholecalciferol (Vitamin D3) 1.25 MG (48967 UT) capsule TAKE 1 CAPSULE BY MOUTH ONCE WEEKLY 12 capsule 1    fluticasone (FLONASE) 50 MCG/ACT nasal spray Administer 2 sprays into the nostril(s) as directed by provider Daily As Needed for Allergies.      loratadine (CLARITIN) 10 MG tablet Take 1 tablet by mouth Daily.      metoprolol succinate XL (TOPROL-XL) 25 MG 24 hr tablet Take 1 tablet by mouth Daily. (Patient taking differently: Take 1 tablet by mouth Every Evening.) 90 tablet 3    Probiotic Product (PROBIOTIC DAILY PO) Take  by mouth.      Psyllium (Daily Fiber) 400 MG capsule Take 400 mg by  "mouth Daily As Needed.      sennosides-docusate (senna-docusate sodium) 8.6-50 MG per tablet Take 1 tablet by mouth 2 (Two) Times a Day. (Patient taking differently: Take 3 tablets by mouth Daily. Takes 2 tablets daily) 60 tablet 0     Facility-Administered Medications Prior to Visit   Medication Dose Route Frequency Provider Last Rate Last Admin    Chlorhexidine Gluconate Cloth 2 % pads 1 application  1 application  Topical Q12H PRN Kaylah Prakash, STEPHANIE         No opioid medication identified on active medication list. I have reviewed chart for other potential  high risk medication/s and harmful drug interactions in the elderly.      Aspirin is on active medication list. Aspirin use is indicated based on review of current medical condition/s. Pros and cons of this therapy have been discussed today. Benefits of this medication outweigh potential harm.  Patient has been encouraged to continue taking this medication.  .      Patient Active Problem List   Diagnosis    Psoriasis    Paget's bone disease    Perennial allergic rhinitis    PAT (paroxysmal atrial tachycardia)    Migraine without aura and without status migrainosus, not intractable    IGT (impaired glucose tolerance)    Osteopenia    Vitamin D deficiency    Left carotid stenosis    S/P AVR (aortic valve replacement)    Diverticulitis of large intestine with abscess without bleeding    Tinnitus of both ears    Nonrheumatic aortic valve insufficiency     Advance Care Planning Advance Directive is not on file.  ACP discussion was held with the patient during this visit. Patient has an advance directive (not in EMR), copy requested.            Objective   Vitals:    12/05/24 1020   BP: 122/60   Pulse: 76   Resp: 18   Temp: 97.5 °F (36.4 °C)   TempSrc: Temporal   SpO2: 98%   Weight: 78 kg (172 lb)   Height: 165.1 cm (65\")   PF: 99 L/min   PainSc: 0-No pain       Estimated body mass index is 28.62 kg/m² as calculated from the following:    Height as of this " "encounter: 165.1 cm (65\").    Weight as of this encounter: 78 kg (172 lb).            Does the patient have evidence of cognitive impairment? No  Lab Results   Component Value Date    CHLPL 240 (H) 2024    TRIG 152 (H) 2024    HDL 57 2024     (H) 2024    VLDL 27 2024    HGBA1C 6.2 (H) 2024                                                                                               Health  Risk Assessment    Smoking Status:  Social History     Tobacco Use   Smoking Status Never    Passive exposure: Never   Smokeless Tobacco Never     Alcohol Consumption:  Social History     Substance and Sexual Activity   Alcohol Use Yes    Alcohol/week: 1.0 standard drink of alcohol    Types: 1 Glasses of wine per week    Comment: OCCASIONAL WINE; SOCIAL       Fall Risk Screen  STEADI Fall Risk Assessment was completed, and patient is at LOW risk for falls.Assessment completed on:2024    Depression Screening   Little interest or pleasure in doing things? Not at all   Feeling down, depressed, or hopeless? Not at all   PHQ-2 Total Score 0      Health Habits and Functional and Cognitive Screenin/5/2024    10:19 AM   Functional & Cognitive Status   Do you have difficulty preparing food and eating? No   Do you have difficulty bathing yourself, getting dressed or grooming yourself? No   Do you have difficulty using the toilet? No   Do you have difficulty moving around from place to place? No   Do you have trouble with steps or getting out of a bed or a chair? No   Do you need help using the phone?  No   Are you deaf or do you have serious difficulty hearing?  No   Do you need help to go to places out of walking distance? No   Do you need help shopping? No   Do you need help preparing meals?  No   Do you need help with housework?  No   Do you need help with laundry? No   Do you need help taking your medications? No   Do you need help managing money? No   Do you ever drive or ride " in a car without wearing a seat belt? No   Have you felt unusual stress, anger or loneliness in the last month? No   Who do you live with? Spouse   If you need help, do you have trouble finding someone available to you? No   Have you been bothered in the last four weeks by sexual problems? No   Do you have difficulty concentrating, remembering or making decisions? No           Age-appropriate Screening Schedule:  Refer to the list below for future screening recommendations based on patient's age, sex and/or medical conditions. Orders for these recommended tests are listed in the plan section. The patient has been provided with a written plan.    Health Maintenance List  Health Maintenance   Topic Date Due    ANNUAL WELLNESS VISIT  11/20/2024    BMI FOLLOWUP  07/15/2025    LIPID PANEL  12/02/2025    DXA SCAN  01/30/2026    MAMMOGRAM  02/28/2026    COLORECTAL CANCER SCREENING  11/21/2027    TDAP/TD VACCINES (3 - Td or Tdap) 11/18/2032    COVID-19 Vaccine  Completed    INFLUENZA VACCINE  Completed    Pneumococcal Vaccine 65+  Completed    ZOSTER VACCINE  Completed    HEPATITIS C SCREENING  Discontinued    PAP SMEAR  Discontinued                                                                                                                                                CMS Preventative Services Quick Reference  Risk Factors Identified During Encounter  Fall Risk-High or Moderate: Information on Fall Prevention Shared in After Visit Summary  Normal Balance     The above risks/problems have been discussed with the patient.  Pertinent information has been shared with the patient in the After Visit Summary.  An After Visit Summary and PPPS were made available to the patient.    Follow Up:   Next Medicare Wellness visit to be scheduled in 1 year.     Assessment & Plan  Encounter for preventive health examination         IGT (impaired glucose tolerance)              Follow Up:   No follow-ups on file.

## 2024-12-05 NOTE — PROGRESS NOTES
Subjective   Chastity Berrios is a 69 y.o. female.     Chief Complaint   Patient presents with    Medicare Wellness-subsequent    Hyperglycemia    Paget's Bone Diease          History of Present Illness  In for annual preventive exam.  Sleep is good.  Gets 7-1/2-8 hours per night.  Exercises 2-3 days/week.  Energy is good.  Diet is reasonably balanced.  Pertinent negative symptoms include no abdominal pain, no chest pain, no chills, no cough, no diaphoresis, no fatigue, no fever, no headaches, no myalgias, no nausea, no numbness, no dysuria, no vomiting and no weakness.        The following portions of the patient's history were reviewed and updated as appropriate: allergies, current medications, past social history and problem list.    Outpatient Medications Marked as Taking for the 12/5/24 encounter (Office Visit) with Alvaro Small MD   Medication Sig Dispense Refill    acetaminophen (TYLENOL) 325 MG tablet Take 2 tablets by mouth Every 6 (Six) Hours As Needed for Mild Pain . 30 tablet 0    aspirin 81 MG EC tablet Take 1 tablet by mouth Daily. 60 tablet 1    Azelastine & Fluticasone 137 & 50 MCG/ACT therapy 1 puff into the nostril(s) as directed by provider 2 (Two) Times a Day. 1 each 12    Cholecalciferol (Vitamin D3) 1.25 MG (49913 UT) capsule TAKE 1 CAPSULE BY MOUTH ONCE WEEKLY 12 capsule 1    fluticasone (FLONASE) 50 MCG/ACT nasal spray Administer 2 sprays into the nostril(s) as directed by provider Daily As Needed for Allergies.      loratadine (CLARITIN) 10 MG tablet Take 1 tablet by mouth Daily.      metoprolol succinate XL (TOPROL-XL) 25 MG 24 hr tablet Take 1 tablet by mouth Daily. (Patient taking differently: Take 1 tablet by mouth Every Evening.) 90 tablet 3    Probiotic Product (PROBIOTIC DAILY PO) Take  by mouth.      Psyllium (Daily Fiber) 400 MG capsule Take 400 mg by mouth Daily As Needed.      sennosides-docusate (senna-docusate sodium) 8.6-50 MG per tablet Take 1 tablet by mouth 2 (Two)  Times a Day. (Patient taking differently: Take 3 tablets by mouth Daily. Takes 2 tablets daily) 60 tablet 0       Review of Systems   Constitutional:  Negative for appetite change, chills, diaphoresis, fatigue, fever and unexpected weight change.   Respiratory:  Negative for cough, chest tightness, shortness of breath and wheezing.    Cardiovascular:  Negative for chest pain, palpitations and leg swelling.   Gastrointestinal:  Positive for constipation. Negative for abdominal pain, anal bleeding, blood in stool, diarrhea, nausea, rectal pain and vomiting.   Endocrine: Negative for cold intolerance, heat intolerance and polyuria.   Genitourinary:  Negative for difficulty urinating, dysuria, flank pain, frequency, hematuria and urgency.   Musculoskeletal:  Negative for arthralgias, back pain and myalgias.   Allergic/Immunologic: Negative for environmental allergies.   Neurological:  Negative for dizziness, syncope, speech difficulty, weakness, light-headedness, numbness and headaches.   Hematological:  Does not bruise/bleed easily.   Psychiatric/Behavioral:  Negative for agitation, confusion, dysphoric mood and sleep disturbance. The patient is not nervous/anxious.        Objective   Vitals:    12/05/24 1020   BP: 122/60   Pulse: 76   Resp: 18   Temp: 97.5 °F (36.4 °C)   SpO2: 98%      Wt Readings from Last 3 Encounters:   12/05/24 78 kg (172 lb)   11/21/24 78.5 kg (173 lb)   07/15/24 80.3 kg (177 lb)    Body mass index is 28.62 kg/m².      Physical Exam  Vitals and nursing note reviewed.   Constitutional:       Appearance: Normal appearance. She is well-developed.   HENT:      Right Ear: Tympanic membrane and external ear normal.      Left Ear: Tympanic membrane and external ear normal.      Nose: Nose normal.   Eyes:      Extraocular Movements: Extraocular movements intact.      Conjunctiva/sclera: Conjunctivae normal.      Pupils: Pupils are equal, round, and reactive to light.   Neck:      Thyroid: No thyromegaly.       Vascular: No JVD.   Cardiovascular:      Rate and Rhythm: Normal rate and regular rhythm.      Heart sounds: Murmur heard.      Crescendo decrescendo systolic murmur is present with a grade of 2/6.      Diastolic murmur is present with a grade of 2/4.      No gallop.      Comments: Grade 2 FLOR at the aortic area along with a grade 2 diastolic blow at the aortic area  Pulmonary:      Effort: Pulmonary effort is normal. No respiratory distress.      Breath sounds: Normal breath sounds. No wheezing or rales.   Abdominal:      General: Bowel sounds are normal. There is no distension.      Palpations: Abdomen is soft. There is no mass.      Tenderness: There is no abdominal tenderness. There is no guarding.      Hernia: No hernia is present.   Musculoskeletal:         General: Normal range of motion.      Cervical back: Normal range of motion and neck supple.   Lymphadenopathy:      Cervical: No cervical adenopathy.   Skin:     General: Skin is warm and dry.   Neurological:      General: No focal deficit present.      Mental Status: She is alert and oriented to person, place, and time.      Cranial Nerves: No cranial nerve deficit.      Coordination: Coordination normal.      Deep Tendon Reflexes: Reflexes normal.   Psychiatric:         Mood and Affect: Mood normal.         Behavior: Behavior normal.         Thought Content: Thought content normal.         Judgment: Judgment normal.           Problems Addressed this Visit          Endocrine and Metabolic    IGT (impaired glucose tolerance) (Chronic)                    Other Visit Diagnoses       Encounter for preventive health examination    -  Primary          Diagnoses         Codes Comments    Encounter for preventive health examination    -  Primary ICD-10-CM: Z00.00  ICD-9-CM: V70.0     IGT (impaired glucose tolerance)     ICD-10-CM: R73.02  ICD-9-CM: 790.22           Assessment & Plan   In for annual preventive exam and annual wellness visit today December  2024.  Overall health appears to be excellent.  Annual lab work today including CBC, CMP, lipids, A1c, UA.  That is reviewed with patient and looks pretty good today.  She has history of IGT and AVR along with PSVT.  She is working on diet and trying to lose a few pounds.  We will continue to monitor at 6-month intervals.  Glucose and A1c every 6 months.  She does have some aortic insufficiency which we have noted before and in 2020 a paravalvular leak was noted on her AVR.  That will continue to be followed by cardiology.      Prevention counseling was performed today. The counseling performed was routine health maintenance topics including BMI and exercise.    The above information was reviewed again today 12/05/24.  It continues to be accurate as reflected above and is unchanged.  History, physical and review of systems all reviewed and are unchanged.  Medications were reviewed today and continue the current dosing.             Vonnie disclaimer:   Much of this encounter note is an electronic transcription/translation of spoken language to printed text. The electronic translation of spoken language may permit erroneous, or at times, nonsensical words or phrases to be inadvertently transcribed; Although I have reviewed the note for such errors, some may still exist.

## 2024-12-09 RX ORDER — CHOLECALCIFEROL (VITAMIN D3) 1250 MCG
50000 CAPSULE ORAL WEEKLY
Qty: 12 CAPSULE | Refills: 1 | Status: SHIPPED | OUTPATIENT
Start: 2024-12-09

## 2025-01-20 ENCOUNTER — OFFICE VISIT (OUTPATIENT)
Dept: CARDIOLOGY | Facility: CLINIC | Age: 70
End: 2025-01-20
Payer: MEDICARE

## 2025-01-20 VITALS
BODY MASS INDEX: 28.82 KG/M2 | WEIGHT: 173 LBS | SYSTOLIC BLOOD PRESSURE: 148 MMHG | HEART RATE: 82 BPM | HEIGHT: 65 IN | DIASTOLIC BLOOD PRESSURE: 68 MMHG

## 2025-01-20 DIAGNOSIS — I35.1 NONRHEUMATIC AORTIC VALVE INSUFFICIENCY: ICD-10-CM

## 2025-01-20 DIAGNOSIS — Z95.2 S/P AVR (AORTIC VALVE REPLACEMENT): Primary | Chronic | ICD-10-CM

## 2025-01-20 DIAGNOSIS — I47.19 PAT (PAROXYSMAL ATRIAL TACHYCARDIA): Chronic | ICD-10-CM

## 2025-01-20 PROCEDURE — 93000 ELECTROCARDIOGRAM COMPLETE: CPT | Performed by: INTERNAL MEDICINE

## 2025-01-20 PROCEDURE — 99214 OFFICE O/P EST MOD 30 MIN: CPT | Performed by: INTERNAL MEDICINE

## 2025-01-20 RX ORDER — RISANKIZUMAB-RZAA 75 MG/0.83
KIT SUBCUTANEOUS
COMMUNITY
Start: 2024-05-17

## 2025-01-20 NOTE — PROGRESS NOTES
"      CARDIOLOGY    Jose Bonner MD    ENCOUNTER DATE:  01/20/2025    Chastity Berrios / 69 y.o. / female        CHIEF COMPLAINT / REASON FOR OFFICE VISIT     S/P AVR (aortic valve replacement) (01/10/2024 Follow up)      HISTORY OF PRESENT ILLNESS       HPI  Chastity Berrios is a 69 y.o. female who presents today for reevaluation.  Clinically patient is doing well no chest pain shortness breath palpitations lightheadedness swelling or fatigue.  Her  does know that with a little bit of exertion she is more short of breath than before.  If she walks steps or goes up an incline she gets winded more than she did in the past.      The following portions of the patient's history were reviewed and updated as appropriate: allergies, current medications, past family history, past medical history, past social history, past surgical history and problem list.      VITAL SIGNS     Visit Vitals  /68 (BP Location: Left arm)   Pulse 82   Ht 165.1 cm (65\")   Wt 78.5 kg (173 lb)   BMI 28.79 kg/m²         Wt Readings from Last 3 Encounters:   01/20/25 78.5 kg (173 lb)   12/05/24 78 kg (172 lb)   11/21/24 78.5 kg (173 lb)     Body mass index is 28.79 kg/m².      REVIEW OF SYSTEMS   ROS        PHYSICAL EXAMINATION     Vitals reviewed.   Cardiovascular:      Normal rate. Regular rhythm. Normal S1. Normal S2.       Murmurs: There is a grade 2/4 high frequency blowing decrescendo, early diastolic murmur, radiating to the apex.      No gallop.  No click. No rub.   Pulses:     Intact distal pulses.   Edema:     Peripheral edema absent.           REVIEWED DATA       ECG 12 Lead    Date/Time: 1/20/2025 1:57 PM  Performed by: Jose Bonner MD    Authorized by: Jose Bonner MD  Comparison: compared with previous ECG from 1/10/2024  Similar to previous ECG  Rhythm: sinus rhythm    Clinical impression: normal ECG          Cardiac Procedures:      Lipid Panel          12/2/2024    10:24   Lipid Panel   Total Cholesterol " 240    Triglycerides 152    HDL Cholesterol 57    VLDL Cholesterol 27    LDL Cholesterol  156          ASSESSMENT & PLAN      Diagnosis Plan   1. S/P AVR (aortic valve replacement)        2. PAT (paroxysmal atrial tachycardia)        3. Nonrheumatic aortic valve insufficiency              SUMMARY/DISCUSSION  History aortic valve replacement.  Will recheck her echo next year.  Atrial arrhythmia.  None further  Patient is get a little winded with exertion.  I told her to do cardiovascular type exercise.  This is different than playing pickle ball.  He is going to roll back at the [facility] and do her cardio again.  Follow-up in a year sooner if issues occur.        MEDICATIONS         Discharge Medications            Accurate as of January 20, 2025  1:33 PM. If you have any questions, ask your nurse or doctor.                Changes to Medications        Instructions Start Date   metoprolol succinate XL 25 MG 24 hr tablet  Commonly known as: TOPROL-XL  What changed: when to take this   25 mg, Oral, Daily      sennosides-docusate 8.6-50 MG per tablet  Commonly known as: PERICOLACE  What changed:   how much to take  when to take this  additional instructions   1 tablet, Oral, 2 Times Daily             Continue These Medications        Instructions Start Date   acetaminophen 325 MG tablet  Commonly known as: TYLENOL   650 mg, Oral, Every 6 Hours PRN      aspirin 81 MG EC tablet   81 mg, Oral, Daily      Azelastine & Fluticasone 137 & 50 MCG/ACT therapy   1 puff, Nasal, 2 Times Daily      Daily Fiber 400 MG capsule  Generic drug: Psyllium   400 mg, Daily PRN      fluticasone 50 MCG/ACT nasal spray  Commonly known as: FLONASE   2 sprays, Daily PRN      loratadine 10 MG tablet  Commonly known as: CLARITIN   10 mg, Daily      PROBIOTIC DAILY PO   Take  by mouth.      Skyrizi (150 MG Dose) 75 MG/0.83ML Prefilled Syringe Kit kit  Generic drug: Risankizumab-rzaa(150 MG Dose)       Vitamin D3 1.25 MG (35912 UT) capsule   50,000  Units, Oral, Weekly                   **Kassandraon Disclaimer:   Much of this encounter note is an electronic transcription/translation of spoken language to printed text. The electronic translation of spoken language may permit erroneous, or at times, nonsensical words or phrases to be inadvertently transcribed. Although I have reviewed the note for such errors, some may still exist.

## 2025-02-13 ENCOUNTER — TELEMEDICINE (OUTPATIENT)
Dept: INTERNAL MEDICINE | Facility: CLINIC | Age: 70
End: 2025-02-13
Payer: MEDICARE

## 2025-02-13 DIAGNOSIS — J40 BRONCHITIS: Primary | ICD-10-CM

## 2025-02-13 PROCEDURE — 1126F AMNT PAIN NOTED NONE PRSNT: CPT | Performed by: INTERNAL MEDICINE

## 2025-02-13 PROCEDURE — 1159F MED LIST DOCD IN RCRD: CPT | Performed by: INTERNAL MEDICINE

## 2025-02-13 PROCEDURE — 1160F RVW MEDS BY RX/DR IN RCRD: CPT | Performed by: INTERNAL MEDICINE

## 2025-02-13 PROCEDURE — 99214 OFFICE O/P EST MOD 30 MIN: CPT | Performed by: INTERNAL MEDICINE

## 2025-02-13 RX ORDER — ALBUTEROL SULFATE 90 UG/1
2 INHALANT RESPIRATORY (INHALATION) EVERY 4 HOURS PRN
Qty: 18 G | Refills: 1 | Status: SHIPPED | OUTPATIENT
Start: 2025-02-13

## 2025-02-13 NOTE — PROGRESS NOTES
Mode of Visit: Video  Location of patient: home  Location of provider: [facility] clinic  You have chosen to receive care through a telehealth visit.  Does the patient consent to use a video/audio connection for your medical care today? Yes  The visit included audio and video interaction. No technical issues occurred during this visit.          Subjective   Chastity Berrios is a 69 y.o. female.     Chief Complaint   Patient presents with    Cough         Cough  This is a new problem. The current episode started 1 to 4 weeks ago. Associated symptoms include a fever, headaches and myalgias. Pertinent negatives include no chills or rhinorrhea.        The following portions of the patient's history were reviewed and updated as appropriate: allergies, current medications, past social history and problem list.    Outpatient Medications Marked as Taking for the 2/13/25 encounter (Telemedicine) with Alvaro Small MD   Medication Sig Dispense Refill    acetaminophen (TYLENOL) 325 MG tablet Take 2 tablets by mouth Every 6 (Six) Hours As Needed for Mild Pain . 30 tablet 0    aspirin 81 MG EC tablet Take 1 tablet by mouth Daily. 60 tablet 1    Azelastine-Fluticasone 137-50 MCG/ACT suspension       Cholecalciferol (Vitamin D3) 1.25 MG (00582 UT) capsule TAKE 1 CAPSULE BY MOUTH ONCE WEEKLY 12 capsule 1    fluticasone (FLONASE) 50 MCG/ACT nasal spray Administer 2 sprays into the nostril(s) as directed by provider Daily As Needed for Allergies.      loratadine (CLARITIN) 10 MG tablet Take 1 tablet by mouth Daily.      metoprolol succinate XL (TOPROL-XL) 25 MG 24 hr tablet Take 1 tablet by mouth Daily. (Patient taking differently: Take 1 tablet by mouth Every Evening.) 90 tablet 3    Probiotic Product (PROBIOTIC DAILY PO) Take  by mouth.      Psyllium (Daily Fiber) 400 MG capsule Take 400 mg by mouth Daily As Needed.      Risankizumab-rzaa,150 MG Dose, (Skyrizi, 150 MG Dose,) 75 MG/0.83ML Prefilled Syringe Kit kit       sennosides-docusate  (senna-docusate sodium) 8.6-50 MG per tablet Take 1 tablet by mouth 2 (Two) Times a Day. (Patient taking differently: Take 3 tablets by mouth Daily. Takes 2 tablets daily) 60 tablet 0       Review of Systems   Constitutional:  Positive for fatigue and fever. Negative for chills.   HENT:  Negative for rhinorrhea.    Respiratory:  Positive for cough.    Musculoskeletal:  Positive for myalgias.   Neurological:  Positive for headaches.       Objective   There were no vitals filed for this visit.   Wt Readings from Last 3 Encounters:   02/06/25 78.9 kg (174 lb)   01/20/25 78.5 kg (173 lb)   12/05/24 78 kg (172 lb)    There is no height or weight on file to calculate BMI.      Physical Exam  Constitutional:       Appearance: Normal appearance.   Pulmonary:      Effort: Pulmonary effort is normal.   Neurological:      Mental Status: She is alert.   Psychiatric:         Mood and Affect: Mood normal.         Behavior: Behavior normal.         Thought Content: Thought content normal.         Judgment: Judgment normal.           Problems Addressed this Visit    None  Visit Diagnoses       Bronchitis    -  Primary          Diagnoses         Codes Comments    Bronchitis    -  Primary ICD-10-CM: J40  ICD-9-CM: 490           Assessment & Plan   Video visit.  In today with cough.  Chest congestion.  Some sputum production.  She has had fever to 101.  Body aches.  Headaches.  She has been sick for about 10 days.  Was seen 1 week ago at [facility] and diagnosed with flu.  Given a round of Tamiflu.  She got better but seems to be getting worse at this point again.  The concern would be a secondary bacterial infection at this point.  She is immunocompromised.  On the monoclonal antibody.  Will go ahead and start Augmentin 875 twice daily for 10 days.  If she is not improving she will start on albuterol inhaler in addition.  Her medications are reviewed in total today.  Dovo platform used for the visit today.    The above  information was reviewed again today 02/13/25.  It continues to be accurate as reflected above and is unchanged.  History, physical and review of systems all reviewed and are unchanged.  Medications were reviewed today and continue the current dosing.               Dragon disclaimer:   Part of this note may be an electronic transcription/translation of spoken language to printed text using the Dragon Dictation System.

## 2025-02-18 RX ORDER — METOPROLOL SUCCINATE 25 MG/1
25 TABLET, EXTENDED RELEASE ORAL DAILY
Qty: 90 TABLET | Refills: 1 | Status: SHIPPED | OUTPATIENT
Start: 2025-02-18

## 2025-04-25 DIAGNOSIS — M25.519 SHOULDER PAIN, UNSPECIFIED CHRONICITY, UNSPECIFIED LATERALITY: Primary | ICD-10-CM

## 2025-05-06 NOTE — PROGRESS NOTES
New Shoulder      Patient: Chastity Berrios        YOB: 1955    Medical Record Number: [id number]        Chief Complaints: Left shoulder right knee pain      History of Present Illness: This is a 69-year-old female who presents complaining of left shoulder pain she is left-hand dominant began in March she was playing pickle ball and did overhead hit she states it is improved somewhat she has had night pain in the past no history of similar symptoms her pain is primarily anterior she is also complaining of right knee pain that began about 1 week ago no history injury change in activities.  She feels like she has some swelling initially was posterior but now her pain is anterior no prior history of similar symptoms      Allergies: No Known Allergies    Medications:   Home Medications:  Current Outpatient Medications on File Prior to Visit   Medication Sig    acetaminophen (TYLENOL) 325 MG tablet Take 2 tablets by mouth Every 6 (Six) Hours As Needed for Mild Pain .    amoxicillin-clavulanate (AUGMENTIN) 875-125 MG per tablet Take 1 tablet by mouth Every 12 (Twelve) Hours.    aspirin 81 MG EC tablet Take 1 tablet by mouth Daily.    Azelastine-Fluticasone 137-50 MCG/ACT suspension     Cholecalciferol (Vitamin D3) 1.25 MG (58920 UT) capsule TAKE 1 CAPSULE BY MOUTH ONCE WEEKLY    fluticasone (FLONASE) 50 MCG/ACT nasal spray Administer 2 sprays into the nostril(s) as directed by provider Daily As Needed for Allergies.    loratadine (CLARITIN) 10 MG tablet Take 1 tablet by mouth Daily.    metoprolol succinate XL (TOPROL-XL) 25 MG 24 hr tablet TAKE 1 TABLET BY MOUTH DAILY    Probiotic Product (PROBIOTIC DAILY PO) Take  by mouth.    Psyllium (Daily Fiber) 400 MG capsule Take 400 mg by mouth Daily As Needed.    Risankizumab-rzaa,150 MG Dose, (Skyrizi, 150 MG Dose,) 75 MG/0.83ML Prefilled Syringe Kit kit     sennosides-docusate (senna-docusate sodium) 8.6-50 MG per tablet Take 1 tablet by mouth 2 (Two) Times a  Day. (Patient taking differently: Take 3 tablets by mouth Daily. Takes 2 tablets daily)    albuterol sulfate  (90 Base) MCG/ACT inhaler Inhale 2 puffs Every 4 (Four) Hours As Needed for Wheezing. (Patient not taking: Reported on 5/8/2025)     Current Facility-Administered Medications on File Prior to Visit   Medication    Chlorhexidine Gluconate Cloth 2 % pads 1 application     Current Medications:  Scheduled Meds:  Continuous Infusions:No current facility-administered medications for this visit.    PRN Meds:.    Past Medical History:   Diagnosis Date    Allergic     Seasonal    Ankle mass     RIGHT    Aortic regurgitation     trace to mild    Aortic valvar stenosis     Aortic valve replaced 2018    Arthralgia     both thumbs are getting stiff    Cataract 2009    Colon polyp 2021    Coronary artery disease     Diverticulitis     Diverticulosis 1/2021    Encounter for screening for malignant neoplasm of colon 07/18/2024    Environmental allergies     First degree AV block     Fracture, finger 07/2013    Hard to intubate     Hyperlipidemia     IGT (impaired glucose tolerance)     Knee sprain 1970    Had water on the knee- fluid drained    Left carotid stenosis 03/09/2018    Migraines     Mild concentric left ventricular hypertrophy (LVH)     Mitral regurgitation     trivial to mild    Osteopenia 2017/2021    Paget's bone disease     Psoriasis     ELBOWS CALOS AND LEGS CALOS    PSVT (paroxysmal supraventricular tachycardia)     Pulmonic regurgitation     trace    Spinal headache     AFTER CHILDBIRTH    SVT (supraventricular tachycardia)     NO RECURRENCE SINCE ABLATION    Tennis elbow 2019    Pickleball-left    Wrist sprain 1983    Exercise weight too heavy        Past Surgical History:   Procedure Laterality Date    ABLATION OF DYSRHYTHMIC FOCUS  2014    AORTIC VALVE REPAIR/REPLACEMENT N/A 02/27/2018    Procedure: EMANI MINI STERNOTOMY AORTIC VALVE REPLACEMENT PRP;  Surgeon: Jean Claude Jones MD;  Location: [facility]  MAIN OR;  Service:     CARDIAC ABLATION  02/14/2014    CARDIAC CATHETERIZATION  01/06/2014    CARDIAC CATHETERIZATION N/A 01/24/2018    Procedure: Right and Left Heart Cath;  Surgeon: Fabián Morales MD;  Location: [facility] CATH INVASIVE LOCATION;  Service:     CARDIAC CATHETERIZATION N/A 01/24/2018    Procedure: Coronary angiography;  Surgeon: Fabián Morales MD;  Location: [facility] CATH INVASIVE LOCATION;  Service:     CARDIAC CATHETERIZATION N/A 01/24/2018    Procedure: Left ventriculography;  Surgeon: Fabián Morales MD;  Location: [facility] CATH INVASIVE LOCATION;  Service:     CARDIAC SURGERY  2014, 2018    Ablation 2014, aortic value replacement 2018    CARDIAC VALVE REPLACEMENT  2/2018    CATARACT EXTRACTION      COLONOSCOPY      COLONOSCOPY N/A 11/21/2024    Procedure: COLONOSCOPY to cecum with cold polypectomies;  Surgeon: Jacob Max MD;  Location: [facility] ENDOSCOPY;  Service: Gastroenterology;  Laterality: N/A;  PRE - screening  POST - diverticulosis, polyps    EXCISION MASS LEG Right 11/19/2021    Procedure: Right ankle mass excision;  Surgeon: Alvaro Morrison MD;  Location: [facility] OR [place];  Service: Orthopedics;  Laterality: Right;    FOOT SURGERY  11/19/2021    Mass on right ankle    LASIK      TONSILLECTOMY      TONSILLECTOMY  1957        Social History     Occupational History    Occupation: housewife   Tobacco Use    Smoking status: Never     Passive exposure: Never    Smokeless tobacco: Never   Vaping Use    Vaping status: Never Used   Substance and Sexual Activity    Alcohol use: Yes     Alcohol/week: 1.0 standard drink of alcohol     Types: 1 Glasses of wine per week     Comment: OCCASIONAL WINE; SOCIAL    Drug use: Never    Sexual activity: Not Currently     Partners: Male     Birth control/protection: Post-menopausal      Social History     Social History Narrative    Not on file        Family History   Problem Relation Age of Onset    Hyperlipidemia Mother     Stroke  "Mother     Hypertension Father     Diabetes Father     Pancreatic cancer Father     Cancer Father     Cancer Brother     Diabetes Brother     Breast cancer Maternal Grandmother     Diabetes Maternal Grandfather     Lung cancer Maternal Grandfather     Malig Hyperthermia Neg Hx              Review of Systems:     Review of Systems      Physical Exam: 69 y.o. female  General Appearance:    Alert, cooperative, in no acute distress                   Vitals:    05/08/25 1009   Temp: 98.6 °F (37 °C)   Weight: 80.6 kg (177 lb 9.6 oz)   Height: 165.1 cm (65\")   PainSc: 7    PainLoc: Shoulder      Patient is alert and read ×3 no acute distress appears her above-listed at height weight and age.  Affect is normal respiratory rate is normal unlabored. Heart rate regular rate rhythm, sclera, dentition and hearing are normal for the purpose of this exam.    Ortho Exam  Physical exam of the left shoulder reveals no overlying skin changes no lymphedema no lymphadenopathy.  Patient has active flexion 180 with mild symptoms abduction is similar external rotation is to 50 and internal rotation to the upper lumbar spine with mild symptoms.  Patient has good rotator cuff strength 4+ over 5 with isometric strength testing with pain.  Patient has a positive impingement and a positive Patel sign.  Patient has good cervical range of motion which is full and asymptomatic no radicular symptoms.  Patient has a normal elbow exam.  Good distal pulses are presentPatient has pain with overhead activity and a positive Neer sign and a positive empty can sign  They have a positive drop arm any definitive painful arc       Physical exam of the right knee reveals no effusion, no erythema.  The patient has no palpable tenderness along the medial joint line, no tenderness about the lateral joint line.  Patient does have crepitus with patellofemoral range of motion.  They also have subjective symptoms anteriorly during exam.  The patient has a negative " bounce home, negative Dixie and a stable ligamentous exam.  Quad tone is reasonable and symmetric.  There are no overlying skin changes no lymphedema no lymphadenopathy.  There is good hip range of motion which is full symmetric and asymptomatic and a normal ankle exam.  Hamstrings and IT band are tight bilaterally.     Procedures          Radiology:   AP, Scapular Y and Axillary Lateral of the left shoulder were ordered/reviewed to evauate shoulder pain.  I have no comparative films she has some acromioclavicular arthritis otherwise no acute bony pathology.  AP lateral merchant of the right knee were taken to evaluate her symptoms no comparative films she has some mild patellofemoral OA otherwise no acute bony pathology  Imaging Results (Most Recent)       Procedure Component Value Units Date/Time    XR Knee 3 View Right [[id number]] Resulted: 05/08/25 1030     Updated: 05/08/25 1030    Impression:      Ordering physician's impression is located in the Encounter Note dated 05/08/25. X-ray performed in the DR room.      XR Shoulder 2+ View Left [[id number]] Resulted: 05/08/25 0948     Updated: 05/08/25 0948    Impression:      Ordering physician's impression is located in the Encounter Note dated 05/08/25. X-ray performed in the DR room.            Assessment/Plan: Left shoulder pain I think is rotator cuff in some fashion plan is to proceed with conservative measures we talked about injection she does not want to do that at this time we will start her on some oral anti-inflammatories we will put her on meloxicam with 3 precautions for short period of time and have her see physical therapy.  If she fails to improve with that we will pursue other means of testing.  As far as her knee goes I really think this is more patellofemoral we will follow the same plan she wants to hold off on the injection we will start her on meloxicam and have her see physical therapy if she fails to improve we will pursue other means of  testing

## 2025-05-08 ENCOUNTER — OFFICE VISIT (OUTPATIENT)
Dept: ORTHOPEDIC SURGERY | Facility: CLINIC | Age: 70
End: 2025-05-08
Payer: MEDICARE

## 2025-05-08 VITALS — TEMPERATURE: 98.6 F | HEIGHT: 65 IN | BODY MASS INDEX: 29.59 KG/M2 | WEIGHT: 177.6 LBS

## 2025-05-08 DIAGNOSIS — M17.10 PATELLOFEMORAL ARTHRITIS: ICD-10-CM

## 2025-05-08 DIAGNOSIS — M25.512 LEFT SHOULDER PAIN, UNSPECIFIED CHRONICITY: Primary | ICD-10-CM

## 2025-05-08 DIAGNOSIS — M75.42 IMPINGEMENT SYNDROME OF LEFT SHOULDER: ICD-10-CM

## 2025-05-08 RX ORDER — MELOXICAM 15 MG/1
TABLET ORAL
Qty: 14 TABLET | Refills: 0 | Status: SHIPPED | OUTPATIENT
Start: 2025-05-08

## 2025-05-15 ENCOUNTER — TREATMENT (OUTPATIENT)
Dept: PHYSICAL THERAPY | Facility: CLINIC | Age: 70
End: 2025-05-15
Payer: MEDICARE

## 2025-05-15 DIAGNOSIS — M25.512 ACUTE PAIN OF LEFT SHOULDER: ICD-10-CM

## 2025-05-15 DIAGNOSIS — M25.561 ACUTE PAIN OF RIGHT KNEE: Primary | ICD-10-CM

## 2025-05-15 NOTE — PATIENT INSTRUCTIONS
Access Code: [email or web address]  URL: [email or web address]  Date: 05/15/2025  Prepared by: Neo Louis    Exercises  - Prone Single Arm Shoulder Y  - 1 x daily - 7 x weekly - 2 sets - 10 reps  - Prone Shoulder Horizontal Abduction  - 1 x daily - 7 x weekly - 2 sets - 10 reps  - Prone Shoulder Extension - Single Arm  - 1 x daily - 7 x weekly - 2 sets - 10 reps  - Bridge with Hip Abduction and Resistance  - 1 x daily - 7 x weekly - 2 sets - 10 reps  - Lateral Step Down  - 1 x daily - 7 x weekly - 2 sets - 10 reps

## 2025-05-15 NOTE — PROGRESS NOTES
"Physical Therapy Initial Evaluation and Plan of Care        Patient: Chastity Berrios   : 1955  Diagnosis/ICD-10 Code:  Acute pain of right knee [M25.561]  Referring practitioner: Alma Gar MD  Date of Initial Visit: 5/15/2025  Today's Date: 5/15/2025  Patient seen for 1 sessions           Subjective Questionnaire: WOMAC: 18/96, QuickDASH: 11.34%      Subjective Evaluation    History of Present Illness  Mechanism of injury: Chastity is a 69 year old female who presents with R knee and L shoulder pain. Her L shoulder pain has almost completely resolved. She was playing pickleball and twisted her R knee. She struggles to sleep due to knee pain, she cannot play pickleball due to the knee. She's been trying to stretch her R leg and cannot tolerate a quad stretch, she struggles to lift her leg and feels pain in her hamstring and along her medial joint line and patella. She gets occasional \"giving out\" feeling on stairs. She is having trouble sitting cross legged. PMH remarkable for Paget's disease, osteopenia, heart valve replacement    Pain  Current pain ratin  At worst pain ratin  Quality: dull ache  Aggravating factors: squatting, stairs and sleeping    Hand dominance: left    Treatments  Current treatment: physical therapy  Patient Goals  Patient goals for therapy: decreased pain, improved balance, increased motion, increased strength, independence with ADLs/IADLs and return to sport/leisure activities             Objective          Active Range of Motion     Right Knee   Flexion: 134 degrees   Extension: 0 degrees     Patellar Mobility     Right Knee Patellar tendons within functional limits include the medial, lateral, superior and inferior.     Strength/Myotome Testing     Left Hip   Planes of Motion   Flexion: 4+  External rotation: 4+  Internal rotation: 4+    Right Hip   Planes of Motion   Flexion: 4+  External rotation: 4  Internal rotation: 4    Left Knee   Flexion: 4-  Extension: 4    Right " Knee   Flexion: 4+  Extension: 4+    Swelling     Right Knee Girth Measurement (cm)   Joint line: 38 cm    Ambulation     Ambulation: Stairs   Ascend stairs: independent  Pattern: reciprocal  Railings: without rails  Descend stairs: independent  Pattern: reciprocal  Railings: without rails    Quality of Movement During Gait     Knee    Knee (Right): Positive varus thrust.     Functional Assessment     Single Leg Stance   Left: 30 seconds  Right: 30 seconds          Assessment & Plan       Assessment  Impairments: abnormal gait, abnormal muscle firing, abnormal or restricted ROM, impaired balance, impaired physical strength, lacks appropriate home exercise program, pain with function and weight-bearing intolerance   Functional limitations: lifting, walking, moving in bed, sitting, standing, stooping and unable to perform repetitive tasks   Assessment details: Pt exhibits the following limitations: decreased knee ROM, decreased LE strength, limited balance, compensations in gait, difficulty with stair negotiation and pain with ADLs. Pt's signs and symptoms are consistent with referring diagnosis. Pt would benefit from additional skilled therapy in order to address the aforementioned problems and return to prior level of functioning with minimal functional limitations.  Prognosis: good    Goals  Plan Goals: Short Term Goals (achieve in 4 weeks):  1. Pt will report current pain decreased to 0/10.  2. Pt will be I with initial HEP.  3. Pt will ambulate with no varus thrust on R LE.  4. Pt will report ability to sit with her legs crossed for at least 15 minutes with no increase in her R knee pain.  Long Term Goals (achieve in 6-8 weeks):  5. Pt will exhibit R knee flexion/extension and bilateral hip abduction strength increase to 4+/5 to exhibit functional gait and effective transfers.  6. Pt will return to playing pickleball three days a week.  7. Pt will report a WOMAC score of 15 or less to indicate decreased  self-reported functional limitations and disability.  8. Pt will be I with advanced discharge HEP to maintain progress made in therapy.    Plan  Therapy options: will be seen for skilled therapy services  Planned modality interventions: cryotherapy, TENS, dry needling, iontophoresis, ultrasound, electrical stimulation/Russian stimulation and thermotherapy (hydrocollator packs)  Planned therapy interventions: ADL retraining, balance/weight-bearing training, flexibility, functional ROM exercises, gait training, home exercise program, IADL retraining, manual therapy, neuromuscular re-education, soft tissue mobilization, strengthening, stretching, therapeutic activities, joint mobilization, abdominal trunk stabilization and transfer training  Frequency: 2x week  Duration in weeks: 8  Treatment plan discussed with: patient        Visit Diagnoses:    ICD-10-CM ICD-9-CM   1. Acute pain of right knee  M25.561 719.46       Timed:  Manual Therapy:         mins  93063;  Therapeutic Exercise:    10     mins  91782;     Neuromuscular Chencho:        mins  08732;    Therapeutic Activity:     15     mins  16208;     Gait Training:           mins  17008;     Ultrasound:          mins  83709;    Electrical Stimulation:         mins  46951 ( );    Untimed:  Electrical Stimulation:         mins  90567 ( );  Mechanical Traction:         mins  89396;     Timed Treatment:   25   mins   Total Treatment:     45   mins    PT SIGNATURE: Neo Louis PT, DPT, OCS  DATE TREATMENT INITIATED: 5/15/2025      Initial Certification  Certification Period: 8/13/2025  I certify that the therapy services are furnished while this patient is under my care.  The services outlined above are required by this patient, and will be reviewed every 90 days.     PHYSICIAN: Alma Gar MD      DATE:     Please sign and return via fax to [phone].. Thank you, [facility].

## 2025-05-19 RX ORDER — CHOLECALCIFEROL (VITAMIN D3) 1250 MCG
50000 CAPSULE ORAL WEEKLY
Qty: 12 CAPSULE | Refills: 1 | Status: SHIPPED | OUTPATIENT
Start: 2025-05-19

## 2025-05-21 ENCOUNTER — TREATMENT (OUTPATIENT)
Dept: PHYSICAL THERAPY | Facility: CLINIC | Age: 70
End: 2025-05-21
Payer: MEDICARE

## 2025-05-21 DIAGNOSIS — M25.512 ACUTE PAIN OF LEFT SHOULDER: ICD-10-CM

## 2025-05-21 DIAGNOSIS — M25.561 ACUTE PAIN OF RIGHT KNEE: Primary | ICD-10-CM

## 2025-05-21 NOTE — PROGRESS NOTES
Physical Therapy Daily Note    [facility]  [address]  [address]  [place] [address]      Patient: Chastity Berrios   : 1955  Referring practitioner: Alma Gar MD  Date of Initial Visit:   Type: THERAPY  Noted: 5/15/2025  Today's Date: 2025  Patient seen for 2 sessions         Chastity Berrios reports: HEP is going well, she still isn't playing pickleball to avoid re-aggravating the R knee.        Subjective     Objective   See Exercise, Manual, and Modality Logs for complete treatment.       Assessment/Plan    Chastity could tolerate addition of standing balance and agility exercises in frontal plane with no increase in knee pain. She does need cueing to avoid R knee valgus with most exercises.    Patient requires verbal and tactile cueing from therapist for proper form with all interventions provided. Patient would continue to benefit from skilled therapy to address the limitations listed above.    Progress per POC    Visit Diagnoses:    ICD-10-CM ICD-9-CM   1. Acute pain of right knee  M25.561 719.46   2. Acute pain of left shoulder  M25.512 719.41                  Timed:  Manual Therapy:         mins  82262;  Therapeutic Exercise:         mins  59984;     Neuromuscular Chencho:    15    mins  03464;    Therapeutic Activity:     15     mins  65999;     Gait Training:           mins  95512;  Ultrasound:          mins  40517;    Electrical Stimulation:         mins  18001 ( );    Untimed:  Group Therapy: ___  mins  01071;   Electrical Stimulation:         mins  77776 ( );  Mechanical Traction:         mins  04878;   Iontophoresis:             ___  mins 22522    Timed Treatment:   30   mins   Total Treatment:     30   mins    Neo Louis PT, DPT, OCS  Physical Therapist   [place] License #[id number]

## 2025-05-29 ENCOUNTER — TREATMENT (OUTPATIENT)
Dept: PHYSICAL THERAPY | Facility: CLINIC | Age: 70
End: 2025-05-29
Payer: MEDICARE

## 2025-05-29 DIAGNOSIS — M25.561 ACUTE PAIN OF RIGHT KNEE: Primary | ICD-10-CM

## 2025-05-29 DIAGNOSIS — M25.512 ACUTE PAIN OF LEFT SHOULDER: ICD-10-CM

## 2025-05-29 NOTE — PROGRESS NOTES
Physical Therapy Daily Note    [facility]  [address]  [address]  [place] [address]      Patient: Chastity Berrios   : 1955  Referring practitioner: Alma Gar MD  Date of Initial Visit:   Type: THERAPY  Noted: 5/15/2025  Today's Date: 2025  Patient seen for 3 sessions         Chastity Berrios reports: she was able to play pickleball with no increase in knee pain yesterday. The resisted side stepping exercise is very irritating of her pain and weakness.        Subjective     Objective Modality Logs for complete treatment.       Assessment/Plan    Chastity could not tolerate progression to 6 inch stair for her lateral step downs today due to quad weakness and knee pain. She also struggles with step ups on higher steps, indicating R glute max weakness.    Patient requires verbal and tactile cueing from therapist for proper form with all interventions provided. Patient would continue to benefit from skilled therapy to address the limitations listed above.    Progress per POC    Visit Diagnoses:    ICD-10-CM ICD-9-CM   1. Acute pain of right knee  M25.561 719.46   2. Acute pain of left shoulder  M25.512 719.41                  Timed:  Manual Therapy:         mins  57923;  Therapeutic Exercise:    15     mins  39284;     Neuromuscular Chencho:    8    mins  67931;    Therapeutic Activity:     15     mins  08384;     Gait Training:           mins  26510;  Ultrasound:          mins  68944;    Electrical Stimulation:         mins  62726 ( );    Untimed:  Group Therapy: ___  mins  97436;   Electrical Stimulation:         mins  56045 ( );  Mechanical Traction:         mins  18376;   Iontophoresis:             ___  mins 39708    Timed Treatment:   38   mins   Total Treatment:     48   mins    Neo Louis PT, DPT, OCS  Physical Therapist   [place] License #[id number]

## 2025-05-31 LAB
GLUCOSE SERPL-MCNC: 95 MG/DL (ref 70–99)
HBA1C MFR BLD: 5.9 % (ref 4.8–5.6)

## 2025-06-04 ENCOUNTER — TREATMENT (OUTPATIENT)
Dept: PHYSICAL THERAPY | Facility: CLINIC | Age: 70
End: 2025-06-04
Payer: MEDICARE

## 2025-06-04 DIAGNOSIS — M25.512 ACUTE PAIN OF LEFT SHOULDER: ICD-10-CM

## 2025-06-04 DIAGNOSIS — M25.561 ACUTE PAIN OF RIGHT KNEE: Primary | ICD-10-CM

## 2025-06-04 NOTE — PROGRESS NOTES
Physical Therapy Daily Note    [facility]  [address]  [address]  [place] [address]      Patient: Chastity Berrios   : 1955  Referring practitioner: Alma Gar MD  Date of Initial Visit:   Type: THERAPY  Noted: 5/15/2025  Today's Date: 2025  Patient seen for 4 sessions         Chastity Berrios reports: she was sore for at least two days after last visit, exercises may have been a little too much. She was able to play pickleball within the last 48 hours after her soreness subsided.        Subjective     Objective   See Exercise, Manual, and Modality Logs for complete treatment.       Assessment/Plan    Step ups deferred today due to symptom exacerbation, but all other interventions tolerated with no issues. She exhibits hip abduction and external rotation weakness and difficulty with frontal plane control of R knee.    Patient requires verbal and tactile cueing from therapist for proper form with all interventions provided. Patient would continue to benefit from skilled therapy to address the limitations listed above.    Progress per POC    Visit Diagnoses:    ICD-10-CM ICD-9-CM   1. Acute pain of right knee  M25.561 719.46   2. Acute pain of left shoulder  M25.512 719.41                  Timed:  Manual Therapy:         mins  68606;  Therapeutic Exercise:         mins  32597;     Neuromuscular Chencho:        mins  08063;    Therapeutic Activity:     15     mins  10958;     Gait Training:      15     mins  28111;  Ultrasound:          mins  16233;    Electrical Stimulation:         mins  39990 ( );    Untimed:  Group Therapy: __  mins  13262;   Electrical Stimulation:         mins  30068 ( );  Mechanical Traction:         mins  79920;   Iontophoresis:             ___  mins 23534    Timed Treatment:   30   mins   Total Treatment:     30   mins    Neo Louis PT, DPT, OCS  Physical Therapist   [place] License #[id number]

## 2025-06-05 ENCOUNTER — OFFICE VISIT (OUTPATIENT)
Dept: INTERNAL MEDICINE | Facility: CLINIC | Age: 70
End: 2025-06-05
Payer: MEDICARE

## 2025-06-05 VITALS
DIASTOLIC BLOOD PRESSURE: 78 MMHG | RESPIRATION RATE: 18 BRPM | BODY MASS INDEX: 29.16 KG/M2 | SYSTOLIC BLOOD PRESSURE: 130 MMHG | TEMPERATURE: 98.1 F | OXYGEN SATURATION: 100 % | HEIGHT: 65 IN | WEIGHT: 175 LBS | HEART RATE: 80 BPM

## 2025-06-05 DIAGNOSIS — E55.9 VITAMIN D DEFICIENCY: ICD-10-CM

## 2025-06-05 DIAGNOSIS — I47.19 PAT (PAROXYSMAL ATRIAL TACHYCARDIA): Primary | Chronic | ICD-10-CM

## 2025-06-05 DIAGNOSIS — R73.02 IGT (IMPAIRED GLUCOSE TOLERANCE): Chronic | ICD-10-CM

## 2025-06-05 DIAGNOSIS — Z79.899 MEDICATION MANAGEMENT: ICD-10-CM

## 2025-06-05 NOTE — PROGRESS NOTES
Subjective   Chastity Berrios is a 69 y.o. female.     Chief Complaint   Patient presents with    Hyperglycemia    paroxysmal atrial tachycardia    Vitamin D Deficiency         History of Present Illness  In for recheck of aortic stenosis.  An aortic valve replacement 2/20/2018.  She is completely asymptomatic.  No angina.  No congestive heart failure.  No syncope or presyncope.  Got a porcine aVR.  No history of recent palpitations or PSVT.    Hyperglycemia  Symptoms are chronic.   Onset was more than 1 year ago.   Symptoms occur constantly.   Symptoms have been unchanged since onset.   Pertinent negative symptoms include no chest pain.        The following portions of the patient's history were reviewed and updated as appropriate: allergies, current medications, past social history and problem list.    Outpatient Medications Marked as Taking for the 6/5/25 encounter (Office Visit) with Alvaro Small MD   Medication Sig Dispense Refill    acetaminophen (TYLENOL) 325 MG tablet Take 2 tablets by mouth Every 6 (Six) Hours As Needed for Mild Pain . 30 tablet 0    aspirin 81 MG EC tablet Take 1 tablet by mouth Daily. 60 tablet 1    Azelastine-Fluticasone 137-50 MCG/ACT suspension       Cholecalciferol (Vitamin D3) 1.25 MG (16915 UT) capsule TAKE 1 CAPSULE BY MOUTH ONCE WEEKLY 12 capsule 1    fluticasone (FLONASE) 50 MCG/ACT nasal spray Administer 2 sprays into the nostril(s) as directed by provider Daily As Needed for Allergies.      loratadine (CLARITIN) 10 MG tablet Take 1 tablet by mouth Daily.      metoprolol succinate XL (TOPROL-XL) 25 MG 24 hr tablet TAKE 1 TABLET BY MOUTH DAILY 90 tablet 1    Probiotic Product (PROBIOTIC DAILY PO) Take  by mouth.      Psyllium (Daily Fiber) 400 MG capsule Take 400 mg by mouth Daily As Needed.      Risankizumab-rzaa,150 MG Dose, (Skyrizi, 150 MG Dose,) 75 MG/0.83ML Prefilled Syringe Kit kit       sennosides-docusate (senna-docusate sodium) 8.6-50 MG per tablet Take 1 tablet by  mouth 2 (Two) Times a Day. (Patient taking differently: Take 3 tablets by mouth Daily. Takes 2 tablets daily) 60 tablet 0       Review of Systems   Respiratory:  Negative for shortness of breath and wheezing.    Cardiovascular:  Negative for chest pain, palpitations and leg swelling.   Neurological:  Negative for light-headedness.       Objective   Vitals:    06/05/25 0955   BP: 130/78   Pulse: 80   Resp: 18   Temp: 98.1 °F (36.7 °C)   SpO2: 100%          06/05/25 0955   Weight: 79.4 kg (175 lb)    [unfilled]  Body mass index is 29.12 kg/m².    Physical Exam  Constitutional:       Appearance: Normal appearance. She is well-developed.   Neck:      Thyroid: No thyromegaly.   Cardiovascular:      Rate and Rhythm: Normal rate and regular rhythm.      Heart sounds: Murmur heard.      Crescendo decrescendo systolic murmur is present with a grade of 2/6.      Diastolic murmur is present with a grade of 2/4.      No gallop.      Comments: Short grade 2/6 FLOR at the aortic area, grade 2 diastolic blow at the aortic area  Pulmonary:      Effort: Pulmonary effort is normal. No respiratory distress.      Breath sounds: Normal breath sounds. No wheezing or rales.   Abdominal:      General: Bowel sounds are normal.      Palpations: Abdomen is soft. There is no mass.      Tenderness: There is no abdominal tenderness. There is no guarding.   Neurological:      Mental Status: She is alert.          Problems Addressed this Visit          Cardiac and Vasculature    PAT (paroxysmal atrial tachycardia) - Primary (Chronic)       Endocrine and Metabolic    IGT (impaired glucose tolerance) (Chronic)    Vitamin D deficiency     Diagnoses         Codes Comments      PAT (paroxysmal atrial tachycardia)    -  Primary ICD-10-CM: I47.19  ICD-9-CM: 427.0       IGT (impaired glucose tolerance)     ICD-10-CM: R73.02  ICD-9-CM: 790.22       Vitamin D deficiency     ICD-10-CM: E55.9  ICD-9-CM: 268.9           Assessment & Plan   In for recheck  of AVR, IGT and history of SVT today June 2025.  History of Paget's disease.  Overall health is doing very well.  She's done great since he AVR.  No cardiac symptoms.  She got annual lab work December 2024 with CBC, CMP, lipids, A1c. She had actually no evidence of CAD.  Gets glucose and A1c today every 6 months.  Her lab work is reviewed today.  She has a regular blood donor and hence sometimes iron deficient.  Does not tolerate oral iron however due to constipation.  We'll follow-up in 6 months.  Annual wellness visit December 2025.  She does have significant AI on exam.  This is also demonstrated on her last TTE 2022.  Will need to follow that over time and Dr. Bonner knows about it.  She does mention a history of psoriasis but this does not appear to be psoriatic arthritis.  Currently she has been switched to Skyrizi from Otezla.  Allergies continue to be problematic despite regular use of Flonase.  Try Azelastine with Flonase nasal spray and see if she does better.  We discussed COVID-vaccine every 6 months since she is at high risk being on Skyrizi.    The above information was reviewed again today 06/05/25.  It continues to be accurate as reflected above and is unchanged.  History, physical and review of systems all reviewed and are unchanged.  Medications were reviewed today and continue the current dosing.           Vonnie disclaimer:   Much of this encounter note is an electronic transcription/translation of spoken language to printed text. The electronic translation of spoken language may permit erroneous, or at times, nonsensical words or phrases to be inadvertently transcribed; Although I have reviewed the note for such errors, some may still exist.

## 2025-06-06 ENCOUNTER — TREATMENT (OUTPATIENT)
Dept: PHYSICAL THERAPY | Facility: CLINIC | Age: 70
End: 2025-06-06
Payer: MEDICARE

## 2025-06-06 DIAGNOSIS — M25.512 ACUTE PAIN OF LEFT SHOULDER: ICD-10-CM

## 2025-06-06 DIAGNOSIS — M25.561 ACUTE PAIN OF RIGHT KNEE: Primary | ICD-10-CM

## 2025-06-06 NOTE — PROGRESS NOTES
Physical Therapy Daily Note    [facility]  [address]  [address]  [place] [address]      Patient: Chastity Berrios   : 1955  Referring practitioner: Alma Gar MD  Date of Initial Visit:   Type: THERAPY  Noted: 5/15/2025  Today's Date: 2025  Patient seen for 5 sessions         Chastity Berrios reports: no new complaints.        Subjective     Objective   See Exercise, Manual, and Modality Logs for complete treatment.       Assessment/Plan    Chastity exhibits difficulty with toe and foot activation during higher level balance activities. She could not tolerate progression of exercises today due to fatigue and aerobic capacity limitations.    Patient requires verbal and tactile cueing from therapist for proper form with all interventions provided. Patient would continue to benefit from skilled therapy to address the limitations listed above.    Progress per POC    Visit Diagnoses:    ICD-10-CM ICD-9-CM   1. Acute pain of right knee  M25.561 719.46   2. Acute pain of left shoulder  M25.512 719.41                  Timed:  Manual Therapy:         mins  29592;  Therapeutic Exercise:         mins  98302;     Neuromuscular Chencho:    15    mins  68999;    Therapeutic Activity:          mins  53128;     Gait Training:      15     mins  79918;  Ultrasound:          mins  03642;    Electrical Stimulation:         mins  52672 ( );    Untimed:  Group Therapy: ___  mins  59222;   Electrical Stimulation:         mins  29873 ( );  Mechanical Traction:         mins  41905;   Iontophoresis:             ___  mins 79774    Timed Treatment:   30   mins   Total Treatment:     40   mins    Neo Louis PT, DPT, OCS  Physical Therapist   [place] License #[id number]

## 2025-06-11 ENCOUNTER — TREATMENT (OUTPATIENT)
Dept: PHYSICAL THERAPY | Facility: CLINIC | Age: 70
End: 2025-06-11
Payer: MEDICARE

## 2025-06-11 DIAGNOSIS — M25.512 ACUTE PAIN OF LEFT SHOULDER: ICD-10-CM

## 2025-06-11 DIAGNOSIS — M25.561 ACUTE PAIN OF RIGHT KNEE: Primary | ICD-10-CM

## 2025-06-11 NOTE — PROGRESS NOTES
Physical Therapy Daily Note    [facility]  [address]  [address]  [place] [address]      Patient: Chastity Berrios   : 1955  Referring practitioner: Alma Gar MD  Date of Initial Visit:   Type: THERAPY  Noted: 5/15/2025  Today's Date: 2025  Patient seen for 6 sessions         Chastity Berrios reports: she hasn't had any increased knee pain in over a week, she's been able to play pickleball like she did before she injured the knee.        Subjective     Objective   See Exercise, Manual, and Modality Logs for complete treatment.       Assessment/Plan    Chastity struggles with ankle stability on both level ground and unstable surfaces but her frontal plane knee control has improved significantly. No observable knee valgus with squatting or steps. Plan to re-assess next visit, DC if no symptom exacerbation.    Patient requires verbal and tactile cueing from therapist for proper form with all interventions provided. Patient would continue to benefit from skilled therapy to address the limitations listed above.    Progress per POC    Visit Diagnoses:    ICD-10-CM ICD-9-CM   1. Acute pain of right knee  M25.561 719.46   2. Acute pain of left shoulder  M25.512 719.41                  Timed:  Manual Therapy:         mins  33446;  Therapeutic Exercise:    15     mins  15422;     Neuromuscular Chencho:    15    mins  07310;    Therapeutic Activity:     15     mins  67200;     Gait Training:           mins  29300;  Ultrasound:          mins  91117;    Electrical Stimulation:         mins  87330 ( );    Untimed:  Group Therapy: ___  mins  56962;   Electrical Stimulation:         mins  46009 ( );  Mechanical Traction:         mins  26261;   Iontophoresis:             ___  mins 66772    Timed Treatment:   45   mins   Total Treatment:     45   mins    Neo Louis PT, DPT, OCS  Physical Therapist   [place] License #[id number]

## 2025-06-13 ENCOUNTER — TREATMENT (OUTPATIENT)
Dept: PHYSICAL THERAPY | Facility: CLINIC | Age: 70
End: 2025-06-13
Payer: MEDICARE

## 2025-06-13 DIAGNOSIS — M25.561 ACUTE PAIN OF RIGHT KNEE: Primary | ICD-10-CM

## 2025-06-13 DIAGNOSIS — M25.512 ACUTE PAIN OF LEFT SHOULDER: ICD-10-CM

## 2025-06-13 NOTE — PROGRESS NOTES
Physical Therapy Re-Evaluation      Patient: Chastity Berrios   : 1955  Diagnosis/ICD-10 Code:  Acute pain of right knee [M25.561]  Referring practitioner: Alma Gar MD  Date of Initial Visit: Type: THERAPY  Noted: 5/15/2025  Today's Date: 2025  Patient seen for 7 sessions      Subjective:   Chastity Berrios reports: she's back to baseline function pain free. She can sit with legs crossed for long periods, she's back to playing pickleball three days a week with no knee pain.  Subjective Questionnaire: WOMAC: 3/96  Clinical Progress: improved  Home Program Compliance: Yes  Treatment has included: therapeutic exercise, neuromuscular re-education, manual therapy, therapeutic activity, and gait training    Subjective Evaluation    Pain  Current pain ratin  At worst pain ratin       Objective          Strength/Myotome Testing     Left Hip   Planes of Motion   Flexion: 4+  Abduction: 4+    Right Hip   Planes of Motion   Flexion: 4+  Abduction: 4+    Left Knee   Flexion: 4+  Extension: 4+    Right Knee   Flexion: 4+  Extension: 4+    Ambulation     Ambulation: Stairs   Ascend stairs: independent  Pattern: reciprocal  Railings: without rails  Descend stairs: independent  Pattern: reciprocal  Railings: without rails    Observational Gait   Gait: within functional limits     Functional Assessment     Single Leg Stance   Left: 20 seconds  Right: 20 seconds      Assessment & Plan       Plan  Therapy options: will not be seen for skilled therapy services  Treatment plan discussed with: patient        Visit Diagnoses:    ICD-10-CM ICD-9-CM   1. Acute pain of right knee  M25.561 719.46   2. Acute pain of left shoulder  M25.512 719.41       Progress toward previous goals: All Met    Short Term Goals (achieve in 4 weeks):  1. Pt will report current pain decreased to 0/10. (Met)    2. Pt will be I with initial HEP. (Met)    3. Pt will ambulate with no varus thrust on R LE. (Met)    4. Pt will report ability to  sit with her legs crossed for at least 15 minutes with no increase in her R knee pain. (Met)    Long Term Goals (achieve in 6-8 weeks):  5. Pt will exhibit R knee flexion/extension and bilateral hip abduction strength increase to 4+/5 to exhibit functional gait and effective transfers. (Met)    6. Pt will return to playing pickleball three days a week. (Met)    7. Pt will report a WOMAC score of 15 or less to indicate decreased self-reported functional limitations and disability. (Met)    8. Pt will be I with advanced discharge HEP to maintain progress made in therapy. (Met)        Recommendations: Discharge    Prognosis to achieve goals: good    PT Signature: Neo Louis PT, DPT, OCS      Based upon review of the patient's progress and continued therapy plan, it is my medical opinion that Chastity Berrios should continue physical therapy treatment at [facility] PH[facility]  [address] [place] [address] [id number] [phone].    Signature: __________________________________  Alma Gar MD    Timed:  Manual Therapy:         mins  87703;  Therapeutic Exercise:    15     mins  02978;     Neuromuscular [place]:        mins  11485;    Therapeutic Activity:     15     mins  85666;     Gait Training:           mins  53774;     Ultrasound:          mins  60110;    Electrical Stimulation:         mins  43755 ( );    Untimed:  Electrical Stimulation:         mins  66513 ( );  Mechanical Traction:         mins  93367;     Timed Treatment:   30   mins   Total Treatment:     40   mins

## 2025-08-18 RX ORDER — METOPROLOL SUCCINATE 25 MG/1
25 TABLET, EXTENDED RELEASE ORAL DAILY
Qty: 90 TABLET | Refills: 1 | Status: SHIPPED | OUTPATIENT
Start: 2025-08-18

## (undated) DEVICE — GW EMR FIX EXCHG J STD .035 3MM 260CM

## (undated) DEVICE — Device: Brand: LEVEL 1

## (undated) DEVICE — GOWN,NON-REINFORCED,SIRUS,SET IN SLV,XXL: Brand: MEDLINE

## (undated) DEVICE — CLAMP INSERT: Brand: STEALTH® CLAMP INSERT

## (undated) DEVICE — TUBING, SUCTION, 1/4" X 10', STRAIGHT: Brand: MEDLINE

## (undated) DEVICE — SUT PROLN 2/0 V5 48IN D9694

## (undated) DEVICE — DRSNG WND GEL FIBR OPTICELL AG PLS W/SLV LF 4X5IN  STRL

## (undated) DEVICE — SOL IRR NACL 0.9PCT BT 1000ML

## (undated) DEVICE — ELECTRD NDL EZ CLN MOD 2.75IN

## (undated) DEVICE — SUT PROLN 3/0 SH D/A 36IN 8522H

## (undated) DEVICE — PENCL E/S ULTRAVAC TELESCP NOSE HOLSTR 10FT

## (undated) DEVICE — ST. SORBAVIEW ULTIMATE IJ SYSTEM A,C: Brand: CENTURION

## (undated) DEVICE — PREP SOL POVIDONE/IODINE BT 4OZ

## (undated) DEVICE — EACH LANGSTON DUAL LUMEN CATHETER IS INDICATED FOR DELIVERY OF CONTRAST MEDIUM IN ANGIOGRAPHIC STUDIES AND FOR SIMULTANEOUS PRESSURE MEASUREMENT FROM TWO SITES. THIS TYPE OF PRESSURE MEASUREMENT IS USEFUL IN DETERMINING TRANSVALVULAR, INTRAVASCULAR AND INTRAVENTRICULAR PRESSURE GRADIENTS.: Brand: LANGSTON® DUAL LUMEN CATHETER

## (undated) DEVICE — Device

## (undated) DEVICE — APPL CHLORAPREP W/TINT 10.5ML PERC STRL

## (undated) DEVICE — THE SINGLE USE ETRAP – POLYP TRAP IS USED FOR SUCTION RETRIEVAL OF ENDOSCOPICALLY REMOVED POLYPS.: Brand: ETRAP

## (undated) DEVICE — SOL IRR H2O BTL 1000ML STRL

## (undated) DEVICE — PATIENT RETURN ELECTRODE, SINGLE-USE, CONTACT QUALITY MONITORING, ADULT, WITH 9FT CORD, FOR PATIENTS WEIGING OVER 33LBS. (15KG): Brand: MEGADYNE

## (undated) DEVICE — PK PERFUS CUST W/CARDIOPLEGIA

## (undated) DEVICE — LOU OPEN HEART DR POLLOCK: Brand: MEDLINE INDUSTRIES, INC.

## (undated) DEVICE — SNAR POLYP CAPTIVATOR RND STFF 2.4 240CM 10MM 1P/U

## (undated) DEVICE — DRSNG GZ PETROLTM XEROFORM CURAD 1X8IN STRL

## (undated) DEVICE — CATH DIAG IMPULSE FR5 5F 100CM

## (undated) DEVICE — CANN O2 ETCO2 FITS ALL CONN CO2 SMPL A/ 7IN DISP LF

## (undated) DEVICE — PK HEART OPN 40

## (undated) DEVICE — SUT SILK 0 CT1 CR8 18IN C021D

## (undated) DEVICE — PK ORTHO MINOR TOWER 40

## (undated) DEVICE — SENSR CERBRL O2 PK/2

## (undated) DEVICE — GLV SURG BIOGEL M LTX PF 7 1/2

## (undated) DEVICE — VLV REL VAC VRV100 STRL

## (undated) DEVICE — SKIN PREP TRAY W/CHG: Brand: MEDLINE INDUSTRIES, INC.

## (undated) DEVICE — CANN ART EOPA 3D NV W/CONN 20F

## (undated) DEVICE — ST TOURNI COMPL A/ 7IN

## (undated) DEVICE — SINGLE-USE BIOPSY FORCEPS: Brand: RADIAL JAW 4

## (undated) DEVICE — BALN PRESS WEDGE 5F 110CM

## (undated) DEVICE — ADAPT CLN BIOGUARD AIR/H2O DISP

## (undated) DEVICE — SPNG GZ WOVN 4X4IN 12PLY 10/BX STRL

## (undated) DEVICE — CATH DIAG IMPULSE FL3.5 5F 100CM

## (undated) DEVICE — AVID DUAL STAGE VENOUS DRAINAGE CANNULA: Brand: AVID DUAL STAGE VENOUS DRAINAGE CANNULA

## (undated) DEVICE — SOL ISO/ALC RUB 70PCT 4OZ

## (undated) DEVICE — SUT PROLN 4/0 BB D/A 36IN 8581H

## (undated) DEVICE — PK CATH CARD 40

## (undated) DEVICE — PK ATS CUST W CARDIOTOMY RESEVOIR

## (undated) DEVICE — KT MANIFLD CARDIAC

## (undated) DEVICE — APPL CHLORAPREP HI/LITE 26ML ORNG

## (undated) DEVICE — GLV SURG BIOGEL LTX PF 8

## (undated) DEVICE — SYS PERFUS SEP PLATLT W TIPS CUST

## (undated) DEVICE — DISPOSABLE TOURNIQUET CUFF SINGLE BLADDER, SINGLE PORT AND QUICK CONNECT CONNECTOR: Brand: COLOR CUFF

## (undated) DEVICE — CONN STR 1/2INX3/8IN

## (undated) DEVICE — 12 FOOT DISPOSABLE EXTENSION CABLE WITH SAFE CONNECT / SCREW-DOWN

## (undated) DEVICE — OASIS DRAIN, SINGLE, INLINE & ATS COMPATIBLE: Brand: OASIS

## (undated) DEVICE — SENSR O2 OXIMAX FNGR A/ 18IN NONSTR

## (undated) DEVICE — DRSNG SURESITE WNDW 2.38X2.75

## (undated) DEVICE — HEMOCONCENTRATOR PERFUS LPS06

## (undated) DEVICE — GLIDESHEATH BASIC HYDROPHILIC COATED INTRODUCER SHEATH: Brand: GLIDESHEATH

## (undated) DEVICE — KT ORCA ORCAPOD DISP STRL

## (undated) DEVICE — SCANLAN® SUTURE BOOT™ INSTRUMENT JAW COVERS - ORIGINAL YELLOW, STANDARD PKG (5 PAIR/CARTRIDGE, 1 CARTRIDGE/PKG): Brand: SCANLAN® SUTURE BOOT™ INSTRUMENT JAW COVERS

## (undated) DEVICE — UNDERCAST PADDING: Brand: DEROYAL

## (undated) DEVICE — EXTENSION SET, MALE LUER LOCK ADAPTER WITH RETRACTABLE COLLAR

## (undated) DEVICE — SUPPLIED AS A PAIR FOR USE IN JAWS OF RESUABLE CLAMPS.: Brand: INTRACK® INSERTS

## (undated) DEVICE — BLAKE CARDIO CONNECTOR 1:1: Brand: J-VAC

## (undated) DEVICE — LN SMPL CO2 SHTRM SD STREAM W/M LUER

## (undated) DEVICE — GLV SURG SIGNATURE ESSENTIAL PF LTX SZ8